# Patient Record
Sex: FEMALE | Race: BLACK OR AFRICAN AMERICAN | Employment: FULL TIME | URBAN - METROPOLITAN AREA
[De-identification: names, ages, dates, MRNs, and addresses within clinical notes are randomized per-mention and may not be internally consistent; named-entity substitution may affect disease eponyms.]

---

## 2017-01-30 ENCOUNTER — OFFICE VISIT (OUTPATIENT)
Dept: INTERNAL MEDICINE CLINIC | Facility: CLINIC | Age: 57
End: 2017-01-30

## 2017-01-30 VITALS
WEIGHT: 183 LBS | HEIGHT: 64 IN | HEART RATE: 88 BPM | DIASTOLIC BLOOD PRESSURE: 70 MMHG | BODY MASS INDEX: 31.24 KG/M2 | TEMPERATURE: 97 F | SYSTOLIC BLOOD PRESSURE: 92 MMHG

## 2017-01-30 DIAGNOSIS — E78.00 PURE HYPERCHOLESTEROLEMIA: ICD-10-CM

## 2017-01-30 DIAGNOSIS — Z12.11 COLON CANCER SCREENING: ICD-10-CM

## 2017-01-30 DIAGNOSIS — Z12.39 BREAST CANCER SCREENING: ICD-10-CM

## 2017-01-30 DIAGNOSIS — I10 ESSENTIAL HYPERTENSION, BENIGN: Primary | ICD-10-CM

## 2017-01-30 DIAGNOSIS — E11.9 DIABETES MELLITUS WITHOUT COMPLICATION (HCC): ICD-10-CM

## 2017-01-30 PROCEDURE — 99214 OFFICE O/P EST MOD 30 MIN: CPT | Performed by: INTERNAL MEDICINE

## 2017-01-30 PROCEDURE — 99212 OFFICE O/P EST SF 10 MIN: CPT | Performed by: INTERNAL MEDICINE

## 2017-01-30 RX ORDER — ERGOCALCIFEROL 1.25 MG/1
50000 CAPSULE ORAL WEEKLY
Qty: 12 CAPSULE | Refills: 3 | Status: SHIPPED | OUTPATIENT
Start: 2017-01-30 | End: 2018-01-31

## 2017-01-30 RX ORDER — LISINOPRIL 40 MG/1
40 TABLET ORAL
Qty: 90 TABLET | Refills: 3 | Status: SHIPPED | OUTPATIENT
Start: 2017-01-30 | End: 2018-01-31

## 2017-01-30 RX ORDER — HYDROCODONE BITARTRATE AND ACETAMINOPHEN 10; 325 MG/1; MG/1
1 TABLET ORAL EVERY 6 HOURS PRN
Qty: 120 TABLET | Refills: 0 | Status: SHIPPED | OUTPATIENT
Start: 2017-01-30 | End: 2017-02-27

## 2017-01-30 RX ORDER — FOLIC ACID 1 MG/1
1 TABLET ORAL DAILY
Qty: 90 TABLET | Refills: 3 | Status: SHIPPED | OUTPATIENT
Start: 2017-01-30 | End: 2018-01-31

## 2017-01-30 RX ORDER — HYDROCHLOROTHIAZIDE 12.5 MG/1
12.5 CAPSULE, GELATIN COATED ORAL
Qty: 90 CAPSULE | Refills: 3 | Status: SHIPPED | OUTPATIENT
Start: 2017-01-30 | End: 2018-01-31

## 2017-01-30 RX ORDER — AMLODIPINE BESYLATE 10 MG/1
10 TABLET ORAL
Qty: 90 TABLET | Refills: 3 | Status: SHIPPED | OUTPATIENT
Start: 2017-01-30 | End: 2018-01-31

## 2017-01-30 RX ORDER — ATORVASTATIN CALCIUM 20 MG/1
20 TABLET, FILM COATED ORAL
Qty: 90 TABLET | Refills: 3 | Status: SHIPPED | OUTPATIENT
Start: 2017-01-30 | End: 2018-01-31

## 2017-01-30 RX ORDER — HYDROXYCHLOROQUINE SULFATE 200 MG/1
200 TABLET, FILM COATED ORAL 2 TIMES DAILY
Qty: 180 TABLET | Refills: 3 | Status: SHIPPED | OUTPATIENT
Start: 2017-01-30 | End: 2018-01-31

## 2017-01-30 NOTE — PROGRESS NOTES
HPI:    Patient ID: Mani Morris is a 55year old female. Hypertension  This is a chronic problem. The current episode started more than 1 year ago. The problem is unchanged. The problem is controlled.  Pertinent negatives include no anxiety, blurred vi dizziness, syncope, facial asymmetry, weakness, light-headedness, numbness and headaches. Current Outpatient Prescriptions:  folic acid 1 MG Oral Tab Take 1 tablet (1 mg total) by mouth daily.  Disp: 90 tablet Rfl: 3   hydrochlorothiazide 12.5 MG (ALPHAGAN) 0.2 % Ophthalmic Solution Apply  to eye. instill 1 drop by ophthalmic route  every 8 hours into affected eye(s) Disp:  Rfl:    omalizumab in Sterile Water for Injection Inject 300 mg into the skin every 28 days.  Disp:  Rfl:    EPIPEN 2-ROSANNE 0.3 M equal, round, and reactive to light. Neck: Normal range of motion. No thyromegaly present. Cardiovascular: Normal rate, regular rhythm, normal heart sounds and intact distal pulses. Exam reveals no gallop. No murmur heard.   Pulmonary/Chest: Effort tablet 3      Sig: Take 1 tablet (10 mg total) by mouth once daily. Hydroxychloroquine Sulfate 200 MG Oral Tab 180 tablet 3      Sig: Take 1 tablet (200 mg total) by mouth 2 (two) times daily.       ergocalciferol 43152 units Oral Cap 12 capsule 3

## 2017-02-24 RX ORDER — HYDROCODONE BITARTRATE AND ACETAMINOPHEN 10; 325 MG/1; MG/1
1 TABLET ORAL EVERY 6 HOURS PRN
Qty: 120 TABLET | Refills: 0 | Status: CANCELLED | OUTPATIENT
Start: 2017-02-24

## 2017-02-27 RX ORDER — HYDROCODONE BITARTRATE AND ACETAMINOPHEN 10; 325 MG/1; MG/1
1 TABLET ORAL EVERY 6 HOURS PRN
Qty: 120 TABLET | Refills: 0 | Status: SHIPPED | OUTPATIENT
Start: 2017-02-27 | End: 2017-03-27

## 2017-03-02 ENCOUNTER — TELEPHONE (OUTPATIENT)
Dept: INTERNAL MEDICINE CLINIC | Facility: CLINIC | Age: 57
End: 2017-03-02

## 2017-03-03 ENCOUNTER — OFFICE VISIT (OUTPATIENT)
Dept: INTERNAL MEDICINE CLINIC | Facility: CLINIC | Age: 57
End: 2017-03-03

## 2017-03-03 VITALS
DIASTOLIC BLOOD PRESSURE: 70 MMHG | HEART RATE: 84 BPM | SYSTOLIC BLOOD PRESSURE: 110 MMHG | HEIGHT: 64 IN | WEIGHT: 182 LBS | BODY MASS INDEX: 31.07 KG/M2 | TEMPERATURE: 99 F

## 2017-03-03 DIAGNOSIS — S46.911A MUSCLE STRAIN OF SCAPULAR REGION, RIGHT, INITIAL ENCOUNTER: ICD-10-CM

## 2017-03-03 DIAGNOSIS — S39.012A LOW BACK STRAIN, INITIAL ENCOUNTER: Primary | ICD-10-CM

## 2017-03-03 PROBLEM — S46.919A MUSCLE STRAIN OF SCAPULAR REGION: Status: ACTIVE | Noted: 2017-03-03

## 2017-03-03 PROCEDURE — 99214 OFFICE O/P EST MOD 30 MIN: CPT | Performed by: INTERNAL MEDICINE

## 2017-03-03 PROCEDURE — 99212 OFFICE O/P EST SF 10 MIN: CPT | Performed by: INTERNAL MEDICINE

## 2017-03-03 RX ORDER — CARISOPRODOL 350 MG/1
350 TABLET ORAL 2 TIMES DAILY PRN
Qty: 60 TABLET | Refills: 1 | Status: SHIPPED
Start: 2017-03-03 | End: 2017-12-11 | Stop reason: ALTCHOICE

## 2017-03-03 RX ORDER — PREDNISONE 1 MG/1
5 TABLET ORAL 3 TIMES DAILY
COMMUNITY
End: 2018-09-10

## 2017-03-03 NOTE — ASSESSMENT & PLAN NOTE
Work restrictions , see forms  [facility]  Physical therapy  Continue prednisone and hydrocodone  Return on March 17, 2017.

## 2017-03-03 NOTE — PROGRESS NOTES
HPI:    Patient ID: Jeff Dent is a 55year old female. Back Pain  This is a new problem. The current episode started 1 to 4 weeks ago. The problem occurs constantly. The problem is unchanged. The pain is present in the lumbar spine.  The quality of t Respiratory: Negative for apnea, cough, chest tightness, shortness of breath and wheezing. Cardiovascular: Negative for chest pain, palpitations and leg swelling.    Gastrointestinal: Negative for nausea, abdominal pain, diarrhea, constipation, blood in Clobetasol Propionate (TEMOVATE) 0.05 % External Cream Apply 1 Units topically 2 (two) times daily.  Disp: 60 g Rfl: 3   APEXICON E 0.05 % Apply Externally Cream  Disp:  Rfl: 0   Doxepin HCl (SINEQUAN) 10 MG Oral Cap  Disp:  Rfl: 1   nystatin-triamcinolone Fish                        Comment:Other reaction(s):  FISH CONTAINING PRODUCTS  Lansoprazole                Comment:Other reaction(s): LANSOPRAZOLE             Other reaction(s): LANSOPRAZOLE  Latex                       Comment:Other reaction(s): LATEX No orders of the defined types were placed in this encounter.        Meds This Visit:  No prescriptions requested or ordered in this encounter       Imaging & Referrals:  None       #[phone]

## 2017-03-17 ENCOUNTER — HOSPITAL ENCOUNTER (EMERGENCY)
Facility: HOSPITAL | Age: 57
Discharge: HOME | End: 2017-03-17
Attending: EMERGENCY MEDICINE
Payer: COMMERCIAL

## 2017-03-17 ENCOUNTER — OFFICE VISIT (OUTPATIENT)
Dept: INTERNAL MEDICINE CLINIC | Facility: CLINIC | Age: 57
End: 2017-03-17

## 2017-03-17 ENCOUNTER — APPOINTMENT (OUTPATIENT)
Dept: GENERAL RADIOLOGY | Facility: HOSPITAL | Age: 57
End: 2017-03-17
Attending: EMERGENCY MEDICINE
Payer: COMMERCIAL

## 2017-03-17 VITALS
WEIGHT: 191 LBS | HEART RATE: 109 BPM | TEMPERATURE: 99 F | SYSTOLIC BLOOD PRESSURE: 104 MMHG | BODY MASS INDEX: 32.61 KG/M2 | HEIGHT: 64 IN | DIASTOLIC BLOOD PRESSURE: 64 MMHG

## 2017-03-17 VITALS
TEMPERATURE: 98 F | DIASTOLIC BLOOD PRESSURE: 79 MMHG | WEIGHT: 194 LBS | SYSTOLIC BLOOD PRESSURE: 120 MMHG | HEART RATE: 75 BPM | BODY MASS INDEX: 33.12 KG/M2 | OXYGEN SATURATION: 99 % | HEIGHT: 64 IN | RESPIRATION RATE: 18 BRPM

## 2017-03-17 DIAGNOSIS — R07.89 ATYPICAL CHEST PAIN: Primary | ICD-10-CM

## 2017-03-17 LAB
ANION GAP SERPL CALC-SCNC: 11 MMOL/L (ref 0–18)
BASOPHILS # BLD: 0 K/UL (ref 0–0.2)
BASOPHILS NFR BLD: 0 %
BUN SERPL-MCNC: 17 MG/DL (ref 8–20)
BUN/CREAT SERPL: 21.8 (ref 10–20)
CALCIUM SERPL-MCNC: 9.6 MG/DL (ref 8.5–10.5)
CHLORIDE SERPL-SCNC: 103 MMOL/L (ref 95–110)
CO2 SERPL-SCNC: 25 MMOL/L (ref 22–32)
CREAT SERPL-MCNC: 0.78 MG/DL (ref 0.5–1.5)
D DIMER PPP FEU-MCNC: 0.27 MCG/ML (ref ?–0.55)
EOSINOPHIL # BLD: 0 K/UL (ref 0–0.7)
EOSINOPHIL NFR BLD: 0 %
ERYTHROCYTE [DISTWIDTH] IN BLOOD BY AUTOMATED COUNT: 13.8 % (ref 11–15)
GLUCOSE SERPL-MCNC: 118 MG/DL (ref 70–99)
HCT VFR BLD AUTO: 38.1 % (ref 35–48)
HGB BLD-MCNC: 12.6 G/DL (ref 12–16)
LYMPHOCYTES # BLD: 3.1 K/UL (ref 1–4)
LYMPHOCYTES NFR BLD: 33 %
MCH RBC QN AUTO: 30.5 PG (ref 27–32)
MCHC RBC AUTO-ENTMCNC: 33.2 G/DL (ref 32–37)
MCV RBC AUTO: 91.9 FL (ref 80–100)
MONOCYTES # BLD: 0.7 K/UL (ref 0–1)
MONOCYTES NFR BLD: 7 %
NEUTROPHILS # BLD AUTO: 5.5 K/UL (ref 1.8–7.7)
NEUTROPHILS NFR BLD: 59 %
OSMOLALITY UR CALC.SUM OF ELEC: 291 MOSM/KG (ref 275–295)
PLATELET # BLD AUTO: 249 K/UL (ref 140–400)
PMV BLD AUTO: 8.1 FL (ref 7.4–10.3)
POTASSIUM SERPL-SCNC: 3.2 MMOL/L (ref 3.3–5.1)
RBC # BLD AUTO: 4.15 M/UL (ref 3.7–5.4)
SODIUM SERPL-SCNC: 139 MMOL/L (ref 136–144)
TROPONIN I SERPL-MCNC: 0.01 NG/ML (ref ?–0.03)
WBC # BLD AUTO: 9.4 K/UL (ref 4–11)

## 2017-03-17 PROCEDURE — 85025 COMPLETE CBC W/AUTO DIFF WBC: CPT | Performed by: EMERGENCY MEDICINE

## 2017-03-17 PROCEDURE — 93010 ELECTROCARDIOGRAM REPORT: CPT | Performed by: EMERGENCY MEDICINE

## 2017-03-17 PROCEDURE — 99284 EMERGENCY DEPT VISIT MOD MDM: CPT

## 2017-03-17 PROCEDURE — 99212 OFFICE O/P EST SF 10 MIN: CPT | Performed by: INTERNAL MEDICINE

## 2017-03-17 PROCEDURE — 96374 THER/PROPH/DIAG INJ IV PUSH: CPT

## 2017-03-17 PROCEDURE — 85379 FIBRIN DEGRADATION QUANT: CPT | Performed by: EMERGENCY MEDICINE

## 2017-03-17 PROCEDURE — 93005 ELECTROCARDIOGRAM TRACING: CPT

## 2017-03-17 PROCEDURE — 96375 TX/PRO/DX INJ NEW DRUG ADDON: CPT

## 2017-03-17 PROCEDURE — 80048 BASIC METABOLIC PNL TOTAL CA: CPT | Performed by: EMERGENCY MEDICINE

## 2017-03-17 PROCEDURE — 84484 ASSAY OF TROPONIN QUANT: CPT | Performed by: EMERGENCY MEDICINE

## 2017-03-17 PROCEDURE — 71010 XR CHEST AP PORTABLE  (CPT=71010): CPT

## 2017-03-17 PROCEDURE — S0028 INJECTION, FAMOTIDINE, 20 MG: HCPCS | Performed by: EMERGENCY MEDICINE

## 2017-03-17 RX ORDER — DIPHENHYDRAMINE HYDROCHLORIDE 50 MG/ML
INJECTION INTRAMUSCULAR; INTRAVENOUS
Status: COMPLETED
Start: 2017-03-17 | End: 2017-03-17

## 2017-03-17 RX ORDER — MAGNESIUM HYDROXIDE/ALUMINUM HYDROXICE/SIMETHICONE 120; 1200; 1200 MG/30ML; MG/30ML; MG/30ML
30 SUSPENSION ORAL ONCE
Status: COMPLETED | OUTPATIENT
Start: 2017-03-17 | End: 2017-03-17

## 2017-03-17 RX ORDER — FAMOTIDINE 10 MG/ML
20 INJECTION, SOLUTION INTRAVENOUS ONCE
Status: COMPLETED | OUTPATIENT
Start: 2017-03-17 | End: 2017-03-17

## 2017-03-17 RX ORDER — DIPHENHYDRAMINE HYDROCHLORIDE 50 MG/ML
25 INJECTION INTRAMUSCULAR; INTRAVENOUS ONCE
Status: COMPLETED | OUTPATIENT
Start: 2017-03-17 | End: 2017-03-17

## 2017-03-17 NOTE — ED NOTES
Pt reports severe itching. NO redness noted. Pt speaking in full sentences. Pt medicated with benadryl IV.  Will continue to monitor

## 2017-03-17 NOTE — ED PROVIDER NOTES
Patient Seen in: [facility] AND [facility] Emergency Department    History   Patient presents with:  Chest Pain Angina (cardiovascular)    Stated Complaint: chest pain     HPI    Patient presents with chest pain for the past 4 days.   It is a sharp pain across th MetFORMIN HCl 500 MG Oral Tab,  Take 1 tablet (500 mg total) by mouth 2 (two) times daily with meals. lisinopril 40 MG Oral Tab,  Take 1 tablet (40 mg total) by mouth once daily.    Atorvastatin Calcium 20 MG Oral Tab,  Take 1 tablet (20 mg total) by mout vomiting  Genitourinary: no dysuria      Positive for stated complaint: chest pain   Other systems are as noted in HPI. Constitutional and vital signs reviewed. All other systems reviewed and negative except as noted above.     [facility] elements reviewed Benadryl which helped her. She is reluctant to take medication she says she is sensitive to them.   Workup here was negative I discussed possible musculoskeletal possible reflux I discussed follow-up with her doctor possible follow-up testing she may need

## 2017-03-17 NOTE — PROGRESS NOTES
HPI:    Patient ID: July Lamas is a 55year old female. Chest Pain   This is a new problem. The current episode started in the past 7 days. The onset quality is sudden. The problem occurs intermittently. The pain is at a severity of 9/10.  The pain is 40 MG Oral Tab Take 1 tablet (40 mg total) by mouth once daily. Disp: 90 tablet Rfl: 3   Atorvastatin Calcium 20 MG Oral Tab Take 1 tablet (20 mg total) by mouth once daily.  Disp: 90 tablet Rfl: 3   AmLODIPine Besylate 10 MG Oral Tab Take 1 tablet (10 mg t reaction(s): BANDAGES, LIGHT-WEIGHT  Aspirin                     Comment:Other reaction(s): ASPIRIN             Other reaction(s): ASPIRIN  Caffeine                    Comment:Other reaction(s): CAFFEINE  Clindamycin                 Comment:Other reaction(

## 2017-03-17 NOTE — ASSESSMENT & PLAN NOTE
Atypical chest pain, sharp, intermittent for 4 days, has not sought medical attention, patient refused to allow us to call 911, she insisted on driving herself to the ER and left. Pain was 9/10 , she has an extremely low threshold for pain .    HR on puls

## 2017-03-21 ENCOUNTER — OFFICE VISIT (OUTPATIENT)
Dept: INTERNAL MEDICINE CLINIC | Facility: CLINIC | Age: 57
End: 2017-03-21

## 2017-03-21 VITALS
HEART RATE: 84 BPM | DIASTOLIC BLOOD PRESSURE: 70 MMHG | HEIGHT: 64 IN | TEMPERATURE: 98 F | WEIGHT: 186 LBS | BODY MASS INDEX: 31.76 KG/M2 | SYSTOLIC BLOOD PRESSURE: 110 MMHG

## 2017-03-21 DIAGNOSIS — S39.012A LOW BACK STRAIN, INITIAL ENCOUNTER: Primary | ICD-10-CM

## 2017-03-21 DIAGNOSIS — S46.911A MUSCLE STRAIN OF SCAPULAR REGION, RIGHT, INITIAL ENCOUNTER: ICD-10-CM

## 2017-03-21 PROCEDURE — 99212 OFFICE O/P EST SF 10 MIN: CPT | Performed by: INTERNAL MEDICINE

## 2017-03-21 PROCEDURE — 99214 OFFICE O/P EST MOD 30 MIN: CPT | Performed by: INTERNAL MEDICINE

## 2017-03-21 NOTE — PROGRESS NOTES
HPI:    Patient ID: Kanika Adam is a 55year old female. Back Pain  This is a new problem. The current episode started 1 to 4 weeks ago. The problem has been resolved since onset. The pain is present in the lumbar spine.  The pain is at a severity of 0  MG Oral Tab Take 1 tablet by mouth every 6 (six) hours as needed for Pain. Disp: 120 tablet Rfl: 0   folic acid 1 MG Oral Tab Take 1 tablet (1 mg total) by mouth daily.  Disp: 90 tablet Rfl: 3   hydrochlorothiazide 12.5 MG Oral Cap Take 1 capsule (12 for Injection Inject 300 mg into the skin every 28 days.  Disp:  Rfl:    EPIPEN 2-ROSANNE 0.3 MG/0.3ML Injection Solution Auto-injector  Disp:  Rfl: 2   Glucose Blood (RA TRUETEST TEST) In Vitro Strip take by Intradermal route check  glucose 3 times a day Disp: intact distal pulses. Exam reveals no gallop. No murmur heard. Pulmonary/Chest: Effort normal and breath sounds normal. No respiratory distress. She has no wheezes. She has no rales. She exhibits no tenderness.    Abdominal: She exhibits no distension

## 2017-03-27 ENCOUNTER — TELEPHONE (OUTPATIENT)
Dept: INTERNAL MEDICINE CLINIC | Facility: CLINIC | Age: 57
End: 2017-03-27

## 2017-03-27 RX ORDER — HYDROCODONE BITARTRATE AND ACETAMINOPHEN 10; 325 MG/1; MG/1
1 TABLET ORAL EVERY 6 HOURS PRN
Qty: 120 TABLET | Refills: 0 | Status: SHIPPED | OUTPATIENT
Start: 2017-03-31 | End: 2017-04-17

## 2017-04-17 RX ORDER — HYDROCODONE BITARTRATE AND ACETAMINOPHEN 10; 325 MG/1; MG/1
1 TABLET ORAL EVERY 6 HOURS PRN
Qty: 120 TABLET | Refills: 0 | Status: SHIPPED | OUTPATIENT
Start: 2017-05-01 | End: 2017-05-30

## 2017-05-30 RX ORDER — HYDROCODONE BITARTRATE AND ACETAMINOPHEN 10; 325 MG/1; MG/1
1 TABLET ORAL EVERY 6 HOURS PRN
Qty: 120 TABLET | Refills: 0 | Status: SHIPPED | OUTPATIENT
Start: 2017-05-30 | End: 2017-06-19

## 2017-06-19 ENCOUNTER — OFFICE VISIT (OUTPATIENT)
Dept: INTERNAL MEDICINE CLINIC | Facility: CLINIC | Age: 57
End: 2017-06-19

## 2017-06-19 VITALS
HEART RATE: 108 BPM | BODY MASS INDEX: 31.92 KG/M2 | SYSTOLIC BLOOD PRESSURE: 92 MMHG | HEIGHT: 64 IN | WEIGHT: 187 LBS | TEMPERATURE: 98 F | DIASTOLIC BLOOD PRESSURE: 66 MMHG

## 2017-06-19 DIAGNOSIS — E11.9 DIABETES MELLITUS WITHOUT COMPLICATION (HCC): ICD-10-CM

## 2017-06-19 DIAGNOSIS — I10 ESSENTIAL HYPERTENSION, BENIGN: Primary | ICD-10-CM

## 2017-06-19 DIAGNOSIS — E78.00 PURE HYPERCHOLESTEROLEMIA: ICD-10-CM

## 2017-06-19 PROCEDURE — 36415 COLL VENOUS BLD VENIPUNCTURE: CPT | Performed by: INTERNAL MEDICINE

## 2017-06-19 PROCEDURE — 99214 OFFICE O/P EST MOD 30 MIN: CPT | Performed by: INTERNAL MEDICINE

## 2017-06-19 RX ORDER — HYDROCODONE BITARTRATE AND ACETAMINOPHEN 10; 325 MG/1; MG/1
1 TABLET ORAL EVERY 6 HOURS PRN
Qty: 120 TABLET | Refills: 0 | Status: SHIPPED | OUTPATIENT
Start: 2017-06-30 | End: 2017-07-24

## 2017-06-19 NOTE — PROGRESS NOTES
HPI:    Patient ID: Sarbjit Reyes is a 55year old female. Hypertension  This is a chronic problem. The current episode started more than 1 year ago. The problem is unchanged. The problem is controlled.  Pertinent negatives include no anxiety, blurred vi Neurological: Negative for dizziness, syncope, facial asymmetry, weakness, light-headedness, numbness and headaches.             Current Outpatient Prescriptions:  [START ON 6/30/2017] HYDROcodone-acetaminophen  MG Oral Tab Take 1 tablet by mouth ev nystatin-triamcinolone (MYCOLOG) 410589-0.1 UNIT/GM-% Apply Externally Ointment  Disp:  Rfl: 0   HydrOXYzine HCl (ATARAX) 10 MG Oral Tab TAKE 1 TABLET BY MOUTH THREE TIMES DAILY AS NEEDED FOR ITCHING Disp: 30 tablet Rfl: 11   Hydrocortisone Valerate (MARY KAY Comment:Other reaction(s): PENICILLINS             Other reaction(s): PENICILLINS  Tramadol Hcl                Comment:Other reaction(s): TRAMADOL HCL   PHYSICAL EXAM:   Physical Exam   Constitutional: She is oriented to person, place, and cherie

## 2017-07-25 RX ORDER — HYDROCODONE BITARTRATE AND ACETAMINOPHEN 10; 325 MG/1; MG/1
1 TABLET ORAL EVERY 6 HOURS PRN
Qty: 120 TABLET | Refills: 0 | Status: SHIPPED | OUTPATIENT
Start: 2017-07-25 | End: 2017-08-29

## 2017-08-29 RX ORDER — HYDROCODONE BITARTRATE AND ACETAMINOPHEN 10; 325 MG/1; MG/1
1 TABLET ORAL EVERY 6 HOURS PRN
Qty: 120 TABLET | Refills: 0 | Status: SHIPPED | OUTPATIENT
Start: 2017-08-29 | End: 2017-09-25

## 2017-08-29 NOTE — TELEPHONE ENCOUNTER
Last refilled 120 tablets 7/25/17. Last office visit 6/19/17. Patient would like to  prescription today, 8/29/17. Please advise.

## 2017-09-25 ENCOUNTER — OFFICE VISIT (OUTPATIENT)
Dept: INTERNAL MEDICINE CLINIC | Facility: CLINIC | Age: 57
End: 2017-09-25

## 2017-09-25 VITALS
SYSTOLIC BLOOD PRESSURE: 110 MMHG | HEART RATE: 88 BPM | DIASTOLIC BLOOD PRESSURE: 74 MMHG | TEMPERATURE: 98 F | WEIGHT: 190 LBS | HEIGHT: 64 IN | BODY MASS INDEX: 32.44 KG/M2

## 2017-09-25 DIAGNOSIS — M06.9 RHEUMATOID ARTHRITIS(714.0): ICD-10-CM

## 2017-09-25 DIAGNOSIS — E78.00 PURE HYPERCHOLESTEROLEMIA: ICD-10-CM

## 2017-09-25 DIAGNOSIS — E11.9 DIABETES MELLITUS WITHOUT COMPLICATION (HCC): ICD-10-CM

## 2017-09-25 DIAGNOSIS — R07.89 ATYPICAL CHEST PAIN: ICD-10-CM

## 2017-09-25 DIAGNOSIS — I10 ESSENTIAL HYPERTENSION, BENIGN: Primary | ICD-10-CM

## 2017-09-25 PROCEDURE — 99214 OFFICE O/P EST MOD 30 MIN: CPT | Performed by: INTERNAL MEDICINE

## 2017-09-25 PROCEDURE — 99212 OFFICE O/P EST SF 10 MIN: CPT | Performed by: INTERNAL MEDICINE

## 2017-09-25 RX ORDER — INFLUENZA A VIRUS A/CHRISTCHURCH/16/2010 NIB-74 (H1N1) HEMAGGLUTININ ANTIGEN (PROPIOLACTONE INACTIVATED), INFLUENZA A VIRUS A/SWITZERLAND/9715293/2013, NIB-88 (H3N2) HEMAGGLUTININ ANTIGEN (PROPIOLACTONE INACTIVATED), INFLUENZA B VIRUS B/PHUKET/3073/2013 - WILD TYPE HEMAGGLUTININ ANTIGEN (PROPIOLACTONE INACTIVATED) 15; 15; 15 UG/.5ML; UG/.5ML; UG/.5ML
INJECTION, SUSPENSION INTRAMUSCULAR
Refills: 0 | COMMUNITY
Start: 2017-08-24 | End: 2017-09-25 | Stop reason: ALTCHOICE

## 2017-09-25 RX ORDER — INFLIXIMAB 100 MG/10ML
INJECTION, POWDER, LYOPHILIZED, FOR SOLUTION INTRAVENOUS
COMMUNITY
End: 2019-03-11

## 2017-09-25 RX ORDER — RANITIDINE 300 MG/1
300 TABLET ORAL NIGHTLY
Qty: 30 TABLET | Refills: 3 | Status: SHIPPED | OUTPATIENT
Start: 2017-09-25 | End: 2017-11-20

## 2017-09-25 RX ORDER — HYDROCODONE BITARTRATE AND ACETAMINOPHEN 10; 325 MG/1; MG/1
1 TABLET ORAL EVERY 6 HOURS PRN
Qty: 120 TABLET | Refills: 0 | Status: SHIPPED | OUTPATIENT
Start: 2017-09-28 | End: 2017-10-26

## 2017-09-25 NOTE — PROGRESS NOTES
HPI:    Patient ID: Alejandro Harrington is a 65year old female. Hypertension   This is a chronic problem. The current episode started more than 1 year ago. The problem is unchanged. Associated symptoms include chest pain.  Pertinent negatives include no anxie syncope, facial asymmetry, weakness, light-headedness, numbness and headaches. Current Outpatient Prescriptions:  inFLIXimab (REMICADE) 100 MG Intravenous Recon Soln Inject into the vein every 8 weeks.  Disp:  Rfl:    RaNITidine HCl (ZANTAC) 300 EPIPEN 2-ROSANNE 0.3 MG/0.3ML Injection Solution Auto-injector  Disp:  Rfl: 2   nystatin-triamcinolone (MYCOLOG) 882004-0.1 UNIT/GM-% Apply Externally Ointment  Disp:  Rfl: 0   DiphenhydrAMINE HCl (BENADRYL ALLERGY OR) Take  by mouth.  Disp:  Rfl:    HydrOXYz Comment:Other reaction(s): PENICILLINS             Other reaction(s): PENICILLINS  Tramadol Hcl                Comment:Other reaction(s): TRAMADOL HCL   PHYSICAL EXAM:   Physical Exam   Constitutional: She is oriented to person, place, and time.  She appear Imaging & Referrals:  CARD NUC STRESS TEST Bharathi Najera       RF#[id number]

## 2017-10-13 ENCOUNTER — HOSPITAL ENCOUNTER (OUTPATIENT)
Dept: NUCLEAR MEDICINE | Facility: HOSPITAL | Age: 57
Discharge: HOME | End: 2017-10-13
Attending: INTERNAL MEDICINE
Payer: COMMERCIAL

## 2017-10-13 DIAGNOSIS — E11.9 DIABETES (HCC): ICD-10-CM

## 2017-10-13 DIAGNOSIS — R07.89 ATYPICAL CHEST PAIN: ICD-10-CM

## 2017-10-13 DIAGNOSIS — I10 HTN (HYPERTENSION): ICD-10-CM

## 2017-10-13 DIAGNOSIS — R07.9 CHEST PAIN: ICD-10-CM

## 2017-10-13 PROCEDURE — 78451 HT MUSCLE IMAGE SPECT SING: CPT | Performed by: INTERNAL MEDICINE

## 2017-10-26 ENCOUNTER — TELEPHONE (OUTPATIENT)
Dept: INTERNAL MEDICINE CLINIC | Facility: CLINIC | Age: 57
End: 2017-10-26

## 2017-10-26 RX ORDER — HYDROCODONE BITARTRATE AND ACETAMINOPHEN 10; 325 MG/1; MG/1
1 TABLET ORAL EVERY 6 HOURS PRN
Qty: 120 TABLET | Refills: 0 | Status: SHIPPED | OUTPATIENT
Start: 2017-10-26 | End: 2017-11-27

## 2017-10-26 NOTE — TELEPHONE ENCOUNTER
Patient was unable to complete Anayeli Edward since she is allergic to the glue on the leads. She just wanted to inform you.

## 2017-11-21 RX ORDER — RANITIDINE 300 MG/1
300 TABLET ORAL NIGHTLY
Qty: 90 TABLET | Refills: 1 | Status: SHIPPED | OUTPATIENT
Start: 2017-11-21 | End: 2020-03-17

## 2017-11-27 RX ORDER — HYDROCODONE BITARTRATE AND ACETAMINOPHEN 10; 325 MG/1; MG/1
1 TABLET ORAL EVERY 6 HOURS PRN
Qty: 120 TABLET | Refills: 0 | Status: SHIPPED | OUTPATIENT
Start: 2017-11-27 | End: 2017-12-11

## 2017-12-11 ENCOUNTER — OFFICE VISIT (OUTPATIENT)
Dept: INTERNAL MEDICINE CLINIC | Facility: CLINIC | Age: 57
End: 2017-12-11

## 2017-12-11 VITALS
SYSTOLIC BLOOD PRESSURE: 92 MMHG | HEART RATE: 88 BPM | TEMPERATURE: 97 F | DIASTOLIC BLOOD PRESSURE: 70 MMHG | WEIGHT: 184 LBS | HEIGHT: 64 IN | BODY MASS INDEX: 31.41 KG/M2

## 2017-12-11 DIAGNOSIS — I10 ESSENTIAL HYPERTENSION, BENIGN: Primary | ICD-10-CM

## 2017-12-11 DIAGNOSIS — Z12.39 BREAST CANCER SCREENING: ICD-10-CM

## 2017-12-11 DIAGNOSIS — E78.00 PURE HYPERCHOLESTEROLEMIA: ICD-10-CM

## 2017-12-11 DIAGNOSIS — E11.9 DIABETES MELLITUS WITHOUT COMPLICATION (HCC): ICD-10-CM

## 2017-12-11 PROCEDURE — 99212 OFFICE O/P EST SF 10 MIN: CPT | Performed by: INTERNAL MEDICINE

## 2017-12-11 PROCEDURE — 99214 OFFICE O/P EST MOD 30 MIN: CPT | Performed by: INTERNAL MEDICINE

## 2017-12-11 RX ORDER — HYDROCODONE BITARTRATE AND ACETAMINOPHEN 10; 325 MG/1; MG/1
1 TABLET ORAL EVERY 6 HOURS PRN
Qty: 120 TABLET | Refills: 0 | Status: SHIPPED | OUTPATIENT
Start: 2017-12-27 | End: 2017-12-18

## 2017-12-11 RX ORDER — TRAZODONE HYDROCHLORIDE 150 MG/1
150 TABLET ORAL NIGHTLY
Refills: 0 | COMMUNITY
Start: 2017-11-09 | End: 2018-09-10

## 2017-12-11 RX ORDER — CYCLOBENZAPRINE HCL 5 MG
TABLET ORAL
Refills: 0 | COMMUNITY
Start: 2017-11-09 | End: 2018-09-10

## 2017-12-11 NOTE — PROGRESS NOTES
HPI:    Patient ID: July Lamas is a 65year old female. Hypertension   This is a chronic problem. The current episode started more than 1 year ago. The problem is unchanged.  Pertinent negatives include no anxiety, blurred vision, chest pain, headache Prescriptions:  omalizumab (XOLAIR) 150 MG Subcutaneous Recon Soln RECONSTITUTE EACH OF 2 VIALS WITH 1.4ML STERILE WATER.  INJECT 1.2ML SEPARATELY SUBCUTANEOUSLY FROM EACH VIAL ONCE EVERY FOUR WEEKS (TOTAL=30 Disp:  Rfl:    Cyclobenzaprine HCl 5 MG Oral Tab daily. Disp: 60 g Rfl: 3   APEXICON E 0.05 % Apply Externally Cream  Disp:  Rfl: 0   Doxepin HCl (SINEQUAN) 10 MG Oral Cap  Disp:  Rfl: 1   EPIPEN 2-ROSANNE 0.3 MG/0.3ML Injection Solution Auto-injector  Disp:  Rfl: 2   nystatin-triamcinolone (MYCOLOG) 794407- Other reaction(s): LEVOFLOXACIN  Morphine                    Comment:Other reaction(s): MORPHINE  Penicillins                 Comment:Other reaction(s): PENICILLINS             Other reaction(s): PENICILLINS  Tramadol Hcl                Comment:Other reac

## 2017-12-18 ENCOUNTER — TELEPHONE (OUTPATIENT)
Dept: INTERNAL MEDICINE CLINIC | Facility: CLINIC | Age: 57
End: 2017-12-18

## 2017-12-18 RX ORDER — HYDROCODONE BITARTRATE AND ACETAMINOPHEN 10; 325 MG/1; MG/1
1 TABLET ORAL EVERY 6 HOURS PRN
Qty: 100 TABLET | Refills: 0 | Status: SHIPPED | OUTPATIENT
Start: 2017-12-27 | End: 2018-01-25

## 2018-01-25 RX ORDER — HYDROCODONE BITARTRATE AND ACETAMINOPHEN 10; 325 MG/1; MG/1
1 TABLET ORAL EVERY 6 HOURS PRN
Qty: 100 TABLET | Refills: 0 | Status: SHIPPED | OUTPATIENT
Start: 2018-01-25 | End: 2018-02-15

## 2018-02-05 RX ORDER — ATORVASTATIN CALCIUM 20 MG/1
TABLET, FILM COATED ORAL
Qty: 90 TABLET | Refills: 0 | Status: SHIPPED | OUTPATIENT
Start: 2018-02-05 | End: 2018-03-26

## 2018-02-05 RX ORDER — LISINOPRIL 40 MG/1
TABLET ORAL
Qty: 90 TABLET | Refills: 3 | Status: SHIPPED | OUTPATIENT
Start: 2018-02-05 | End: 2018-03-26

## 2018-02-05 RX ORDER — ERGOCALCIFEROL 1.25 MG/1
CAPSULE ORAL
Qty: 12 CAPSULE | Refills: 3 | Status: SHIPPED | OUTPATIENT
Start: 2018-02-05 | End: 2018-03-26

## 2018-02-05 RX ORDER — HYDROCHLOROTHIAZIDE 12.5 MG/1
CAPSULE, GELATIN COATED ORAL
Qty: 90 CAPSULE | Refills: 3 | Status: SHIPPED | OUTPATIENT
Start: 2018-02-05 | End: 2018-03-26

## 2018-02-05 RX ORDER — HYDROXYCHLOROQUINE SULFATE 200 MG/1
TABLET, FILM COATED ORAL
Qty: 180 TABLET | Refills: 0 | Status: SHIPPED | OUTPATIENT
Start: 2018-02-05 | End: 2018-03-26

## 2018-02-05 RX ORDER — FOLIC ACID 1 MG/1
TABLET ORAL
Qty: 90 TABLET | Refills: 0 | Status: SHIPPED | OUTPATIENT
Start: 2018-02-05 | End: 2018-03-26

## 2018-02-05 RX ORDER — AMLODIPINE BESYLATE 10 MG/1
TABLET ORAL
Qty: 90 TABLET | Refills: 3 | Status: SHIPPED | OUTPATIENT
Start: 2018-02-05 | End: 2018-03-26

## 2018-02-15 ENCOUNTER — TELEPHONE (OUTPATIENT)
Dept: FAMILY MEDICINE CLINIC | Facility: CLINIC | Age: 58
End: 2018-02-15

## 2018-02-15 RX ORDER — HYDROCODONE BITARTRATE AND ACETAMINOPHEN 10; 325 MG/1; MG/1
1 TABLET ORAL EVERY 4 HOURS PRN
Qty: 120 TABLET | Refills: 0 | Status: SHIPPED | OUTPATIENT
Start: 2018-02-19 | End: 2018-03-19

## 2018-02-15 NOTE — TELEPHONE ENCOUNTER
Would like to talk to you about her Norco, I asked her details, she just wanted to talk to you.       Cell---[phone]

## 2018-03-08 ENCOUNTER — TELEPHONE (OUTPATIENT)
Dept: FAMILY MEDICINE CLINIC | Facility: CLINIC | Age: 58
End: 2018-03-08

## 2018-03-08 NOTE — TELEPHONE ENCOUNTER
Attempted to call patient. [facility] answer[id number] Per  mammogram results benign. Please look in media.

## 2018-03-19 RX ORDER — HYDROCODONE BITARTRATE AND ACETAMINOPHEN 10; 325 MG/1; MG/1
1 TABLET ORAL EVERY 4 HOURS PRN
Qty: 120 TABLET | Refills: 0 | Status: SHIPPED | OUTPATIENT
Start: 2018-03-19 | End: 2018-04-12

## 2018-03-20 ENCOUNTER — TELEPHONE (OUTPATIENT)
Dept: INTERNAL MEDICINE CLINIC | Facility: CLINIC | Age: 58
End: 2018-03-20

## 2018-03-26 RX ORDER — LISINOPRIL 40 MG/1
40 TABLET ORAL
Qty: 90 TABLET | Refills: 3 | Status: SHIPPED | OUTPATIENT
Start: 2018-03-26 | End: 2019-03-25

## 2018-03-26 RX ORDER — ERGOCALCIFEROL 1.25 MG/1
CAPSULE ORAL
Qty: 12 CAPSULE | Refills: 3 | Status: SHIPPED | OUTPATIENT
Start: 2018-03-26 | End: 2019-03-25

## 2018-03-26 RX ORDER — HYDROXYCHLOROQUINE SULFATE 200 MG/1
200 TABLET, FILM COATED ORAL 2 TIMES DAILY
Qty: 180 TABLET | Refills: 3 | Status: SHIPPED | OUTPATIENT
Start: 2018-03-26 | End: 2019-03-28

## 2018-03-26 RX ORDER — AMLODIPINE BESYLATE 10 MG/1
10 TABLET ORAL
Qty: 90 TABLET | Refills: 3 | Status: SHIPPED | OUTPATIENT
Start: 2018-03-26 | End: 2018-09-10

## 2018-03-26 RX ORDER — FOLIC ACID 1 MG/1
1 TABLET ORAL
Qty: 90 TABLET | Refills: 3 | Status: SHIPPED | OUTPATIENT
Start: 2018-03-26 | End: 2019-03-25

## 2018-03-26 RX ORDER — ATORVASTATIN CALCIUM 20 MG/1
20 TABLET, FILM COATED ORAL
Qty: 90 TABLET | Refills: 3 | Status: SHIPPED | OUTPATIENT
Start: 2018-03-26 | End: 2019-03-25

## 2018-03-26 RX ORDER — HYDROCHLOROTHIAZIDE 12.5 MG/1
12.5 CAPSULE, GELATIN COATED ORAL
Qty: 90 CAPSULE | Refills: 3 | Status: SHIPPED | OUTPATIENT
Start: 2018-03-26 | End: 2019-03-25

## 2018-03-26 RX ORDER — HYDROXYZINE HYDROCHLORIDE 10 MG/1
TABLET, FILM COATED ORAL
Qty: 90 TABLET | Refills: 3 | Status: SHIPPED | OUTPATIENT
Start: 2018-03-26 | End: 2018-09-10

## 2018-04-02 ENCOUNTER — TELEPHONE (OUTPATIENT)
Dept: FAMILY MEDICINE CLINIC | Facility: CLINIC | Age: 58
End: 2018-04-02

## 2018-04-02 NOTE — TELEPHONE ENCOUNTER
She said her METHOTREXATE was not sent to [facility].

## 2018-04-12 RX ORDER — HYDROCODONE BITARTRATE AND ACETAMINOPHEN 10; 325 MG/1; MG/1
1 TABLET ORAL EVERY 4 HOURS PRN
Qty: 120 TABLET | Refills: 0 | Status: SHIPPED | OUTPATIENT
Start: 2018-04-18 | End: 2018-05-03

## 2018-05-03 ENCOUNTER — TELEPHONE (OUTPATIENT)
Dept: FAMILY MEDICINE CLINIC | Facility: CLINIC | Age: 58
End: 2018-05-03

## 2018-05-03 RX ORDER — HYDROCODONE BITARTRATE AND ACETAMINOPHEN 10; 325 MG/1; MG/1
1 TABLET ORAL
Qty: 150 TABLET | Refills: 0 | Status: SHIPPED | OUTPATIENT
Start: 2018-05-07 | End: 2018-06-04

## 2018-05-03 NOTE — TELEPHONE ENCOUNTER
Patient came in for [facility], Dr. Page Wong did fill it with a change in directions and quantity.

## 2018-05-03 NOTE — TELEPHONE ENCOUNTER
The last refill was dated for 4/18/18. That would make it too soon. Please find out why she needs it dated for 5/7/18.   Thanks

## 2018-06-04 RX ORDER — HYDROCODONE BITARTRATE AND ACETAMINOPHEN 10; 325 MG/1; MG/1
1 TABLET ORAL
Qty: 150 TABLET | Refills: 0 | Status: SHIPPED | OUTPATIENT
Start: 2018-06-05 | End: 2018-06-11

## 2018-06-11 ENCOUNTER — OFFICE VISIT (OUTPATIENT)
Dept: INTERNAL MEDICINE CLINIC | Facility: CLINIC | Age: 58
End: 2018-06-11

## 2018-06-11 VITALS
TEMPERATURE: 97 F | SYSTOLIC BLOOD PRESSURE: 110 MMHG | HEIGHT: 64 IN | BODY MASS INDEX: 30.02 KG/M2 | HEART RATE: 84 BPM | WEIGHT: 175.81 LBS | DIASTOLIC BLOOD PRESSURE: 66 MMHG

## 2018-06-11 DIAGNOSIS — E11.9 DIABETES MELLITUS WITHOUT COMPLICATION (HCC): ICD-10-CM

## 2018-06-11 DIAGNOSIS — E78.00 PURE HYPERCHOLESTEROLEMIA: ICD-10-CM

## 2018-06-11 DIAGNOSIS — M05.771 RHEUMATOID ARTHRITIS INVOLVING BOTH FEET WITH POSITIVE RHEUMATOID FACTOR (HCC): ICD-10-CM

## 2018-06-11 DIAGNOSIS — I10 ESSENTIAL HYPERTENSION, BENIGN: Primary | ICD-10-CM

## 2018-06-11 DIAGNOSIS — M05.772 RHEUMATOID ARTHRITIS INVOLVING BOTH FEET WITH POSITIVE RHEUMATOID FACTOR (HCC): ICD-10-CM

## 2018-06-11 PROCEDURE — 99214 OFFICE O/P EST MOD 30 MIN: CPT | Performed by: INTERNAL MEDICINE

## 2018-06-11 PROCEDURE — 99212 OFFICE O/P EST SF 10 MIN: CPT | Performed by: INTERNAL MEDICINE

## 2018-06-11 RX ORDER — HYDROCODONE BITARTRATE AND ACETAMINOPHEN 10; 325 MG/1; MG/1
1 TABLET ORAL
Qty: 150 TABLET | Refills: 0 | Status: SHIPPED | OUTPATIENT
Start: 2018-07-03 | End: 2018-08-02

## 2018-06-11 RX ORDER — ESZOPICLONE 2 MG/1
2 TABLET, FILM COATED ORAL
Refills: 0 | COMMUNITY
Start: 2018-04-27 | End: 2018-09-10

## 2018-06-11 NOTE — ASSESSMENT & PLAN NOTE
See Dr Bebeto Tolentino, patient complains of pain , and worse with current job where she is on her feet 7 hours a day . Wants more pain meds. Told patient she will need to see pain management, or discuss with her RA doctor. Also see podiatry .

## 2018-06-11 NOTE — PROGRESS NOTES
HPI:    Patient ID: Randeen Moritz is a 65year old female. Hypertension   This is a chronic problem. The current episode started more than 1 year ago. The problem is unchanged. The problem is controlled.  Pertinent negatives include no anxiety, blurred v tablet (20 mg total) by mouth once daily. Disp: 90 tablet Rfl: 3   ergocalciferol 42889 units Oral Cap TAKE 1 CAPSULE BY MOUTH EVERY WEEK Disp: 12 capsule Rfl: 3   folic acid 1 MG Oral Tab Take 1 tablet (1 mg total) by mouth once daily.  Disp: 90 tablet Rfl Externally Cream  Disp:  Rfl: 0   PATANOL 0.1 % Ophthalmic Solution  Disp:  Rfl: 2   brimonidine Tartrate (ALPHAGAN) 0.2 % Ophthalmic Solution Apply  to eye. instill 1 drop by ophthalmic route  every 8 hours into affected eye(s) Disp:  Rfl:    Glucose Bloo No thyromegaly present. Cardiovascular: Normal rate, regular rhythm, normal heart sounds and intact distal pulses. Exam reveals no gallop. No murmur heard. Pulmonary/Chest: Effort normal and breath sounds normal. No respiratory distress.  She has no

## 2018-08-02 ENCOUNTER — TELEPHONE (OUTPATIENT)
Dept: FAMILY MEDICINE CLINIC | Facility: CLINIC | Age: 58
End: 2018-08-02

## 2018-08-02 RX ORDER — HYDROCODONE BITARTRATE AND ACETAMINOPHEN 10; 325 MG/1; MG/1
1 TABLET ORAL
Qty: 150 TABLET | Refills: 0 | Status: SHIPPED | OUTPATIENT
Start: 2018-08-02 | End: 2018-08-31

## 2018-08-31 ENCOUNTER — TELEPHONE (OUTPATIENT)
Dept: FAMILY MEDICINE CLINIC | Facility: CLINIC | Age: 58
End: 2018-08-31

## 2018-08-31 RX ORDER — HYDROCODONE BITARTRATE AND ACETAMINOPHEN 10; 325 MG/1; MG/1
1 TABLET ORAL
Qty: 150 TABLET | Refills: 0 | Status: SHIPPED | OUTPATIENT
Start: 2018-09-01 | End: 2018-09-25

## 2018-09-10 ENCOUNTER — OFFICE VISIT (OUTPATIENT)
Dept: INTERNAL MEDICINE CLINIC | Facility: CLINIC | Age: 58
End: 2018-09-10
Payer: COMMERCIAL

## 2018-09-10 VITALS
DIASTOLIC BLOOD PRESSURE: 64 MMHG | TEMPERATURE: 98 F | BODY MASS INDEX: 27.66 KG/M2 | SYSTOLIC BLOOD PRESSURE: 110 MMHG | WEIGHT: 162 LBS | HEIGHT: 64 IN | HEART RATE: 96 BPM

## 2018-09-10 DIAGNOSIS — B35.1 ONYCHOMYCOSIS: ICD-10-CM

## 2018-09-10 DIAGNOSIS — M05.772 RHEUMATOID ARTHRITIS INVOLVING BOTH FEET WITH POSITIVE RHEUMATOID FACTOR (HCC): ICD-10-CM

## 2018-09-10 DIAGNOSIS — M05.771 RHEUMATOID ARTHRITIS INVOLVING BOTH FEET WITH POSITIVE RHEUMATOID FACTOR (HCC): ICD-10-CM

## 2018-09-10 DIAGNOSIS — I10 ESSENTIAL HYPERTENSION, BENIGN: Primary | ICD-10-CM

## 2018-09-10 PROCEDURE — 99212 OFFICE O/P EST SF 10 MIN: CPT | Performed by: INTERNAL MEDICINE

## 2018-09-10 PROCEDURE — 99214 OFFICE O/P EST MOD 30 MIN: CPT | Performed by: INTERNAL MEDICINE

## 2018-09-10 RX ORDER — TERBINAFINE HYDROCHLORIDE 250 MG/1
250 TABLET ORAL DAILY
Qty: 30 TABLET | Refills: 2 | Status: SHIPPED | OUTPATIENT
Start: 2018-09-10 | End: 2019-01-27

## 2018-09-10 RX ORDER — DIPHENHYDRAMINE HCL 25 MG
25 CAPSULE ORAL
COMMUNITY

## 2018-09-10 RX ORDER — AMLODIPINE BESYLATE 5 MG/1
5 TABLET ORAL DAILY
Qty: 90 TABLET | Refills: 3 | Status: SHIPPED | OUTPATIENT
Start: 2018-09-10 | End: 2019-03-25

## 2018-09-10 NOTE — PROGRESS NOTES
HPI:    Patient ID: Zora Newman is a 63year old female. HPIpatient is having left toe nail issues. It got yellow and thick and then fell off, no pain . It isn't growing back either.     She has left plantar foot pain , had an US at [facility], negative DAILY WITH MEALS Disp: 180 tablet Rfl: 2   methotrexate 2.5 MG Oral Tab TAKE 6 TABLETS BY MOUTH EVERY WEEK Disp: 72 tablet Rfl: 2   atorvastatin 20 MG Oral Tab Take 1 tablet (20 mg total) by mouth once daily.  Disp: 90 tablet Rfl: 3   ergocalciferol 74074 u times a day Disp:  Rfl:      Allergies:  Acetaminophen               Comment:Other reaction(s): ACETAMINOPHEN  Adhesive Tape (Leonela*        Comment:Other reaction(s): TAPE, OCCLUSIVE ADHESIVE             Other reaction(s): BANDAGES, LIGHT-WEIGHT  Aspirin no distension and no mass. There is no tenderness. There is no rebound and no guarding. Musculoskeletal: She exhibits no edema or tenderness. Neurological: She is alert and oriented to person, place, and time. She exhibits normal muscle tone.  Coordinat

## 2018-09-25 RX ORDER — HYDROCODONE BITARTRATE AND ACETAMINOPHEN 10; 325 MG/1; MG/1
1 TABLET ORAL
Qty: 150 TABLET | Refills: 0 | Status: SHIPPED | OUTPATIENT
Start: 2018-09-30 | End: 2018-10-29

## 2018-10-29 RX ORDER — HYDROCODONE BITARTRATE AND ACETAMINOPHEN 10; 325 MG/1; MG/1
1 TABLET ORAL
Qty: 150 TABLET | Refills: 0 | Status: SHIPPED | OUTPATIENT
Start: 2018-10-29 | End: 2018-11-29

## 2018-11-29 ENCOUNTER — TELEPHONE (OUTPATIENT)
Dept: FAMILY MEDICINE CLINIC | Facility: CLINIC | Age: 58
End: 2018-11-29

## 2018-11-29 RX ORDER — HYDROCODONE BITARTRATE AND ACETAMINOPHEN 10; 325 MG/1; MG/1
1 TABLET ORAL
Qty: 150 TABLET | Refills: 0 | Status: SHIPPED | OUTPATIENT
Start: 2018-11-29 | End: 2018-12-10

## 2018-12-10 ENCOUNTER — TELEPHONE (OUTPATIENT)
Dept: FAMILY MEDICINE CLINIC | Facility: CLINIC | Age: 58
End: 2018-12-10

## 2018-12-10 ENCOUNTER — OFFICE VISIT (OUTPATIENT)
Dept: INTERNAL MEDICINE CLINIC | Facility: CLINIC | Age: 58
End: 2018-12-10
Payer: COMMERCIAL

## 2018-12-10 VITALS
BODY MASS INDEX: 27.49 KG/M2 | SYSTOLIC BLOOD PRESSURE: 112 MMHG | HEART RATE: 84 BPM | TEMPERATURE: 98 F | WEIGHT: 161 LBS | HEIGHT: 64 IN | DIASTOLIC BLOOD PRESSURE: 74 MMHG

## 2018-12-10 DIAGNOSIS — I10 ESSENTIAL HYPERTENSION, BENIGN: Primary | ICD-10-CM

## 2018-12-10 DIAGNOSIS — Z12.39 BREAST SCREENING: Primary | ICD-10-CM

## 2018-12-10 DIAGNOSIS — L23.9 ALLERGIC DERMATITIS: ICD-10-CM

## 2018-12-10 PROCEDURE — 99212 OFFICE O/P EST SF 10 MIN: CPT | Performed by: INTERNAL MEDICINE

## 2018-12-10 PROCEDURE — 99214 OFFICE O/P EST MOD 30 MIN: CPT | Performed by: INTERNAL MEDICINE

## 2018-12-10 RX ORDER — TERBINAFINE HYDROCHLORIDE 250 MG/1
250 TABLET ORAL DAILY
Qty: 30 TABLET | Refills: 0 | Status: SHIPPED | OUTPATIENT
Start: 2018-12-10 | End: 2019-03-04

## 2018-12-10 RX ORDER — BETAMETHASONE DIPROPIONATE 0.5 MG/G
OINTMENT TOPICAL
Qty: 15 G | Refills: 1 | Status: SHIPPED | OUTPATIENT
Start: 2018-12-10

## 2018-12-10 RX ORDER — HYDROCODONE BITARTRATE AND ACETAMINOPHEN 10; 325 MG/1; MG/1
1 TABLET ORAL
Qty: 150 TABLET | Refills: 0 | Status: SHIPPED | OUTPATIENT
Start: 2018-12-29 | End: 2019-01-28

## 2018-12-10 NOTE — PROGRESS NOTES
HPI:    Patient ID: Jeff Dent is a 63year old female. Hypertension   This is a chronic problem. The current episode started more than 1 year ago. The problem is unchanged. The problem is controlled.  Pertinent negatives include no anxiety, blurred v Current Outpatient Medications:  Betamethasone Dipropionate Aug (DIPROLENE) 0.05 % External Ointment Apply bid Disp: 15 g Rfl: 1   [START ON 12/29/2018] HYDROcodone-acetaminophen  MG Oral Tab Take 1 tablet by mouth every 4 to 6 hours as needed for Pa EPIPEN 2-ROSANNE 0.3 MG/0.3ML Injection Solution Auto-injector  Disp:  Rfl: 2   nystatin-triamcinolone (MYCOLOG) 988952-6.1 UNIT/GM-% Apply Externally Ointment  Disp:  Rfl: 0   DiphenhydrAMINE HCl (BENADRYL ALLERGY OR) Take  by mouth.  Disp:  Rfl:    Hydrocorti Comment:Other reaction(s): TRAMADOL HCL   PHYSICAL EXAM:   Physical Exam   Constitutional: She is oriented to person, place, and time. She appears well-developed.    HENT:   Mouth/Throat: Oropharynx is clear and moist.   Eyes: EOM are normal. Pupils are e

## 2018-12-11 NOTE — TELEPHONE ENCOUNTER
Needs a order for a Mammogram--will mail to her. Also wants to know if the prescription for her toe fungus could be sent to her pharmacy. ..       Alfonso Kate

## 2018-12-29 ENCOUNTER — TELEPHONE (OUTPATIENT)
Dept: INTERNAL MEDICINE CLINIC | Facility: CLINIC | Age: 58
End: 2018-12-29

## 2018-12-29 NOTE — TELEPHONE ENCOUNTER
Per pharmacy pt insurance does not cover Terbinafine 250mg. Please call pt plan at [phone] to initiate PA.  Patient ID# [id number]

## 2019-01-27 NOTE — TELEPHONE ENCOUNTER
Med not part of protocol    · See LFT below.    Recent Outpatient Visits            1 month ago Essential hypertension, benign    [facility], [address], MD Claudia    Office Visit    4 months ago Essential hypertension, benign    [facility]

## 2019-01-28 ENCOUNTER — TELEPHONE (OUTPATIENT)
Dept: FAMILY MEDICINE CLINIC | Facility: CLINIC | Age: 59
End: 2019-01-28

## 2019-01-28 RX ORDER — HYDROCODONE BITARTRATE AND ACETAMINOPHEN 10; 325 MG/1; MG/1
1 TABLET ORAL
Qty: 150 TABLET | Refills: 0 | Status: SHIPPED | OUTPATIENT
Start: 2019-01-28 | End: 2019-02-26

## 2019-01-28 RX ORDER — TERBINAFINE HYDROCHLORIDE 250 MG/1
TABLET ORAL
Qty: 30 TABLET | Refills: 3 | Status: SHIPPED | OUTPATIENT
Start: 2019-01-28 | End: 2019-09-17 | Stop reason: ALTCHOICE

## 2019-02-26 ENCOUNTER — TELEPHONE (OUTPATIENT)
Dept: FAMILY MEDICINE CLINIC | Facility: CLINIC | Age: 59
End: 2019-02-26

## 2019-02-26 RX ORDER — HYDROCODONE BITARTRATE AND ACETAMINOPHEN 10; 325 MG/1; MG/1
1 TABLET ORAL
Qty: 150 TABLET | Refills: 0 | Status: SHIPPED | OUTPATIENT
Start: 2019-02-27 | End: 2019-03-11

## 2019-03-11 ENCOUNTER — OFFICE VISIT (OUTPATIENT)
Dept: INTERNAL MEDICINE CLINIC | Facility: CLINIC | Age: 59
End: 2019-03-11
Payer: COMMERCIAL

## 2019-03-11 VITALS
TEMPERATURE: 98 F | BODY MASS INDEX: 26.98 KG/M2 | DIASTOLIC BLOOD PRESSURE: 70 MMHG | HEART RATE: 80 BPM | HEIGHT: 64 IN | SYSTOLIC BLOOD PRESSURE: 110 MMHG | WEIGHT: 158 LBS

## 2019-03-11 DIAGNOSIS — E78.00 PURE HYPERCHOLESTEROLEMIA: ICD-10-CM

## 2019-03-11 DIAGNOSIS — E11.9 DIABETES MELLITUS WITHOUT COMPLICATION (HCC): ICD-10-CM

## 2019-03-11 DIAGNOSIS — I10 ESSENTIAL HYPERTENSION, BENIGN: Primary | ICD-10-CM

## 2019-03-11 DIAGNOSIS — M05.771 RHEUMATOID ARTHRITIS INVOLVING BOTH FEET WITH POSITIVE RHEUMATOID FACTOR (HCC): ICD-10-CM

## 2019-03-11 DIAGNOSIS — L60.8 ACQUIRED DYSMORPHIC TOENAIL: ICD-10-CM

## 2019-03-11 DIAGNOSIS — M05.772 RHEUMATOID ARTHRITIS INVOLVING BOTH FEET WITH POSITIVE RHEUMATOID FACTOR (HCC): ICD-10-CM

## 2019-03-11 PROCEDURE — 99214 OFFICE O/P EST MOD 30 MIN: CPT | Performed by: INTERNAL MEDICINE

## 2019-03-11 PROCEDURE — 99212 OFFICE O/P EST SF 10 MIN: CPT | Performed by: INTERNAL MEDICINE

## 2019-03-11 RX ORDER — SULFAMETHOXAZOLE AND TRIMETHOPRIM 800; 160 MG/1; MG/1
TABLET ORAL
Refills: 1 | COMMUNITY
Start: 2019-03-05 | End: 2019-06-11 | Stop reason: ALTCHOICE

## 2019-03-11 RX ORDER — FEXOFENADINE HCL 180 MG/1
180 TABLET ORAL DAILY PRN
COMMUNITY
End: 2021-09-28

## 2019-03-11 RX ORDER — HYDROCODONE BITARTRATE AND ACETAMINOPHEN 10; 325 MG/1; MG/1
1 TABLET ORAL
Qty: 150 TABLET | Refills: 0 | Status: SHIPPED | OUTPATIENT
Start: 2019-03-27 | End: 2019-04-25

## 2019-03-11 RX ORDER — SULFAMETHOXAZOLE AND TRIMETHOPRIM 800; 160 MG/1; MG/1
1 TABLET ORAL 2 TIMES DAILY
Qty: 14 TABLET | Refills: 0 | Status: SHIPPED | OUTPATIENT
Start: 2019-03-11 | End: 2019-06-11 | Stop reason: ALTCHOICE

## 2019-03-11 RX ORDER — CETIRIZINE HYDROCHLORIDE 10 MG/1
10 CAPSULE, LIQUID FILLED ORAL DAILY PRN
COMMUNITY

## 2019-03-11 NOTE — PROGRESS NOTES
HPI:    Patient ID: Kanika Adam is a 63year old female.     HPI    Review of Systems         Current Outpatient Medications:  Sulfamethoxazole-TMP -160 MG Oral Tab per tablet TK 1 T PO Q 12 H  FOR 7 DAYS Disp:  Rfl: 1   Cetirizine HCl (ZYRTEC ALLER lisinopril 40 MG Oral Tab Take 1 tablet (40 mg total) by mouth once daily.  (Patient taking differently: Take 20 mg by mouth once daily.  ) Disp: 90 tablet Rfl: 3   omalizumab (XOLAIR) 150 MG Subcutaneous Recon Soln RECONSTITUTE EACH OF 2 VIALS WITH 1.4ML Comment:Other reaction(s): LEVOFLOXACIN             Other reaction(s): LEVOFLOXACIN  Morphine                    Comment:Other reaction(s): MORPHINE  Penicillins                 Comment:Other reaction(s): PENICILLINS             Other reaction(s): PENI

## 2019-03-11 NOTE — PROGRESS NOTES
HPI:    Patient ID: Sarbjit Reyes is a 63year old female. HPIHypertension   This is a chronic problem. The current episode started more than 1 year ago. The problem is unchanged. The problem is controlled.  Pertinent negatives include no anxiety, blurre Neurological: Negative for dizziness, syncope, facial asymmetry, weakness, light-headedness, numbness and headaches.             Current Outpatient Medications:  Sulfamethoxazole-TMP -160 MG Oral Tab per tablet TK 1 T PO Q 12 H  FOR 7 DAYS Disp:  Rf times daily. Disp: 180 tablet Rfl: 3   lisinopril 40 MG Oral Tab Take 1 tablet (40 mg total) by mouth once daily.  (Patient taking differently: Take 20 mg by mouth once daily.  ) Disp: 90 tablet Rfl: 3   omalizumab (XOLAIR) 150 MG Subcutaneous Recon Soln RE reaction(s): LATEX  Levofloxacin                Comment:Other reaction(s): LEVOFLOXACIN             Other reaction(s): LEVOFLOXACIN  Morphine                    Comment:Other reaction(s): MORPHINE  Penicillins                 Comment:Other reaction(s): PEN Visit:  Requested Prescriptions     Signed Prescriptions Disp Refills   • Sulfamethoxazole-TMP DS (BACTRIM DS) 800-160 MG Oral Tab per tablet 14 tablet 0     Sig: Take 1 tablet by mouth 2 (two) times daily.    • HYDROcodone-acetaminophen  MG Oral Tab

## 2019-03-12 ENCOUNTER — TELEPHONE (OUTPATIENT)
Dept: FAMILY MEDICINE CLINIC | Facility: CLINIC | Age: 59
End: 2019-03-12

## 2019-03-12 LAB — HEMOGLOBIN A1C: 5.3 % OF TOTAL HGB

## 2019-03-12 NOTE — TELEPHONE ENCOUNTER
Patient stated that she spoke with you yesterday about her medication refills, I asked her details, and she said you already knew about it. Maria Antonia Michaels

## 2019-03-25 ENCOUNTER — TELEPHONE (OUTPATIENT)
Dept: FAMILY MEDICINE CLINIC | Facility: CLINIC | Age: 59
End: 2019-03-25

## 2019-03-25 RX ORDER — ATORVASTATIN CALCIUM 20 MG/1
20 TABLET, FILM COATED ORAL
Qty: 90 TABLET | Refills: 3 | Status: SHIPPED | OUTPATIENT
Start: 2019-03-25 | End: 2019-10-28

## 2019-03-25 RX ORDER — HYDROCHLOROTHIAZIDE 12.5 MG/1
12.5 CAPSULE, GELATIN COATED ORAL
Qty: 90 CAPSULE | Refills: 3 | Status: SHIPPED | OUTPATIENT
Start: 2019-03-25 | End: 2020-03-17

## 2019-03-25 RX ORDER — LISINOPRIL 20 MG/1
20 TABLET ORAL
Qty: 90 TABLET | Refills: 3 | Status: SHIPPED | OUTPATIENT
Start: 2019-03-25 | End: 2020-03-17

## 2019-03-25 RX ORDER — FOLIC ACID 1 MG/1
1 TABLET ORAL
Qty: 90 TABLET | Refills: 3 | Status: SHIPPED | OUTPATIENT
Start: 2019-03-25 | End: 2019-10-28

## 2019-03-25 RX ORDER — AMLODIPINE BESYLATE 5 MG/1
5 TABLET ORAL DAILY
Qty: 90 TABLET | Refills: 3 | Status: SHIPPED | OUTPATIENT
Start: 2019-03-25 | End: 2020-03-17

## 2019-03-25 RX ORDER — ERGOCALCIFEROL 1.25 MG/1
CAPSULE ORAL
Qty: 12 CAPSULE | Refills: 3 | Status: SHIPPED | OUTPATIENT
Start: 2019-03-25 | End: 2020-03-17

## 2019-03-25 NOTE — TELEPHONE ENCOUNTER
NEEDS REFILLS FOR:    ATORVASTATIN 80PY  FOLIC ACID 1MG  HYDROXYCHLOROQUINE 200MG  METFORMIN 500 MG  VITAMIN D2  LISINOPRIL 40MG  METHOTREXATE 2.5 MG  AMLODIPINE 5 MG    SHE WANTS TO  SCRIPTS ON Friday.

## 2019-03-28 RX ORDER — HYDROXYCHLOROQUINE SULFATE 200 MG/1
TABLET, FILM COATED ORAL
Qty: 180 TABLET | Refills: 3 | Status: SHIPPED | OUTPATIENT
Start: 2019-03-28 | End: 2020-03-17

## 2019-03-29 ENCOUNTER — TELEPHONE (OUTPATIENT)
Dept: INTERNAL MEDICINE CLINIC | Facility: CLINIC | Age: 59
End: 2019-03-29

## 2019-03-29 RX ORDER — METFORMIN HYDROCHLORIDE 500 MG/1
500 TABLET, EXTENDED RELEASE ORAL 2 TIMES DAILY WITH MEALS
Qty: 180 TABLET | Refills: 3 | Status: SHIPPED | OUTPATIENT
Start: 2019-03-29 | End: 2020-03-17

## 2019-03-29 NOTE — TELEPHONE ENCOUNTER
Per Rajeev, Metformin 500mg tabs is not covered under patient's insurance, the preferred alternative is Metformin ER tablet. Please fax back to Rajeev the medication change along with strength, directions, quantity and refills.

## 2019-04-25 ENCOUNTER — TELEPHONE (OUTPATIENT)
Dept: FAMILY MEDICINE CLINIC | Facility: CLINIC | Age: 59
End: 2019-04-25

## 2019-04-25 RX ORDER — HYDROCODONE BITARTRATE AND ACETAMINOPHEN 10; 325 MG/1; MG/1
1 TABLET ORAL
Qty: 150 TABLET | Refills: 0 | Status: SHIPPED | OUTPATIENT
Start: 2019-04-27 | End: 2019-05-24

## 2019-05-09 ENCOUNTER — PATIENT OUTREACH (OUTPATIENT)
Dept: CASE MANAGEMENT | Age: 59
End: 2019-05-09

## 2019-05-09 NOTE — PROGRESS NOTES
S/w patient. She declined to answer any questions regarding her hospitalization except that she was discharged on 5/2/2019. She states that she will discuss why she was in there with Dr. Janna Hutson at her appt in June.  [facility] unable to obtain any further inform

## 2019-05-24 ENCOUNTER — TELEPHONE (OUTPATIENT)
Dept: FAMILY MEDICINE CLINIC | Facility: CLINIC | Age: 59
End: 2019-05-24

## 2019-05-24 RX ORDER — HYDROCODONE BITARTRATE AND ACETAMINOPHEN 10; 325 MG/1; MG/1
1 TABLET ORAL
Qty: 150 TABLET | Refills: 0 | Status: SHIPPED | OUTPATIENT
Start: 2019-05-24 | End: 2019-06-11

## 2019-05-24 NOTE — TELEPHONE ENCOUNTER
REQUESTING NORCO FOR TODAY, I TOLD HER SHE NEEDS TO CALL A DAY BEFORE, CANT BE THE LAST MINUTE. Bradley Wyatt

## 2019-06-11 ENCOUNTER — OFFICE VISIT (OUTPATIENT)
Dept: INTERNAL MEDICINE CLINIC | Facility: CLINIC | Age: 59
End: 2019-06-11
Payer: COMMERCIAL

## 2019-06-11 VITALS
HEIGHT: 64 IN | DIASTOLIC BLOOD PRESSURE: 70 MMHG | BODY MASS INDEX: 27.66 KG/M2 | WEIGHT: 162 LBS | TEMPERATURE: 98 F | HEART RATE: 84 BPM | SYSTOLIC BLOOD PRESSURE: 122 MMHG

## 2019-06-11 DIAGNOSIS — E11.9 DIABETES MELLITUS WITHOUT COMPLICATION (HCC): ICD-10-CM

## 2019-06-11 DIAGNOSIS — M05.771 RHEUMATOID ARTHRITIS INVOLVING BOTH FEET WITH POSITIVE RHEUMATOID FACTOR (HCC): ICD-10-CM

## 2019-06-11 DIAGNOSIS — M05.772 RHEUMATOID ARTHRITIS INVOLVING BOTH FEET WITH POSITIVE RHEUMATOID FACTOR (HCC): ICD-10-CM

## 2019-06-11 DIAGNOSIS — I10 ESSENTIAL HYPERTENSION, BENIGN: Primary | ICD-10-CM

## 2019-06-11 DIAGNOSIS — E78.00 PURE HYPERCHOLESTEROLEMIA: ICD-10-CM

## 2019-06-11 PROCEDURE — 99212 OFFICE O/P EST SF 10 MIN: CPT | Performed by: INTERNAL MEDICINE

## 2019-06-11 PROCEDURE — 99214 OFFICE O/P EST MOD 30 MIN: CPT | Performed by: INTERNAL MEDICINE

## 2019-08-07 NOTE — ASSESSMENT & PLAN NOTE
Chol is controlled.
Stable , sees rheumatology.
Well controlled on meds.
bp well controlled on meds.
lexiscan   Zantac 300 daily . Allergic to prevacid.
No

## 2019-09-17 ENCOUNTER — OFFICE VISIT (OUTPATIENT)
Dept: INTERNAL MEDICINE CLINIC | Facility: CLINIC | Age: 59
End: 2019-09-17
Payer: COMMERCIAL

## 2019-09-17 ENCOUNTER — APPOINTMENT (OUTPATIENT)
Dept: LAB | Facility: HOSPITAL | Age: 59
End: 2019-09-17
Attending: INTERNAL MEDICINE
Payer: COMMERCIAL

## 2019-09-17 VITALS
SYSTOLIC BLOOD PRESSURE: 94 MMHG | BODY MASS INDEX: 26.12 KG/M2 | WEIGHT: 153 LBS | TEMPERATURE: 97 F | DIASTOLIC BLOOD PRESSURE: 70 MMHG | HEART RATE: 88 BPM | HEIGHT: 64 IN

## 2019-09-17 DIAGNOSIS — E78.00 PURE HYPERCHOLESTEROLEMIA: ICD-10-CM

## 2019-09-17 DIAGNOSIS — I10 ESSENTIAL HYPERTENSION, BENIGN: Primary | ICD-10-CM

## 2019-09-17 DIAGNOSIS — M05.772 RHEUMATOID ARTHRITIS INVOLVING BOTH FEET WITH POSITIVE RHEUMATOID FACTOR (HCC): ICD-10-CM

## 2019-09-17 DIAGNOSIS — M05.771 RHEUMATOID ARTHRITIS INVOLVING BOTH FEET WITH POSITIVE RHEUMATOID FACTOR (HCC): ICD-10-CM

## 2019-09-17 DIAGNOSIS — E11.9 DIABETES MELLITUS WITHOUT COMPLICATION (HCC): ICD-10-CM

## 2019-09-17 PROCEDURE — 99214 OFFICE O/P EST MOD 30 MIN: CPT | Performed by: INTERNAL MEDICINE

## 2019-09-17 PROCEDURE — 36415 COLL VENOUS BLD VENIPUNCTURE: CPT

## 2019-09-17 NOTE — PROGRESS NOTES
HPIHypertension   This is a chronic problem. The current episode started more than 1 year ago. The problem is unchanged. The problem is controlled.  Pertinent negatives include no anxiety, blurred vision, chest pain, headaches, malaise/fatigue, neck pain, immunocompromised state. Neurological: Negative for dizziness, syncope, facial asymmetry, weakness, light-headedness, numbness and headaches.             Current Outpatient Medications:  metFORMIN HCl  MG Oral Tablet 24 Hr Take 1 tablet (500 mg tota Rfl: 0   EPIPEN 2-ROSANNE 0.3 MG/0.3ML Injection Solution Auto-injector  Disp:  Rfl: 2   Hydrocortisone Valerate (WESTCORT) 0.2 % Apply Externally Cream  Disp:  Rfl: 0   brimonidine Tartrate (ALPHAGAN) 0.2 % Ophthalmic Solution Apply  to eye. instill 1 drop by time. She appears well-developed. HENT:   Mouth/Throat: Oropharynx is clear and moist.   Eyes: Pupils are equal, round, and reactive to light. EOM are normal.   Neck: Normal range of motion. No thyromegaly present.    Cardiovascular: Normal rate, regular

## 2019-09-18 LAB — HEMOGLOBIN A1C: 5.6 % OF TOTAL HGB

## 2019-10-29 RX ORDER — ATORVASTATIN CALCIUM 20 MG/1
TABLET, FILM COATED ORAL
Qty: 90 TABLET | Refills: 0 | Status: SHIPPED | OUTPATIENT
Start: 2019-10-29 | End: 2020-03-17

## 2019-10-29 RX ORDER — FOLIC ACID 1 MG/1
TABLET ORAL
Qty: 90 TABLET | Refills: 0 | Status: SHIPPED | OUTPATIENT
Start: 2019-10-29 | End: 2020-03-17

## 2019-11-19 ENCOUNTER — TELEPHONE (OUTPATIENT)
Dept: FAMILY MEDICINE CLINIC | Facility: CLINIC | Age: 59
End: 2019-11-19

## 2019-11-19 NOTE — TELEPHONE ENCOUNTER
Patient has to take a CPR class at work and would like a note to be excused from when she has to do the floor exercises, because of her leg that was broken.     Will  on Thursday

## 2019-12-10 ENCOUNTER — OFFICE VISIT (OUTPATIENT)
Dept: INTERNAL MEDICINE CLINIC | Facility: CLINIC | Age: 59
End: 2019-12-10
Payer: COMMERCIAL

## 2019-12-10 VITALS
WEIGHT: 171 LBS | HEIGHT: 64 IN | SYSTOLIC BLOOD PRESSURE: 120 MMHG | DIASTOLIC BLOOD PRESSURE: 76 MMHG | BODY MASS INDEX: 29.19 KG/M2 | HEART RATE: 88 BPM | TEMPERATURE: 98 F

## 2019-12-10 DIAGNOSIS — M05.772 RHEUMATOID ARTHRITIS INVOLVING BOTH FEET WITH POSITIVE RHEUMATOID FACTOR (HCC): ICD-10-CM

## 2019-12-10 DIAGNOSIS — I10 ESSENTIAL HYPERTENSION, BENIGN: Primary | ICD-10-CM

## 2019-12-10 DIAGNOSIS — E11.9 DIABETES MELLITUS WITHOUT COMPLICATION (HCC): ICD-10-CM

## 2019-12-10 DIAGNOSIS — E78.00 PURE HYPERCHOLESTEROLEMIA: ICD-10-CM

## 2019-12-10 DIAGNOSIS — J06.9 URI, ACUTE: ICD-10-CM

## 2019-12-10 DIAGNOSIS — M05.771 RHEUMATOID ARTHRITIS INVOLVING BOTH FEET WITH POSITIVE RHEUMATOID FACTOR (HCC): ICD-10-CM

## 2019-12-10 PROCEDURE — 99214 OFFICE O/P EST MOD 30 MIN: CPT | Performed by: INTERNAL MEDICINE

## 2019-12-10 RX ORDER — SULFAMETHOXAZOLE AND TRIMETHOPRIM 800; 160 MG/1; MG/1
TABLET ORAL
Qty: 20 TABLET | Refills: 1 | Status: SHIPPED | OUTPATIENT
Start: 2019-12-10 | End: 2020-03-17 | Stop reason: ALTCHOICE

## 2019-12-10 NOTE — PROGRESS NOTES
HPI:    Patient ID: Liza Singleton is a 63year old female. Cough   This is a new problem. The current episode started in the past 7 days. The problem has been unchanged. The problem occurs constantly. The cough is productive of purulent sputum.  Associat Respiratory: Positive for cough. Negative for apnea, hemoptysis, chest tightness, shortness of breath and wheezing. Cardiovascular: Negative for chest pain, palpitations and leg swelling.    Gastrointestinal: Negative for abdominal distention, blood in MG Oral Tab Take 180 mg by mouth. • sodium chloride 0.9% SOLN with tocilizumab 200 MG/10ML SOLN 4 mg/kg Inject 8 mg/kg into the vein once.      • Betamethasone Dipropionate Aug (DIPROLENE) 0.05 % External Ointment Apply bid 15 g 1   • DiphenhydrAMINE HC Comment:Other reaction(s): LATEX  Levofloxacin                Comment:Other reaction(s): LEVOFLOXACIN             Other reaction(s): LEVOFLOXACIN  Morphine                    Comment:Other reaction(s): MORPHINE  Penicillins                 Comment were placed in this encounter.       Meds This Visit:  Requested Prescriptions     Signed Prescriptions Disp Refills   • Sulfamethoxazole-TMP -160 MG Oral Tab per tablet 20 tablet 1     Sig: TK 1 T PO Q 12 H  FOR 7 DAYS   • hydrocortisone 2.5 % Extern

## 2020-02-27 ENCOUNTER — TELEPHONE (OUTPATIENT)
Dept: FAMILY MEDICINE CLINIC | Facility: CLINIC | Age: 60
End: 2020-02-27

## 2020-02-27 DIAGNOSIS — Z12.39 BREAST SCREENING: Primary | ICD-10-CM

## 2020-03-17 ENCOUNTER — OFFICE VISIT (OUTPATIENT)
Dept: INTERNAL MEDICINE CLINIC | Facility: CLINIC | Age: 60
End: 2020-03-17
Payer: COMMERCIAL

## 2020-03-17 VITALS
TEMPERATURE: 98 F | DIASTOLIC BLOOD PRESSURE: 80 MMHG | BODY MASS INDEX: 29.88 KG/M2 | HEART RATE: 88 BPM | SYSTOLIC BLOOD PRESSURE: 120 MMHG | HEIGHT: 64 IN | WEIGHT: 175 LBS

## 2020-03-17 DIAGNOSIS — I10 ESSENTIAL HYPERTENSION, BENIGN: Primary | ICD-10-CM

## 2020-03-17 DIAGNOSIS — E78.00 PURE HYPERCHOLESTEROLEMIA: ICD-10-CM

## 2020-03-17 DIAGNOSIS — M05.771 RHEUMATOID ARTHRITIS INVOLVING BOTH FEET WITH POSITIVE RHEUMATOID FACTOR (HCC): ICD-10-CM

## 2020-03-17 DIAGNOSIS — Z78.0 MENOPAUSE: ICD-10-CM

## 2020-03-17 DIAGNOSIS — M05.772 RHEUMATOID ARTHRITIS INVOLVING BOTH FEET WITH POSITIVE RHEUMATOID FACTOR (HCC): ICD-10-CM

## 2020-03-17 DIAGNOSIS — E11.9 DIABETES MELLITUS WITHOUT COMPLICATION (HCC): ICD-10-CM

## 2020-03-17 PROCEDURE — 99214 OFFICE O/P EST MOD 30 MIN: CPT | Performed by: INTERNAL MEDICINE

## 2020-03-17 RX ORDER — CYCLOBENZAPRINE HCL 10 MG
TABLET ORAL
COMMUNITY
Start: 2020-01-21 | End: 2020-03-17

## 2020-03-17 RX ORDER — CYCLOBENZAPRINE HCL 10 MG
TABLET ORAL
Qty: 60 TABLET | Refills: 3 | Status: SHIPPED | OUTPATIENT
Start: 2020-03-17 | End: 2020-09-30

## 2020-03-17 RX ORDER — AMLODIPINE BESYLATE 5 MG/1
5 TABLET ORAL DAILY
Qty: 90 TABLET | Refills: 3 | Status: SHIPPED | OUTPATIENT
Start: 2020-03-17 | End: 2020-10-15

## 2020-03-17 RX ORDER — METFORMIN HYDROCHLORIDE 500 MG/1
500 TABLET, EXTENDED RELEASE ORAL 2 TIMES DAILY WITH MEALS
Qty: 180 TABLET | Refills: 3 | Status: SHIPPED | OUTPATIENT
Start: 2020-03-17 | End: 2021-03-29

## 2020-03-17 RX ORDER — ERGOCALCIFEROL 1.25 MG/1
CAPSULE ORAL
Qty: 12 CAPSULE | Refills: 3 | Status: SHIPPED | OUTPATIENT
Start: 2020-03-17 | End: 2021-03-23

## 2020-03-17 RX ORDER — ATORVASTATIN CALCIUM 20 MG/1
20 TABLET, FILM COATED ORAL
Qty: 90 TABLET | Refills: 3 | Status: SHIPPED | OUTPATIENT
Start: 2020-03-17 | End: 2021-03-29

## 2020-03-17 RX ORDER — FOLIC ACID 1 MG/1
1 TABLET ORAL
Qty: 90 TABLET | Refills: 3 | Status: SHIPPED | OUTPATIENT
Start: 2020-03-17 | End: 2021-03-29

## 2020-03-17 RX ORDER — LISINOPRIL 20 MG/1
20 TABLET ORAL
Qty: 90 TABLET | Refills: 3 | Status: SHIPPED | OUTPATIENT
Start: 2020-03-17 | End: 2021-03-29

## 2020-03-17 RX ORDER — HYDROCHLOROTHIAZIDE 12.5 MG/1
12.5 CAPSULE, GELATIN COATED ORAL
Qty: 90 CAPSULE | Refills: 3 | Status: ON HOLD | OUTPATIENT
Start: 2020-03-17 | End: 2020-10-09

## 2020-03-17 RX ORDER — AMLODIPINE BESYLATE 5 MG/1
5 TABLET ORAL DAILY
Qty: 90 TABLET | Refills: 3 | Status: SHIPPED | OUTPATIENT
Start: 2020-03-17 | End: 2020-03-17

## 2020-03-17 RX ORDER — HYDROXYCHLOROQUINE SULFATE 200 MG/1
TABLET, FILM COATED ORAL
Qty: 180 TABLET | Refills: 3 | Status: SHIPPED | OUTPATIENT
Start: 2020-03-17 | End: 2021-07-01

## 2020-03-17 NOTE — PROGRESS NOTES
HPIHypertension   This is a chronic problem. The current episode started more than 1 year ago. The problem is unchanged. The problem is controlled.  Pertinent negatives include no anxiety, blurred vision, chest pain, headaches, malaise/fatigue, neck pain, Negative for dizziness, syncope, facial asymmetry, weakness, light-headedness, numbness and headaches.             Current Outpatient Medications   Medication Sig Dispense Refill   • atorvastatin 20 MG Oral Tab Take 1 tablet (20 mg total) by mouth once lianet Cream   0   • Glucose Blood (RA TRUETEST TEST) In Vitro Strip take by Intradermal route check  glucose 3 times a day     • brimonidine Tartrate (ALPHAGAN) 0.2 % Ophthalmic Solution Apply  to eye. instill 1 drop by ophthalmic route  every 8 hours into affec Normal range of motion. No thyromegaly present. Cardiovascular: Normal rate, regular rhythm, normal heart sounds and intact distal pulses. Exam reveals no gallop. No murmur heard.   Pulmonary/Chest: Effort normal and breath sounds normal. No respiratory MG Oral Tab 180 tablet 3     Sig: TAKE 1 TABLET(200 MG) BY MOUTH TWICE DAILY   • lisinopril 20 MG Oral Tab 90 tablet 3     Sig: Take 1 tablet (20 mg total) by mouth once daily.    • metFORMIN HCl  MG Oral Tablet 24 Hr 180 tablet 3     Sig: Take 1 tabl

## 2020-03-17 NOTE — ASSESSMENT & PLAN NOTE
Saw Dr Levine Areas last Fri, stable. Has right wrist swelling and tenderness , no pain with exertion .

## 2020-03-18 LAB — HEMOGLOBIN A1C: 5.5 % OF TOTAL HGB

## 2020-03-26 ENCOUNTER — TELEPHONE (OUTPATIENT)
Dept: INTERNAL MEDICINE CLINIC | Facility: CLINIC | Age: 60
End: 2020-03-26

## 2020-03-26 DIAGNOSIS — M25.531 RIGHT WRIST PAIN: Primary | ICD-10-CM

## 2020-04-02 ENCOUNTER — TELEPHONE (OUTPATIENT)
Dept: FAMILY MEDICINE CLINIC | Facility: CLINIC | Age: 60
End: 2020-04-02

## 2020-04-02 NOTE — TELEPHONE ENCOUNTER
Requesting  new prescriptions for 90 days for    Atorvastatin 20 mg   Cyclobenzaprine 10mg  Hydrochlorothiazide 12.5 mg  Hydroxychloroquine Sulfate 200 mg  Lisinopril 20 mg  Metformin 500 mg  Amlopidine 5 mg  Vitamin D  Methotrexate 2.5 mg  Folic Acid 1 mg    WANTS TO --IS FINDING A [facility] PHARM[facility]Y.     AVOID THIS MESSAGE,CHERI GAVE HER ALL HER ORIGINAL PRESCRIPTIONS BACK---

## 2020-04-02 NOTE — TELEPHONE ENCOUNTER
Patient states that the wrong Metformin was sent to the pharmacy---it was the extended release ? ? Do not see on the med.  List.      Catrina Shafer ILL

## 2020-04-06 ENCOUNTER — HOSPITAL ENCOUNTER (OUTPATIENT)
Dept: GENERAL RADIOLOGY | Facility: HOSPITAL | Age: 60
Discharge: HOME | End: 2020-04-06
Attending: INTERNAL MEDICINE
Payer: COMMERCIAL

## 2020-04-06 DIAGNOSIS — M25.531 RIGHT WRIST PAIN: ICD-10-CM

## 2020-04-06 PROCEDURE — 73110 X-RAY EXAM OF WRIST: CPT | Performed by: INTERNAL MEDICINE

## 2020-04-10 ENCOUNTER — TELEPHONE (OUTPATIENT)
Dept: FAMILY MEDICINE CLINIC | Facility: CLINIC | Age: 60
End: 2020-04-10

## 2020-06-23 ENCOUNTER — OFFICE VISIT (OUTPATIENT)
Dept: INTERNAL MEDICINE CLINIC | Facility: CLINIC | Age: 60
End: 2020-06-23
Payer: COMMERCIAL

## 2020-06-23 VITALS
DIASTOLIC BLOOD PRESSURE: 76 MMHG | HEIGHT: 64 IN | WEIGHT: 175 LBS | SYSTOLIC BLOOD PRESSURE: 120 MMHG | BODY MASS INDEX: 29.88 KG/M2 | TEMPERATURE: 98 F | HEART RATE: 92 BPM

## 2020-06-23 DIAGNOSIS — I10 ESSENTIAL HYPERTENSION, BENIGN: Primary | ICD-10-CM

## 2020-06-23 DIAGNOSIS — E11.9 DIABETES MELLITUS WITHOUT COMPLICATION (HCC): ICD-10-CM

## 2020-06-23 DIAGNOSIS — E78.00 PURE HYPERCHOLESTEROLEMIA: ICD-10-CM

## 2020-06-23 PROCEDURE — 99214 OFFICE O/P EST MOD 30 MIN: CPT | Performed by: INTERNAL MEDICINE

## 2020-06-23 RX ORDER — EPINEPHRINE 0.3 MG/.3ML
0.3 INJECTION SUBCUTANEOUS DAILY PRN
Qty: 2 EACH | Refills: 2 | Status: SHIPPED | OUTPATIENT
Start: 2020-06-23

## 2020-06-23 NOTE — ASSESSMENT & PLAN NOTE
bp is controlled on meds. Patient had a steroid shot in the right wrist for RA and synovitis, will have another in 2 weeks.

## 2020-06-23 NOTE — PROGRESS NOTES
HPIHypertension   This is a chronic problem. The current episode started more than 1 year ago. The problem is unchanged. The problem is controlled.  Pertinent negatives include no anxiety, blurred vision, chest pain, headaches, malaise/fatigue, neck pain, light-headedness, numbness and headaches. Current Outpatient Medications   Medication Sig Dispense Refill   • EPINEPHrine (EPIPEN 2-ROSANNE) 0.3 MG/0.3ML Injection Solution Auto-injector Inject 0.3 mL (1 each total) into the skin daily as needed.  2 % Apply Externally Cream   0   • brimonidine Tartrate (ALPHAGAN) 0.2 % Ophthalmic Solution Apply  to eye. instill 1 drop by ophthalmic route  every 8 hours into affected eye(s)     • metFORMIN HCl 500 MG Oral Tab TAKE 1 TABLET BY MOUTH TWICE DAILY WITH DESMOND appears well-developed. HENT:   Mouth/Throat: Oropharynx is clear and moist.   Eyes: Pupils are equal, round, and reactive to light. EOM are normal.   Neck: Normal range of motion. No thyromegaly present.    Cardiovascular: Normal rate, regular rhythm, no

## 2020-07-31 ENCOUNTER — TELEPHONE (OUTPATIENT)
Dept: FAMILY MEDICINE CLINIC | Facility: CLINIC | Age: 60
End: 2020-07-31

## 2020-08-04 ENCOUNTER — TELEPHONE (OUTPATIENT)
Dept: FAMILY MEDICINE CLINIC | Facility: CLINIC | Age: 60
End: 2020-08-04

## 2020-08-04 RX ORDER — CARISOPRODOL 350 MG/1
350 TABLET ORAL 2 TIMES DAILY PRN
Qty: 60 TABLET | Refills: 1 | Status: ON HOLD | OUTPATIENT
Start: 2020-08-04 | End: 2020-10-09

## 2020-08-04 NOTE — TELEPHONE ENCOUNTER
Patient called last week regarding a refill for this medication. I could not find Soma in her medication list at all. She has a history of cyclobenzaprine. She would like the [facility] sent to her pharmacy. Please advise.

## 2020-08-04 NOTE — TELEPHONE ENCOUNTER
Concerned that her Luisa Jf was not filled at [facility] as of yet, can you call her to let her know.

## 2020-09-29 ENCOUNTER — OFFICE VISIT (OUTPATIENT)
Dept: INTERNAL MEDICINE CLINIC | Facility: CLINIC | Age: 60
End: 2020-09-29
Payer: COMMERCIAL

## 2020-09-29 VITALS
WEIGHT: 179 LBS | BODY MASS INDEX: 30.56 KG/M2 | DIASTOLIC BLOOD PRESSURE: 84 MMHG | HEART RATE: 84 BPM | SYSTOLIC BLOOD PRESSURE: 136 MMHG | TEMPERATURE: 98 F | HEIGHT: 64 IN

## 2020-09-29 DIAGNOSIS — E78.00 PURE HYPERCHOLESTEROLEMIA: ICD-10-CM

## 2020-09-29 DIAGNOSIS — K62.5 RECTAL BLEEDING: ICD-10-CM

## 2020-09-29 DIAGNOSIS — I10 ESSENTIAL HYPERTENSION, BENIGN: Primary | ICD-10-CM

## 2020-09-29 DIAGNOSIS — E11.9 DIABETES MELLITUS WITHOUT COMPLICATION (HCC): ICD-10-CM

## 2020-09-29 PROCEDURE — 3075F SYST BP GE 130 - 139MM HG: CPT | Performed by: INTERNAL MEDICINE

## 2020-09-29 PROCEDURE — 3079F DIAST BP 80-89 MM HG: CPT | Performed by: INTERNAL MEDICINE

## 2020-09-29 PROCEDURE — 3008F BODY MASS INDEX DOCD: CPT | Performed by: INTERNAL MEDICINE

## 2020-09-29 PROCEDURE — 99214 OFFICE O/P EST MOD 30 MIN: CPT | Performed by: INTERNAL MEDICINE

## 2020-09-29 PROCEDURE — 36415 COLL VENOUS BLD VENIPUNCTURE: CPT | Performed by: INTERNAL MEDICINE

## 2020-09-29 RX ORDER — BETAMETHASONE DIPROPIONATE 0.5 MG/G
1 OINTMENT TOPICAL 2 TIMES DAILY
COMMUNITY
Start: 2020-09-23 | End: 2020-09-29

## 2020-09-29 RX ORDER — HYDROXYZINE HCL 10 MG/5 ML
SOLUTION, ORAL ORAL
COMMUNITY
Start: 2020-09-23

## 2020-09-29 NOTE — ASSESSMENT & PLAN NOTE
See GI , consider cologard since patient has had trouble clearing out for colonoscopies in the past, and was told she has too much scar tissue. Last seen by Dr Richard Henson who is retired. CBC and iron today .    Was told by rheumatology that she was anemi

## 2020-09-29 NOTE — PROGRESS NOTES
HPIHypertension   This is a chronic problem. The current episode started more than 1 year ago. The problem is unchanged. The problem is controlled.  Pertinent negatives include no anxiety, blurred vision, chest pain, headaches, malaise/fatigue, neck pain, facial asymmetry, weakness, light-headedness, numbness and headaches. Current Outpatient Medications   Medication Sig Dispense Refill   • hydrOXYzine HCl 10 MG/5ML Oral Syrup TAKE 5 ML BY MOUTH THREE TIMES DAILY AS NEEDED FOR ITCHING.  INDICATION Aug (DIPROLENE) 0.05 % External Ointment Apply bid 15 g 1   • DiphenhydrAMINE HCl 25 MG Oral Cap Take 25 mg by mouth. • omalizumab (XOLAIR) 150 MG Subcutaneous Recon Soln RECONSTITUTE EACH OF 2 VIALS WITH 1.4ML STERILE WATER.  INJECT 1.2ML SEPARATELY LOPEZ Comment:Other reaction(s): TRAMADOL HCL   PHYSICAL EXAM:   /84   Pulse 84   Temp 97.6 °F (36.4 °C)   Ht 5' 4\" (1.626 m)   Wt 179 lb (81.2 kg)   BMI 30.73 kg/m²      Physical Exam   Constitutional: She is oriented to person, place, and Imaging & Referrals:  [facility]STRO - INTERNAL       TN#[id number]

## 2020-09-30 LAB
% SATURATION: 37 % (CALC) (ref 16–45)
ABSOLUTE BASOPHILS: 31 CELLS/UL (ref 0–200)
ABSOLUTE EOSINOPHILS: 51 CELLS/UL (ref 15–500)
ABSOLUTE LYMPHOCYTES: 1303 CELLS/UL (ref 850–3900)
ABSOLUTE MONOCYTES: 491 CELLS/UL (ref 200–950)
ABSOLUTE NEUTROPHILS: 2024 CELLS/UL (ref 1500–7800)
BASOPHILS: 0.8 %
EOSINOPHILS: 1.3 %
HEMATOCRIT: 33.3 % (ref 35–45)
HEMOGLOBIN: 11.4 G/DL (ref 11.7–15.5)
IRON BINDING CAPACITY: 378 MCG/DL (CALC) (ref 250–450)
IRON, TOTAL: 141 MCG/DL (ref 45–160)
LYMPHOCYTES: 33.4 %
MCH: 31.5 PG (ref 27–33)
MCHC: 34.2 G/DL (ref 32–36)
MCV: 92 FL (ref 80–100)
MONOCYTES: 12.6 %
MPV: 10 FL (ref 7.5–12.5)
NEUTROPHILS: 51.9 %
PLATELET COUNT: 184 THOUSAND/UL (ref 140–400)
RDW: 13 % (ref 11–15)
RED BLOOD CELL COUNT: 3.62 MILLION/UL (ref 3.8–5.1)
WHITE BLOOD CELL COUNT: 3.9 THOUSAND/UL (ref 3.8–10.8)

## 2020-09-30 RX ORDER — CYCLOBENZAPRINE HCL 10 MG
TABLET ORAL
Qty: 60 TABLET | Refills: 3 | Status: ON HOLD | OUTPATIENT
Start: 2020-09-30 | End: 2020-10-09 | Stop reason: ALTCHOICE

## 2020-10-07 ENCOUNTER — NURSE TRIAGE (OUTPATIENT)
Dept: INTERNAL MEDICINE CLINIC | Facility: CLINIC | Age: 60
End: 2020-10-07

## 2020-10-07 ENCOUNTER — TELEPHONE (OUTPATIENT)
Dept: INTERNAL MEDICINE CLINIC | Facility: CLINIC | Age: 60
End: 2020-10-07

## 2020-10-07 NOTE — TELEPHONE ENCOUNTER
Patient was dizzy and lightheaded at [facility] at [facility]. They sent her down to the ER. They did an EKG which they stated was abnormal and they want to admit her there.  Patient does not want to be admitted at [facility] and would like to speak to D

## 2020-10-08 ENCOUNTER — APPOINTMENT (OUTPATIENT)
Dept: ULTRASOUND IMAGING | Facility: HOSPITAL | Age: 60
DRG: 948 | End: 2020-10-08
Attending: EMERGENCY MEDICINE
Payer: COMMERCIAL

## 2020-10-08 ENCOUNTER — OFFICE VISIT (OUTPATIENT)
Dept: INTERNAL MEDICINE CLINIC | Facility: CLINIC | Age: 60
End: 2020-10-08
Payer: COMMERCIAL

## 2020-10-08 ENCOUNTER — APPOINTMENT (OUTPATIENT)
Dept: GENERAL RADIOLOGY | Facility: HOSPITAL | Age: 60
DRG: 948 | End: 2020-10-08
Attending: EMERGENCY MEDICINE
Payer: COMMERCIAL

## 2020-10-08 ENCOUNTER — TELEPHONE (OUTPATIENT)
Dept: INTERNAL MEDICINE CLINIC | Facility: CLINIC | Age: 60
End: 2020-10-08

## 2020-10-08 ENCOUNTER — HOSPITAL ENCOUNTER (INPATIENT)
Facility: HOSPITAL | Age: 60
LOS: 1 days | Discharge: HOME | DRG: 948 | End: 2020-10-09
Attending: EMERGENCY MEDICINE | Admitting: HOSPITALIST
Payer: COMMERCIAL

## 2020-10-08 VITALS
HEIGHT: 64 IN | SYSTOLIC BLOOD PRESSURE: 110 MMHG | DIASTOLIC BLOOD PRESSURE: 80 MMHG | WEIGHT: 177 LBS | TEMPERATURE: 98 F | HEART RATE: 84 BPM | BODY MASS INDEX: 30.22 KG/M2

## 2020-10-08 DIAGNOSIS — R77.8 ELEVATED TROPONIN: Primary | ICD-10-CM

## 2020-10-08 DIAGNOSIS — R42 LIGHT HEADEDNESS: Primary | ICD-10-CM

## 2020-10-08 DIAGNOSIS — R42 LIGHTHEADEDNESS: ICD-10-CM

## 2020-10-08 DIAGNOSIS — E16.2 HYPOGLYCEMIA: ICD-10-CM

## 2020-10-08 DIAGNOSIS — I10 ESSENTIAL HYPERTENSION, BENIGN: ICD-10-CM

## 2020-10-08 DIAGNOSIS — R94.31 ABNORMAL EKG: ICD-10-CM

## 2020-10-08 PROBLEM — R07.9 CHEST PAIN: Status: ACTIVE | Noted: 2020-10-08

## 2020-10-08 LAB
ANION GAP SERPL CALC-SCNC: 3 MMOL/L
BUN SERPL-MCNC: 15 MG/DL
BUN/CREAT SERPL: 16
CALCIUM SERPL-MCNC: 9.2 MG/DL
CHLORIDE SERPL-SCNC: 104 MMOL/L
CO2 SERPL-SCNC: 34 MMOL/L
CREAT SERPL-MCNC: 0.94 MG/DL
ERYTHROCYTE [DISTWIDTH] IN BLOOD BY AUTOMATED COUNT: 41.4 %
ERYTHROCYTE [DISTWIDTH] IN BLOOD: 12.1 %
GLUCOSE SERPL-MCNC: 93 MG/DL
HCT VFR BLD CALC: 34 %
HGB BLD-MCNC: 11.6 G/DL
MCH RBC QN AUTO: 31.9 PG
MCHC RBC AUTO-ENTMCNC: 34.1 G/DL
MCV RBC AUTO: 93.4 FL
PLATELET # BLD: 199 K/MCL
POTASSIUM SERPL-SCNC: 3.5 MMOL/L
RBC # BLD: 3.64 10*6/UL
SODIUM SERPL-SCNC: 141 MMOL/L
WBC # BLD: 4.5 K/MCL

## 2020-10-08 PROCEDURE — 80048 BASIC METABOLIC PNL TOTAL CA: CPT | Performed by: EMERGENCY MEDICINE

## 2020-10-08 PROCEDURE — 3008F BODY MASS INDEX DOCD: CPT | Performed by: INTERNAL MEDICINE

## 2020-10-08 PROCEDURE — 99214 OFFICE O/P EST MOD 30 MIN: CPT | Performed by: INTERNAL MEDICINE

## 2020-10-08 PROCEDURE — 93000 ELECTROCARDIOGRAM COMPLETE: CPT | Performed by: INTERNAL MEDICINE

## 2020-10-08 PROCEDURE — 82962 GLUCOSE BLOOD TEST: CPT | Performed by: INTERNAL MEDICINE

## 2020-10-08 PROCEDURE — 80061 LIPID PANEL: CPT | Performed by: EMERGENCY MEDICINE

## 2020-10-08 PROCEDURE — 84484 ASSAY OF TROPONIN QUANT: CPT | Performed by: EMERGENCY MEDICINE

## 2020-10-08 PROCEDURE — 36416 COLLJ CAPILLARY BLOOD SPEC: CPT | Performed by: INTERNAL MEDICINE

## 2020-10-08 PROCEDURE — 99285 EMERGENCY DEPT VISIT HI MDM: CPT

## 2020-10-08 PROCEDURE — 93010 ELECTROCARDIOGRAM REPORT: CPT | Performed by: EMERGENCY MEDICINE

## 2020-10-08 PROCEDURE — 3074F SYST BP LT 130 MM HG: CPT | Performed by: INTERNAL MEDICINE

## 2020-10-08 PROCEDURE — 85379 FIBRIN DEGRADATION QUANT: CPT | Performed by: EMERGENCY MEDICINE

## 2020-10-08 PROCEDURE — 93971 EXTREMITY STUDY: CPT | Performed by: EMERGENCY MEDICINE

## 2020-10-08 PROCEDURE — 3079F DIAST BP 80-89 MM HG: CPT | Performed by: INTERNAL MEDICINE

## 2020-10-08 PROCEDURE — 36415 COLL VENOUS BLD VENIPUNCTURE: CPT

## 2020-10-08 PROCEDURE — 71045 X-RAY EXAM CHEST 1 VIEW: CPT | Performed by: EMERGENCY MEDICINE

## 2020-10-08 PROCEDURE — 93005 ELECTROCARDIOGRAM TRACING: CPT

## 2020-10-08 PROCEDURE — 85025 COMPLETE CBC W/AUTO DIFF WBC: CPT | Performed by: EMERGENCY MEDICINE

## 2020-10-08 NOTE — TELEPHONE ENCOUNTER
----- Message from Juliet Renee MD sent at 10/8/2020  8:42 AM CDT -----  Please call Raúl Jackman today , she had an abnormal EKG at [facility] yesterday and didn't want to be admitted there.

## 2020-10-08 NOTE — TELEPHONE ENCOUNTER
Patient called back and is at home today. Patient had felt dizzy and was taken to ER at [facility], abnormal EKG, refused admission. She states she had breakfast at 6:30am and blood sugar was 63 at 11:30am, is concerned.   She wanted to be seen today and rep

## 2020-10-08 NOTE — ASSESSMENT & PLAN NOTE
2 episodes yesterday , works at [employer] , [facility] called on her, she was taken to the ER , had an EKG which was abnormal , they wanted her  to be admitted there , she refused. EKG here shows non specific St-T wave changes.   V4 through V6, ekg pr

## 2020-10-08 NOTE — PROGRESS NOTES
HPI:    Patient ID: Sol Hitchcock is a 62year old female. 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 She was light headed 2 to 3 times, once with change in position .  bp was 127/73 , glucose was low and she had AFFECTED AREA     • atorvastatin 20 MG Oral Tab Take 1 tablet (20 mg total) by mouth once daily.  90 tablet 3   • ergocalciferol 1.25 MG (47246 UT) Oral Cap TAKE 1 CAPSULE BY MOUTH EVERY WEEK 12 capsule 3   • folic acid 1 MG Oral Tab Take 1 tablet (1 mg tot 0.3 mL (1 each total) into the skin daily as needed.  2 each 2   • hydrocortisone 2.5 % External Cream APPLY BID 28 g 2   • Hydrocortisone Valerate (WESTCORT) 0.2 % Apply Externally Cream   0   • Glucose Blood (RA TRUETEST TEST) In Vitro Strip take by Intra light. EOM are normal.   Neck: Normal range of motion. No thyromegaly present. Cardiovascular: Normal rate, regular rhythm, normal heart sounds and intact distal pulses. Exam reveals no gallop. No murmur heard.   Pulmonary/Chest: Effort normal and breat

## 2020-10-09 ENCOUNTER — APPOINTMENT (OUTPATIENT)
Dept: CT IMAGING | Facility: HOSPITAL | Age: 60
DRG: 948 | End: 2020-10-09
Attending: HOSPITALIST
Payer: COMMERCIAL

## 2020-10-09 ENCOUNTER — APPOINTMENT (OUTPATIENT)
Dept: CV DIAGNOSTICS | Facility: HOSPITAL | Age: 60
DRG: 948 | End: 2020-10-09
Attending: HOSPITALIST
Payer: COMMERCIAL

## 2020-10-09 VITALS
TEMPERATURE: 98 F | HEIGHT: 64 IN | WEIGHT: 176.19 LBS | RESPIRATION RATE: 16 BRPM | DIASTOLIC BLOOD PRESSURE: 75 MMHG | OXYGEN SATURATION: 99 % | HEART RATE: 70 BPM | SYSTOLIC BLOOD PRESSURE: 114 MMHG | BODY MASS INDEX: 30.08 KG/M2

## 2020-10-09 PROBLEM — R77.8 ELEVATED TROPONIN: Status: ACTIVE | Noted: 2020-10-09

## 2020-10-09 PROBLEM — R79.89 ELEVATED TROPONIN: Status: ACTIVE | Noted: 2020-10-09

## 2020-10-09 PROBLEM — R42 LIGHTHEADEDNESS: Status: ACTIVE | Noted: 2020-10-09

## 2020-10-09 LAB
ALBUMIN SERPL-MCNC: 3.7 G/DL
ALBUMIN/GLOB SERPL: 1.2 {RATIO}
ALP SERPL-CCNC: 60 U/L
ALT SERPL-CCNC: 34 UNITS/L
ANION GAP SERPL CALC-SCNC: 5 MMOL/L
AST SERPL-CCNC: 26 UNITS/L
BILIRUB SERPL-MCNC: 0.4 MG/DL
BUN SERPL-MCNC: 13 MG/DL
BUN/CREAT SERPL: 15.1
CALCIUM SERPL-MCNC: 9 MG/DL
CHLORIDE SERPL-SCNC: 104 MMOL/L
CO2 SERPL-SCNC: 32 MMOL/L
CREAT SERPL-MCNC: 0.86 MG/DL
ESTIMATED AVERAGE GLUCOSE: 103
GLOBULIN SER-MCNC: 3.1 G/DL
GLUCOSE SERPL-MCNC: 102 MG/DL
HBA1C MFR BLD: 5.2 %
POTASSIUM SERPL-SCNC: 3.1 MMOL/L
PROT SERPL-MCNC: 6.8 G/DL
SODIUM SERPL-SCNC: 141 MMOL/L
TSH SERPL-ACNC: 2.19 MCUNITS/ML

## 2020-10-09 PROCEDURE — 93005 ELECTROCARDIOGRAM TRACING: CPT

## 2020-10-09 PROCEDURE — 93306 TTE W/DOPPLER COMPLETE: CPT | Performed by: HOSPITALIST

## 2020-10-09 PROCEDURE — 82962 GLUCOSE BLOOD TEST: CPT

## 2020-10-09 PROCEDURE — 97116 GAIT TRAINING THERAPY: CPT

## 2020-10-09 PROCEDURE — 70450 CT HEAD/BRAIN W/O DYE: CPT | Performed by: HOSPITALIST

## 2020-10-09 PROCEDURE — 83036 HEMOGLOBIN GLYCOSYLATED A1C: CPT | Performed by: HOSPITALIST

## 2020-10-09 PROCEDURE — 84443 ASSAY THYROID STIM HORMONE: CPT | Performed by: HOSPITALIST

## 2020-10-09 PROCEDURE — 84484 ASSAY OF TROPONIN QUANT: CPT | Performed by: EMERGENCY MEDICINE

## 2020-10-09 PROCEDURE — 93010 ELECTROCARDIOGRAM REPORT: CPT | Performed by: EMERGENCY MEDICINE

## 2020-10-09 PROCEDURE — 93010 ELECTROCARDIOGRAM REPORT: CPT | Performed by: HOSPITALIST

## 2020-10-09 PROCEDURE — 84484 ASSAY OF TROPONIN QUANT: CPT | Performed by: INTERNAL MEDICINE

## 2020-10-09 PROCEDURE — 80053 COMPREHEN METABOLIC PANEL: CPT | Performed by: HOSPITALIST

## 2020-10-09 PROCEDURE — 83735 ASSAY OF MAGNESIUM: CPT | Performed by: HOSPITALIST

## 2020-10-09 PROCEDURE — 97530 THERAPEUTIC ACTIVITIES: CPT

## 2020-10-09 PROCEDURE — 97162 PT EVAL MOD COMPLEX 30 MIN: CPT

## 2020-10-09 RX ORDER — AMLODIPINE BESYLATE 10 MG/1
5 TABLET ORAL DAILY
COMMUNITY
End: 2020-12-07 | Stop reason: SDUPTHER

## 2020-10-09 RX ORDER — HYDROCHLOROTHIAZIDE 12.5 MG/1
12.5 CAPSULE, GELATIN COATED ORAL
Status: DISCONTINUED | OUTPATIENT
Start: 2020-10-09 | End: 2020-10-09

## 2020-10-09 RX ORDER — FEXOFENADINE HCL 180 MG/1
180 TABLET ORAL DAILY PRN
COMMUNITY

## 2020-10-09 RX ORDER — HYDROXYCHLOROQUINE SULFATE 200 MG/1
TABLET, FILM COATED ORAL 2 TIMES DAILY
COMMUNITY
Start: 2020-03-17

## 2020-10-09 RX ORDER — MECLIZINE HYDROCHLORIDE 25 MG/1
25 TABLET ORAL 3 TIMES DAILY PRN
Qty: 30 TABLET | Refills: 0 | Status: SHIPPED | OUTPATIENT
Start: 2020-10-09 | End: 2021-09-28

## 2020-10-09 RX ORDER — HYDROXYZINE HYDROCHLORIDE 10 MG/1
10 TABLET, FILM COATED ORAL EVERY 4 HOURS PRN
Status: DISCONTINUED | OUTPATIENT
Start: 2020-10-09 | End: 2020-10-09

## 2020-10-09 RX ORDER — LISINOPRIL 20 MG/1
20 TABLET ORAL DAILY
COMMUNITY
Start: 2020-03-17

## 2020-10-09 RX ORDER — POTASSIUM CHLORIDE 20 MEQ/1
40 TABLET, EXTENDED RELEASE ORAL EVERY 4 HOURS
Status: DISCONTINUED | OUTPATIENT
Start: 2020-10-09 | End: 2020-10-09

## 2020-10-09 RX ORDER — SODIUM CHLORIDE 0.9 % (FLUSH) 0.9 %
3 SYRINGE (ML) INJECTION AS NEEDED
Status: DISCONTINUED | OUTPATIENT
Start: 2020-10-09 | End: 2020-10-09

## 2020-10-09 RX ORDER — MECLIZINE HYDROCHLORIDE 25 MG/1
25 TABLET ORAL ONCE
Status: COMPLETED | OUTPATIENT
Start: 2020-10-09 | End: 2020-10-09

## 2020-10-09 RX ORDER — GLUCOSAMINE HCL/CHONDROITIN SU 500-400 MG
CAPSULE ORAL 3 TIMES DAILY
COMMUNITY
Start: 2012-08-28

## 2020-10-09 RX ORDER — METHOTREXATE 2.5 MG/1
TABLET ORAL
COMMUNITY
Start: 2020-03-17

## 2020-10-09 RX ORDER — NITROGLYCERIN 0.4 MG/1
0.4 TABLET SUBLINGUAL EVERY 5 MIN PRN
Status: DISCONTINUED | OUTPATIENT
Start: 2020-10-09 | End: 2020-10-09

## 2020-10-09 RX ORDER — CYCLOBENZAPRINE HCL 10 MG
10 TABLET ORAL 3 TIMES DAILY
Refills: 1 | Status: DISCONTINUED | OUTPATIENT
Start: 2020-10-09 | End: 2020-10-09

## 2020-10-09 RX ORDER — MAGNESIUM OXIDE 400 MG (241.3 MG MAGNESIUM) TABLET
400 TABLET ONCE
Status: COMPLETED | OUTPATIENT
Start: 2020-10-09 | End: 2020-10-09

## 2020-10-09 RX ORDER — OLOPATADINE HYDROCHLORIDE 1 MG/ML
SOLUTION/ DROPS OPHTHALMIC 2 TIMES DAILY PRN
COMMUNITY
Start: 2004-04-26

## 2020-10-09 RX ORDER — FAMOTIDINE 20 MG/1
20 TABLET ORAL 2 TIMES DAILY
Status: DISCONTINUED | OUTPATIENT
Start: 2020-10-09 | End: 2020-10-09

## 2020-10-09 RX ORDER — HYDROXYZINE HCL 10 MG/5 ML
SOLUTION, ORAL ORAL
COMMUNITY
Start: 2020-09-23

## 2020-10-09 RX ORDER — MECLIZINE HYDROCHLORIDE 25 MG/1
25 TABLET ORAL DAILY PRN
COMMUNITY
Start: 2020-10-09

## 2020-10-09 RX ORDER — BETAMETHASONE DIPROPIONATE 0.5 MG/G
OINTMENT TOPICAL 2 TIMES DAILY
COMMUNITY
Start: 2018-12-10

## 2020-10-09 RX ORDER — TRIAMCINOLONE ACETONIDE 5 MG/G
1 OINTMENT TOPICAL 2 TIMES DAILY PRN
Status: DISCONTINUED | OUTPATIENT
Start: 2020-10-09 | End: 2020-10-09

## 2020-10-09 RX ORDER — CARISOPRODOL 350 MG/1
350 TABLET ORAL 2 TIMES DAILY PRN
COMMUNITY
Start: 2020-10-09

## 2020-10-09 RX ORDER — CARISOPRODOL 350 MG/1
350 TABLET ORAL 2 TIMES DAILY PRN
Qty: 60 TABLET | Refills: 1 | Status: SHIPPED | OUTPATIENT
Start: 2020-10-09 | End: 2021-01-18

## 2020-10-09 RX ORDER — EPINEPHRINE 0.3 MG/.3ML
1 INJECTION SUBCUTANEOUS DAILY PRN
COMMUNITY
Start: 2020-06-23

## 2020-10-09 RX ORDER — ATORVASTATIN CALCIUM 20 MG/1
20 TABLET, FILM COATED ORAL DAILY
Status: DISCONTINUED | OUTPATIENT
Start: 2020-10-09 | End: 2020-10-09

## 2020-10-09 RX ORDER — ERGOCALCIFEROL 1.25 MG/1
50000 CAPSULE ORAL
Status: DISCONTINUED | OUTPATIENT
Start: 2020-10-12 | End: 2020-10-09

## 2020-10-09 RX ORDER — ERGOCALCIFEROL 1.25 MG/1
1 CAPSULE ORAL
COMMUNITY
Start: 2020-03-17

## 2020-10-09 RX ORDER — DIPHENHYDRAMINE HCL 25 MG
25 CAPSULE ORAL 3 TIMES DAILY PRN
COMMUNITY

## 2020-10-09 RX ORDER — METFORMIN HYDROCHLORIDE 500 MG/1
500 TABLET, EXTENDED RELEASE ORAL 2 TIMES DAILY
COMMUNITY
Start: 2020-03-17

## 2020-10-09 RX ORDER — DEXTROSE MONOHYDRATE 25 G/50ML
50 INJECTION, SOLUTION INTRAVENOUS
Status: DISCONTINUED | OUTPATIENT
Start: 2020-10-09 | End: 2020-10-09

## 2020-10-09 RX ORDER — ONDANSETRON 2 MG/ML
4 INJECTION INTRAMUSCULAR; INTRAVENOUS EVERY 6 HOURS PRN
Status: DISCONTINUED | OUTPATIENT
Start: 2020-10-09 | End: 2020-10-09

## 2020-10-09 RX ORDER — ASPIRIN 81 MG/1
81 TABLET, CHEWABLE ORAL DAILY
Status: DISCONTINUED | OUTPATIENT
Start: 2020-10-09 | End: 2020-10-09

## 2020-10-09 RX ORDER — ATORVASTATIN CALCIUM 20 MG/1
20 TABLET, FILM COATED ORAL DAILY
COMMUNITY
Start: 2020-03-17

## 2020-10-09 RX ORDER — MECLIZINE HYDROCHLORIDE 25 MG/1
25 TABLET ORAL 3 TIMES DAILY PRN
Status: DISCONTINUED | OUTPATIENT
Start: 2020-10-09 | End: 2020-10-09

## 2020-10-09 RX ORDER — FOLIC ACID 1 MG/1
1 TABLET ORAL
Status: DISCONTINUED | OUTPATIENT
Start: 2020-10-09 | End: 2020-10-09

## 2020-10-09 RX ORDER — CETIRIZINE HYDROCHLORIDE 10 MG/1
10 TABLET ORAL DAILY PRN
Status: DISCONTINUED | OUTPATIENT
Start: 2020-10-09 | End: 2020-10-09

## 2020-10-09 RX ORDER — FOLIC ACID 1 MG/1
1 TABLET ORAL DAILY
COMMUNITY
Start: 2020-03-17

## 2020-10-09 RX ORDER — HEPARIN SODIUM 5000 [USP'U]/ML
5000 INJECTION, SOLUTION INTRAVENOUS; SUBCUTANEOUS EVERY 12 HOURS SCHEDULED
Status: DISCONTINUED | OUTPATIENT
Start: 2020-10-09 | End: 2020-10-09

## 2020-10-09 RX ORDER — HYDROCHLOROTHIAZIDE 25 MG/1
12.5 TABLET ORAL DAILY
COMMUNITY

## 2020-10-09 NOTE — ED INITIAL ASSESSMENT (HPI)
Pt states she was hypotensive yesterday and was seen at another er. Pt states they found changes to her ekg. Refused admission, seen at pcp office who instructed her to go to er. Pt denies chest pain, denies SHAHID.

## 2020-10-09 NOTE — CM/SW NOTE
Received call from [address] [place] [address] ([phone]) w/ [facility] that they service pt for her intrafecal pump. Per Ulises Burrell, pt receives hydromorphone through her pump and they are trying to schedule a refill w/ pt.     RAMONA informed Ulises Burrell of an

## 2020-10-09 NOTE — PAYOR COMM NOTE
--------------  ADMISSION REVIEW     Payor: [employer] [facility]  Subscriber #:  [id number]  Authorization Number: [id number]    Admit date: 10/9/20  Admit time: [address] 4037       Admitting Physician: Scar Ingram MD  Attending Physician:  MD IAIN Worrell Past Surgical History:   Procedure Laterality Date   • APPENDECTOMY     • BOWEL RESECTION     • LYSIS OF ADHESIONS  1999   • OTHER SURGICAL HISTORY      Tibia fracture repair   • OTHER SURGICAL HISTORY      Excision lipoma on back   • OTHER SURGIC All other components within normal limits   TROPONIN I - Abnormal; Notable for the following components:    Troponin 0.176 (*)     All other components within normal limits   LIPID PANEL - Abnormal; Notable for the following components:    HDL Cholesterol Admission disposition: 10/9/2020 12:07 AM                   Disposition and Plan     Clinical Impression:  Elevated troponin  (primary encounter diagnosis)  Lightheadedness    Disposition:  Admit  10/9/2020 12:07 am    Follow-up:  No follow-up provider spe Date Action Dose Route User    10/9/2020 2534 Given 40 mEq Oral Nidia Chatman, RN        Lizy Mckinley MD   Physician   Specialty:  CARDIOLOGY   Consults   Signed   Date of Service:  10/8/2020 11:47 PM            Consult Orders   ED Consult to Cardiol • APPENDECTOMY       • BOWEL RESECTION       • LYSIS OF ADHESIONS   1999   • OTHER SURGICAL HISTORY         Tibia fracture repair   • OTHER SURGICAL HISTORY         Excision lipoma on back   • OTHER SURGICAL HISTORY         Ovarian cyst removed   • TOTAL A Physical Exam:  Blood pressure 130/77, pulse 79, temperature 98.4 °F (36.9 °C), temperature source Oral, resp. rate 18, weight 175 lb (79.4 kg), SpO2 97 %.   Temp (24hrs), Av.1 °F (36.7 °C), Min:97.8 °F (36.6 °C), Max:98.4 °F (36.9 °C)     Wt Readings f Abnormal EKG     Hypoglycemia     Chest pain        Intermittent lightheadedness yesterday - now resolved.  No clinical suggestion of sustained arrhythmia.     Isolated troponin elevation with no clinical suggestion of cardiac instability     Has been ad

## 2020-10-09 NOTE — PROGRESS NOTES
[facility] HOSP - [facility]    Cardiology Progress Note  [facility]    Randeen Moritz Patient Status:  Inpatient    1960 MRN [id number]   Location [facility] 3W/SW Attending Gris Schuster MD   Hosp Day # 0 PCP Richardson Rivera 12.5 mg Oral Daily   • famotidine  20 mg Oral BID   • Potassium Chloride ER  40 mEq Oral Q4H   • Insulin Aspart Pen  1-5 Units Subcutaneous TID CC       Continuous Infusions:     Results:     Lab Results   Component Value Date    WBC 4.5 10/08/2020    HGB Finalized by (CST):  Eduardo Singh MD on 10/09/2020 at 8:59 AM          Ekg 12-lead    Result Date: 10/9/2020  ECG Report  Interpretation  --------------------------     Ekg 12-lead    Result Date: 10/9/2020  ECG Report  Interpretation  ---------------

## 2020-10-09 NOTE — PAYOR COMM NOTE
--------------  CONTINUED STAY REVIEW    Payor: [facility]  Subscriber #:  [id number]  Authorization Number: [id number]    Admit date: 10/9/20  Admit time: [address] 4037    Admitting Physician: Gricel Chen MD  Attending Physician:  Leonarda Anaya MD Cardiology Progress Note  [facility]           July Lamas Patient Status:  Inpatient    1960 MRN [id number]   Location [facility] 3W/SW Attending Desi Padilla MD   Hosp Day # 0 PCP Noelle Montoya MD           • [START ON 10/12/2020] ergocalciferol  50,000 Units Oral Q7 Days   • folic acid  1 mg Oral Daily   • hydrochlorothiazide  12.5 mg Oral Daily   • famotidine  20 mg Oral BID   • Potassium Chloride ER  40 mEq Oral Q4H   • Insulin Aspart Pen  1-5 Units Subcut CONCLUSION:  1. Little change from March 17 , 2017. 2. Borderline cardiomegaly. 3. Tortuous aorta. 4. Demineralization. 5. Scoliosis. 6. Osteoarthritis. 7. A preliminary report was submitted and there is agreement without major discrepancies.     Dictated b PLEASE FAX DAYS CERTIFIED AND NEXT REVIEW DATE

## 2020-10-09 NOTE — CONSULTS
[facility] HOSP - [facility]  Report of Consultation    Albert Mota Patient Status:  Inpatient    1960 MRN [id number]   Location [facility] 3W/SW Attending Miranda Ji MD   Hosp Day # 0 PCP Mario Apodaca MD     Date of Admission:  10/ Comment:Other reaction(s): ASPIRIN             Other reaction(s): ASPIRIN  Caffeine                    Comment:Other reaction(s): CAFFEINE  Clindamycin                 Comment:Other reaction(s): CLINDAMYCIN  Codeine                     Comment:Other re Human (NOVOLIN R) 100 UNIT/ML injection 1-5 Units, 1-5 Units, Subcutaneous, Q6H VINCENZO  •  nitroGLYCERIN (NITRO-BID 2 % TD OINT) 2 % ointment 0.5 inch, 0.5 inch, Topical, Once  •  atorvastatin (LIPITOR) tab 20 mg, 20 mg, Oral, Daily  •  triamcinolone acetonid CREATSERUM 0.86 10/09/2020    BUN 13 10/09/2020     10/09/2020    K 3.1 10/09/2020     10/09/2020    CO2 32.0 10/09/2020     10/09/2020    CA 9.0 10/09/2020    ALB 3.7 10/09/2020    ALKPHO 60 10/09/2020    BILT 0.4 10/09/2020    TP 6. 381.6mcg/day   Patient has a bolus of 35mgc every 4 hours if needed at 4/day max   Discussed plan of care with bedside RN     Thank you for allowing me to participate in the care of your patient. Comprehensive analgesic plan was formulated.  Parish Crook

## 2020-10-09 NOTE — CONSULTS
[facility] HOSP - [facility]    Report of Consultation    [facility] Patient Status:  Emergency    1960 MRN [id number]   Location [address] Attending Minor Banegas MD   Hosp Day # 0 PCP Madi Arteaga MD     Date reports that she does not drink alcohol.     Allergies:    Acetaminophen               Comment:Other reaction(s): ACETAMINOPHEN  Adhesive Tape (Leonela*        Comment:Other reaction(s): TAPE, OCCLUSIVE ADHESIVE             Other reaction(s): BANDAGES, LIGHT-W completed    General: Alert and oriented in no apparent distress. HEENT: No focal deficits. Neck: No JVD  Cardiac: Regular rate and rhythm  Lungs: Clear without wheezes, rales, rhonchi or dullness. Normal excursions and effort.   Abdomen: Soft, non-tende outpatient evaluation with stress nuclear testing or CTA of the coronary circulation could be considered    Reviewed presentation and ER course with Dr. Martínez Manual  10/8/2020  11:48 PM

## 2020-10-09 NOTE — H&P
[facility]    PATIENT'S NAME: [email or web address]   ATTENDING PHYSICIAN: Pako Gutierrez MD   PATIENT ACCOUNT#:   [id number]    LOCATION:  [address] [place] [address] [address] #:   [id number]       YOB: 1960  ADMISSION DATE:       10/08/202 evaluated by Cardiology; the cardiologist recommended an echocardiogram.  If no regional wall motion abnormalities were found, ejection fraction was normal, etc., an outpatient stress nuclear testing or CTA of the coronaries could be considered.   The karla topically twice a day as needed, hydroxychloroquine 200 mg twice a day, hydroxyzine 10 mg 3 times a day, lisinopril 20 mg daily, metformin 500 mg twice a day, methotrexate 2.5 mg tablets 8 tablets every week, Xolair 150 mg subcutaneous every 4 weeks, _____ There was no guarding or rebound. EXTREMITIES:  No clubbing, cyanosis. There was left lower extremity edema. No calf tenderness. SKIN:  Warm and dry. Although the patient was itchy, no hives were present.   NEUROLOGIC:  She is awake, alert, oriented x present to [facility] for replenishment of her hydromorphone. 4.   Rheumatoid arthritis. Continue her usual immune medication. 5.   Allergies. The patient has had chronic idiopathic urticaria for years, she states at least 7 years.   Has followed up with an a

## 2020-10-09 NOTE — PROGRESS NOTES
Betamethasone Dipropionate Aug (DIPROLENE-AF) 0.05 % ointment is Non-Formulary Medication &  Auto-Substituted to Triamcinolone 0.5% ointment Per P&T PROTOCOL

## 2020-10-09 NOTE — PROGRESS NOTES
Remains dizzy this afternoon  Asked RN to check orthostatics and she became very dizzy when standing. Was unable to complete measurement and had patient sit back down safely. Admits to vertiginous symptoms.   I have asked PT to see for vestibular therapy

## 2020-10-09 NOTE — PROGRESS NOTES
ADMISSION NOTE    62year old female with h/o RA with intrathecal pump presents with diziness and elevated troponin. . Available medical records partially reviewed. Dictation to follow.     Carlos Watts M.D.  10/9/2020

## 2020-10-09 NOTE — ED NOTES
Orders for admission, patient is aware of plan and ready to go upstairs. Any questions, please call ED RN [facility]  at extension [phone].    Type of COVID test sent:Rapid  COVID Suspicion level: Low    Titratable drug(s) infusing:none  Rate:n/a    LOC at time of

## 2020-10-09 NOTE — ED PROVIDER NOTES
Patient Seen in: [facility] AND [facility] Emergency Department      History   Patient presents with:  Abnormal Result    Stated Complaint: abnormal results    HPI    23-year-old female with history of hypertension, diabetes, and rheumatoid arthritis presents wi HPI.  Constitutional and vital signs reviewed. All other systems reviewed and negative except as noted above.     Physical Exam     ED Triage Vitals [10/08/20 2112]   /86   Pulse 83   Resp 18   Temp 98.4 °F (36.9 °C)   Temp src Oral   SpO2 100 % PLATELET    Narrative: The following orders were created for panel order CBC WITH DIFFERENTIAL WITH PLATELET.   Procedure                               Abnormality         Status                     ---------                               -----------

## 2020-10-09 NOTE — PLAN OF CARE
Patient threatening to leave AMA related to Dr. Maximiliano Tate ordering benadryl every 6 hours PRN instead of 3 hours PRN (which is the patients stated frequency), and this writer explaining to the patient that the bottle of benadryl at the patient's bedside wo

## 2020-10-09 NOTE — PHYSICAL THERAPY NOTE
PHYSICAL THERAPY EVALUATION - INPATIENT     Room Number: 320/320-A  Evaluation Date: 10/9/2020  Type of Evaluation: Initial   Physician Order: PT Eval and Treat(for vestibular therapy   please see today for discharge)    Presenting Problem: dizziness  Ltanya Hopelizabeth peripheral vertigo. Pt educated on recommendation to f/u with ENT and OP Vestibular PT to further assess dizziness and treat as appropriate. Handout provided with contact information to [facility] clinic.  Pt instructed to use rolling walker at all t diabetes mellitus without mention of complication, not stated as uncontrolled    • Unspecified essential hypertension    • Vertigo      Past Surgical History  Past Surgical History:   Procedure Laterality Date   • APPENDECTOMY     • BOWEL RESECTION     • L Hallpike- negative bilaterally for nystagmus; pt with onset high dizziness and R ear pain with R Breeding-Hallpike    NEUROLOGICAL FINDINGS  Coordination- in tact BUE/BLE with functional mobility  Speech- in tact, appropriate responses to questions    ACTIVITY Provided:  Bed mobility  Body mechanics  Energy conservation  Functional activity tolerated  Gait training  Transfer training  stair training, home and activity modifications    Patient End of Session: Up in chair;Needs met;Call light within reach;RN aware

## 2020-10-09 NOTE — ED NOTES
Patient reporting \"room spinning dizziness\". MD notified. ERT and RN at bedside for repeat ekg and repeat troponin.  Patient denies chest pain or shortness of breath

## 2020-10-09 NOTE — PLAN OF CARE
Received patient awake and alert. Patient c/o itching throughout the night. Patient threatened to leave AMA, after speaking with MD patient agreed to stay. Medication administered for itching.      Problem: Patient Centered Care  Goal: Patient preferences a ordered by the doctor  - See additional Care Plan goals for specific interventions  10/9/2020 0626 by Claudia Cha RN  Outcome: Progressing  10/9/2020 0623 by Claudia Cha RN  Outcome: Progressing

## 2020-10-12 ENCOUNTER — PATIENT OUTREACH (OUTPATIENT)
Dept: CASE MANAGEMENT | Age: 60
End: 2020-10-12

## 2020-10-12 DIAGNOSIS — Z02.9 ENCOUNTERS FOR ADMINISTRATIVE PURPOSE: ICD-10-CM

## 2020-10-12 NOTE — PAYOR COMM NOTE
--------------  DISCHARGE REVIEW    Payor: Jeannette DILL #:  [id number]  Authorization Number: [id number]    Discharge Date: 10/9/2020  7:27 PM     Admitting Physician: Jen Fan MD  Attending Physician:  No att. providers found  [facility]

## 2020-10-12 NOTE — PROGRESS NOTES
Initial Post Discharge Follow Up   Discharge Date: 10/9/20  Contact Date: 10/12/2020    Consent Verification:  Assessment Completed With: Patient  HIPAA Verified?   Yes    Discharge Dx:  Elevated troponin      General:   • How have you been since your [facility] 1 CAPSULE BY MOUTH EVERY WEEK 12 capsule 3   • folic acid 1 MG Oral Tab Take 1 tablet (1 mg total) by mouth once daily.  90 tablet 3   • Hydroxychloroquine Sulfate 200 MG Oral Tab TAKE 1 TABLET(200 MG) BY MOUTH TWICE DAILY 180 tablet 3   • lisinopril 20 MG about your new medication?  No  • Did you  your discharge medications when you left the hospital? Yes  • May I go over your medications with you to make sure we are not missing anything?no, pt states that she is at work and does not have her list. or worsening symptoms. She states that she checks her blood sugar on Wed and Saturdays and therefor did not check it today. She declined med review stating that she is at work and does not have her list with her. She works as a nurse.  MARY scheduled HFU wit

## 2020-10-13 ENCOUNTER — OFFICE VISIT (OUTPATIENT)
Dept: CARDIOLOGY | Age: 60
End: 2020-10-13

## 2020-10-13 VITALS
WEIGHT: 178 LBS | OXYGEN SATURATION: 100 % | DIASTOLIC BLOOD PRESSURE: 80 MMHG | BODY MASS INDEX: 30.39 KG/M2 | HEART RATE: 81 BPM | HEIGHT: 64 IN | SYSTOLIC BLOOD PRESSURE: 124 MMHG

## 2020-10-13 DIAGNOSIS — R79.89 ELEVATED TROPONIN: ICD-10-CM

## 2020-10-13 DIAGNOSIS — R94.31 ABNORMAL ELECTROCARDIOGRAM (ECG) (EKG): Primary | ICD-10-CM

## 2020-10-13 PROCEDURE — 99214 OFFICE O/P EST MOD 30 MIN: CPT | Performed by: NURSE PRACTITIONER

## 2020-10-13 RX ORDER — REGADENOSON 0.08 MG/ML
0.4 INJECTION, SOLUTION INTRAVENOUS ONCE
Status: DISCONTINUED | OUTPATIENT
Start: 2020-10-13 | End: 2020-10-13

## 2020-10-13 SDOH — HEALTH STABILITY: MENTAL HEALTH: HOW OFTEN DO YOU HAVE A DRINK CONTAINING ALCOHOL?: NEVER

## 2020-10-13 ASSESSMENT — PATIENT HEALTH QUESTIONNAIRE - PHQ9
SUM OF ALL RESPONSES TO PHQ9 QUESTIONS 1 AND 2: 0
2. FEELING DOWN, DEPRESSED OR HOPELESS: NOT AT ALL
SUM OF ALL RESPONSES TO PHQ9 QUESTIONS 1 AND 2: 0
CLINICAL INTERPRETATION OF PHQ2 SCORE: NO FURTHER SCREENING NEEDED
1. LITTLE INTEREST OR PLEASURE IN DOING THINGS: NOT AT ALL
CLINICAL INTERPRETATION OF PHQ9 SCORE: NO FURTHER SCREENING NEEDED

## 2020-10-15 ENCOUNTER — OFFICE VISIT (OUTPATIENT)
Dept: INTERNAL MEDICINE CLINIC | Facility: CLINIC | Age: 60
End: 2020-10-15
Payer: COMMERCIAL

## 2020-10-15 VITALS
WEIGHT: 179 LBS | BODY MASS INDEX: 30.56 KG/M2 | DIASTOLIC BLOOD PRESSURE: 78 MMHG | HEIGHT: 64 IN | TEMPERATURE: 97 F | HEART RATE: 72 BPM | SYSTOLIC BLOOD PRESSURE: 122 MMHG

## 2020-10-15 DIAGNOSIS — E11.9 DIABETES MELLITUS WITHOUT COMPLICATION (HCC): ICD-10-CM

## 2020-10-15 DIAGNOSIS — I10 ESSENTIAL HYPERTENSION, BENIGN: ICD-10-CM

## 2020-10-15 DIAGNOSIS — R42 LIGHT HEADEDNESS: Primary | ICD-10-CM

## 2020-10-15 PROCEDURE — 3074F SYST BP LT 130 MM HG: CPT | Performed by: INTERNAL MEDICINE

## 2020-10-15 PROCEDURE — 99495 TRANSJ CARE MGMT MOD F2F 14D: CPT | Performed by: INTERNAL MEDICINE

## 2020-10-15 PROCEDURE — 3078F DIAST BP <80 MM HG: CPT | Performed by: INTERNAL MEDICINE

## 2020-10-15 PROCEDURE — 3008F BODY MASS INDEX DOCD: CPT | Performed by: INTERNAL MEDICINE

## 2020-10-15 RX ORDER — HYDROCHLOROTHIAZIDE 25 MG/1
12.5 TABLET ORAL DAILY
COMMUNITY
End: 2021-01-18 | Stop reason: DRUGHIGH

## 2020-10-15 NOTE — ASSESSMENT & PLAN NOTE
Tried stopping HCTZ but felt like she was retaining fluid. Will stop amlodipine, check bp daily , call if greater than 135/85.

## 2020-10-15 NOTE — PROGRESS NOTES
HPI:    Liza Singleton is a 62year old female here today for hospital follow up.    She was discharged from Inpatient hospital, [facility] AND [facility]  to Home   Admission Date: 10/8/20   Discharge Date: 10/9/20  Hospital Discharge Diagnoses (since 9/15/2020) period. She initially thought maybe it was because she got up too fast, but when it persisted her coworkers insisted she go to the emergency room for evaluation.   Apparently, her EKG in the emergency was abnormal, and the physicians there recommended admi tramadol hcl. Current Meds:    •  CARISOPRODOL 350 MG Oral Tab, TAKE 1 TABLET (350 MG TOTAL) BY MOUTH 2 (TWO) TIMES DAILY AS NEEDED FOR MUSCLE SPASMS.     •  hydrOXYzine HCl 10 MG/5ML Oral Syrup, TAKE 5 ML BY MOUTH THREE TIMES DAILY AS NEEDED FOR ITCHING (two) times daily as needed. Apply bid )    •  omalizumab (XOLAIR) 150 MG Subcutaneous Recon Soln, RECONSTITUTE EACH OF 2 VIALS WITH 1.4ML STERILE WATER.  INJECT 1.2ML SEPARATELY SUBCUTANEOUSLY FROM EACH VIAL ONCE EVERY FOUR WEEKS (TOTAL=30    •  Glucose Bl Detail Level: Detailed difficulty urinating. Musculoskeletal: Negative. Negative for myalgias, back pain, joint pain and gait problem. Skin: Negative. Negative for rash. Neurological: Positive for light-headedness.  Negative for dizziness, tremors, weakness, numbness and Add 99725 Cpt? (Important Note: In 2017 The Use Of 82377 Is Being Tracked By Cms To Determine Future Global Period Reimbursement For Global Periods): yes motor deficit. Coordination and gait normal.   Skin: Skin is warm and dry. Psychiatric: She has a normal mood and affect. Thought content normal.     ASSESSMENT/ PLAN:   Light headedness  Not orthostatic, not vertigo , will adjust bp meds.      Essential Body Location Override (Optional - Billing Will Still Be Based On Selected Body Map Location If Applicable): right medial 5th toe Body Location Override (Optional - Billing Will Still Be Based On Selected Body Map Location If Applicable): right medial 2nd toe

## 2020-10-18 NOTE — H&P
[facility], [facility] - Gastroenterology                                                                                                               Reason for consult: r kg)  10/15/20 : 179 lb (81.2 kg)  10/09/20 : 176 lb 3.2 oz (79.9 kg)  10/08/20 : 177 lb (80.3 kg)  09/29/20 : 179 lb (81.2 kg)  06/23/20 : 175 lb (79.4 kg)       History, Medications, Allergies, ROS:      Past Medical History:   Diagnosis Date   • Abdomina • ergocalciferol 1.25 MG (29141 UT) Oral Cap TAKE 1 CAPSULE BY MOUTH EVERY WEEK 12 capsule 3   • folic acid 1 MG Oral Tab Take 1 tablet (1 mg total) by mouth once daily.  90 tablet 3   • Hydroxychloroquine Sulfate 200 MG Oral Tab TAKE 1 TABLET(200 MG) BY ASPIRIN  Clindamycin             HIVES    Comment:Other reaction(s): CLINDAMYCIN  Codeine                 HIVES    Comment:Other reaction(s): CODEINE             Other reaction(s): CODEINE PHOSPHATE  Erythromycin            HIVES    Comment:Other reaction( non-tender, non-distended no rebound or guarding, no masses, no hepatomegaly  MSK: No redness, no warmth, no swelling of joints  SKIN: No jaundice, no erythema, no rashes  HEMATOLOGIC: No bleeding, no bruising  NEURO: Alert and interactive, normal gait Lymphocyte Absolute 2.51 1.00 - 4.00 x10(3) uL    Monocyte Absolute 0.44 0.10 - 1.00 x10(3) uL    Eosinophil Absolute 0.08 0.00 - 0.70 x10(3) uL    Basophil Absolute 0.03 0.00 - 0.20 x10(3) uL    Immature Granulocyte Absolute 0.01 0.00 - 1.00 x10(3) uL trilyte)    3. Hold metformin the day prior and day of colonoscopy  Hold lisinopril day of if w/ mac at [facility] or [facility]    4. Read all bowel prep instructions carefully    5.  AVOID seeds, nuts, popcorn, raw fruits and vegetables (cooked is okay) for 2-3 days b

## 2020-10-20 ENCOUNTER — TELEPHONE (OUTPATIENT)
Dept: GASTROENTEROLOGY | Facility: CLINIC | Age: 60
End: 2020-10-20

## 2020-10-20 ENCOUNTER — OFFICE VISIT (OUTPATIENT)
Dept: GASTROENTEROLOGY | Facility: CLINIC | Age: 60
End: 2020-10-20
Payer: COMMERCIAL

## 2020-10-20 VITALS
DIASTOLIC BLOOD PRESSURE: 86 MMHG | HEIGHT: 64 IN | TEMPERATURE: 97 F | HEART RATE: 90 BPM | WEIGHT: 184 LBS | BODY MASS INDEX: 31.41 KG/M2 | SYSTOLIC BLOOD PRESSURE: 123 MMHG

## 2020-10-20 DIAGNOSIS — K59.00 CONSTIPATION, UNSPECIFIED CONSTIPATION TYPE: ICD-10-CM

## 2020-10-20 DIAGNOSIS — K59.09 CHRONIC CONSTIPATION: ICD-10-CM

## 2020-10-20 DIAGNOSIS — K62.5 BRBPR (BRIGHT RED BLOOD PER RECTUM): Primary | ICD-10-CM

## 2020-10-20 DIAGNOSIS — K62.5 BRIGHT RED BLOOD PER RECTUM: ICD-10-CM

## 2020-10-20 DIAGNOSIS — Z12.11 COLON CANCER SCREENING: Primary | ICD-10-CM

## 2020-10-20 DIAGNOSIS — Z12.11 COLON CANCER SCREENING: ICD-10-CM

## 2020-10-20 PROCEDURE — 3074F SYST BP LT 130 MM HG: CPT | Performed by: NURSE PRACTITIONER

## 2020-10-20 PROCEDURE — 3079F DIAST BP 80-89 MM HG: CPT | Performed by: NURSE PRACTITIONER

## 2020-10-20 PROCEDURE — 3008F BODY MASS INDEX DOCD: CPT | Performed by: NURSE PRACTITIONER

## 2020-10-20 PROCEDURE — 1111F DSCHRG MED/CURRENT MED MERGE: CPT | Performed by: NURSE PRACTITIONER

## 2020-10-20 PROCEDURE — S0285 CNSLT BEFORE SCREEN COLONOSC: HCPCS | Performed by: NURSE PRACTITIONER

## 2020-10-20 NOTE — PATIENT INSTRUCTIONS
-miralax one capful twice daily  -labs      1. Schedule colonoscopy with mac w/ Dr. Forrest Holt [Diagnosis: crc screening, brbpr, constipation]    2.  bowel prep from pharmacy (split trilyte)    3.  Hold metformin the day prior and day of colonoscopy  Hold

## 2020-10-20 NOTE — TELEPHONE ENCOUNTER
Scheduled for:  Colonoscopy 20859  Provider Name:  Du Couch  Date:  12/18/2020  Location:  [facility]  Sedation:  Mac  Time: 0945 Am (pt is aware to arrive at 0845 Am)    Prep:  Trilyte Prep instructions were given to pt in the office, pt verbalized understanding

## 2020-11-06 ENCOUNTER — DOCUMENTATION (OUTPATIENT)
Dept: CARDIOLOGY | Age: 60
End: 2020-11-06

## 2020-11-09 NOTE — DISCHARGE SUMMARY
[facility] - [facility]    Discharge Summary    Raiza Olvera Patient Status:  Inpatient    1960 MRN [id number]   Location [facility] 3W/SW Attending No att. providers found   Hosp Day # 1 PCP Belén Sinclair MD     Date of Admission: 10/ no significant change from 03/17/2017. Lower extremity venous Doppler was negative for DVT.   The patient was evaluated by Cardiology; the cardiologist recommended an echocardiogram.  If no regional wall motion abnormalities were found, ejection fraction w GFRAA, GFRNAA, CA, ALB, NA, K, CL, CO2, ALKPHO, AST, ALT, BILT, TP in the last 168 hours.   No results found for: PT, INR    Discharge Medications:      Discharge Medications      START taking these medications      Instructions Prescription details   Mecli Take 1 tablet (1 mg total) by mouth once daily.    Quantity: 90 tablet  Refills: 3     Hydroxychloroquine Sulfate 200 MG Tabs  Commonly known as: PLAQUENIL      TAKE 1 TABLET(200 MG) BY MOUTH TWICE DAILY   Quantity: 180 tablet  Refills: 3     hydrOXYzine up Visits  Leonel Garrett, APRN  [address] [facility]  [address] [facility] [phone]    On 10/13/2020  @10:00    Ed Jarquin MD  [address] [facility] [phone]    Call in 3 days  with a status update      Consu

## 2020-11-10 ENCOUNTER — TELEPHONE (OUTPATIENT)
Dept: CARDIOLOGY | Age: 60
End: 2020-11-10

## 2020-11-10 ENCOUNTER — ANCILLARY PROCEDURE (OUTPATIENT)
Dept: CARDIOLOGY | Age: 60
End: 2020-11-10
Attending: NURSE PRACTITIONER

## 2020-11-10 ENCOUNTER — ORDER TRANSCRIPTION (OUTPATIENT)
Dept: ADMINISTRATIVE | Facility: HOSPITAL | Age: 60
End: 2020-11-10

## 2020-11-10 ENCOUNTER — APPOINTMENT (OUTPATIENT)
Dept: CARDIOLOGY | Age: 60
End: 2020-11-10
Attending: NURSE PRACTITIONER

## 2020-11-10 DIAGNOSIS — R94.31 ABNORMAL ELECTROCARDIOGRAM (ECG) (EKG): ICD-10-CM

## 2020-11-10 DIAGNOSIS — R77.8 ELEVATED TROPONIN: ICD-10-CM

## 2020-11-10 DIAGNOSIS — R79.89 ELEVATED TROPONIN: ICD-10-CM

## 2020-11-10 DIAGNOSIS — R94.31 ABNORMAL ELECTROCARDIOGRAM: Primary | ICD-10-CM

## 2020-11-10 DIAGNOSIS — Z01.818 PREOP EXAMINATION: ICD-10-CM

## 2020-11-10 DIAGNOSIS — Z11.59 SCREENING FOR VIRAL DISEASE: ICD-10-CM

## 2020-11-19 ENCOUNTER — TELEPHONE (OUTPATIENT)
Dept: GASTROENTEROLOGY | Facility: CLINIC | Age: 60
End: 2020-11-19

## 2020-11-24 ENCOUNTER — HOSPITAL ENCOUNTER (OUTPATIENT)
Dept: NUCLEAR MEDICINE | Facility: HOSPITAL | Age: 60
Discharge: HOME | End: 2020-11-24
Attending: NURSE PRACTITIONER
Payer: COMMERCIAL

## 2020-11-24 ENCOUNTER — APPOINTMENT (OUTPATIENT)
Dept: LAB | Facility: HOSPITAL | Age: 60
End: 2020-11-24
Attending: NURSE PRACTITIONER
Payer: COMMERCIAL

## 2020-11-24 ENCOUNTER — HOSPITAL ENCOUNTER (OUTPATIENT)
Dept: CV DIAGNOSTICS | Facility: HOSPITAL | Age: 60
Discharge: HOME | End: 2020-11-24
Attending: NURSE PRACTITIONER
Payer: COMMERCIAL

## 2020-11-24 DIAGNOSIS — R77.8 ELEVATED TROPONIN: ICD-10-CM

## 2020-11-24 DIAGNOSIS — R94.31 ABNORMAL ELECTROCARDIOGRAM: ICD-10-CM

## 2020-11-24 LAB
ERYTHROCYTE [DISTWIDTH] IN BLOOD BY AUTOMATED COUNT: 41 %
ERYTHROCYTE [DISTWIDTH] IN BLOOD: 11.9 %
HCT VFR BLD CALC: 36.3 %
HGB BLD-MCNC: 12 G/DL
MCH RBC QN AUTO: 31.2 PG
MCHC RBC AUTO-ENTMCNC: 33.1 G/DL
MCV RBC AUTO: 94.3 FL
PLATELET # BLD: 193 K/MCL
RBC # BLD: 3.85 10*6/UL
WBC # BLD: 4.8 K/MCL

## 2020-11-24 PROCEDURE — 85025 COMPLETE CBC W/AUTO DIFF WBC: CPT | Performed by: NURSE PRACTITIONER

## 2020-11-24 PROCEDURE — 93018 CV STRESS TEST I&R ONLY: CPT | Performed by: NURSE PRACTITIONER

## 2020-11-24 PROCEDURE — 93016 CV STRESS TEST SUPVJ ONLY: CPT | Performed by: NURSE PRACTITIONER

## 2020-11-24 PROCEDURE — 78452 HT MUSCLE IMAGE SPECT MULT: CPT | Performed by: NURSE PRACTITIONER

## 2020-11-24 PROCEDURE — 93017 CV STRESS TEST TRACING ONLY: CPT | Performed by: NURSE PRACTITIONER

## 2020-11-24 PROCEDURE — 36415 COLL VENOUS BLD VENIPUNCTURE: CPT | Performed by: NURSE PRACTITIONER

## 2020-11-25 ENCOUNTER — APPOINTMENT (OUTPATIENT)
Dept: GENERAL RADIOLOGY | Age: 60
End: 2020-11-25
Attending: EMERGENCY MEDICINE

## 2020-11-25 ENCOUNTER — APPOINTMENT (OUTPATIENT)
Dept: CT IMAGING | Age: 60
End: 2020-11-25
Attending: STUDENT IN AN ORGANIZED HEALTH CARE EDUCATION/TRAINING PROGRAM

## 2020-11-25 ENCOUNTER — HOSPITAL ENCOUNTER (EMERGENCY)
Age: 60
Discharge: HOME | End: 2020-11-25
Attending: STUDENT IN AN ORGANIZED HEALTH CARE EDUCATION/TRAINING PROGRAM

## 2020-11-25 VITALS
WEIGHT: 185 LBS | DIASTOLIC BLOOD PRESSURE: 90 MMHG | SYSTOLIC BLOOD PRESSURE: 160 MMHG | HEART RATE: 82 BPM | OXYGEN SATURATION: 100 % | BODY MASS INDEX: 31.58 KG/M2 | HEIGHT: 64 IN | TEMPERATURE: 98.6 F | RESPIRATION RATE: 16 BRPM
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
## 2020-11-25 DIAGNOSIS — S20.211A CONTUSION OF RIGHT CHEST WALL, INITIAL ENCOUNTER: ICD-10-CM

## 2020-11-25 PROCEDURE — 99285 EMERGENCY DEPT VISIT HI MDM: CPT

## 2020-11-25 PROCEDURE — 10002805 HB CONTRAST AGENT: Performed by: STUDENT IN AN ORGANIZED HEALTH CARE EDUCATION/TRAINING PROGRAM

## 2020-11-25 PROCEDURE — 71046 X-RAY EXAM CHEST 2 VIEWS: CPT

## 2020-11-25 PROCEDURE — 73610 X-RAY EXAM OF ANKLE: CPT

## 2020-11-25 PROCEDURE — 99284 EMERGENCY DEPT VISIT MOD MDM: CPT | Performed by: EMERGENCY MEDICINE

## 2020-11-25 PROCEDURE — 71260 CT THORAX DX C+: CPT

## 2020-11-25 PROCEDURE — 10002803 HB RX 637: Performed by: EMERGENCY MEDICINE

## 2020-11-25 RX ORDER — HYDROMORPHONE HYDROCHLORIDE 2 MG/1
2 TABLET ORAL EVERY 4 HOURS PRN
Status: DISCONTINUED | OUTPATIENT
Start: 2020-11-25 | End: 2020-11-25 | Stop reason: HOSPADM

## 2020-11-25 RX ADMIN — HYDROMORPHONE HYDROCHLORIDE 2 MG: 2 TABLET ORAL at 18:08

## 2020-11-25 RX ADMIN — IOHEXOL 80 ML: 300 INJECTION, SOLUTION INTRAVENOUS at 19:05

## 2020-11-25 ASSESSMENT — ENCOUNTER SYMPTOMS
CHILLS: 0
SHORTNESS OF BREATH: 0
FEVER: 0
SORE THROAT: 0
ABDOMINAL PAIN: 0
VOMITING: 0
NAUSEA: 0
DIARRHEA: 0
COUGH: 0
HEADACHES: 0

## 2020-11-25 ASSESSMENT — PAIN SCALES - GENERAL
PAINLEVEL_OUTOF10: 9
PAINLEVEL_OUTOF10: 9

## 2020-12-04 ENCOUNTER — OFFICE VISIT (OUTPATIENT)
Dept: INTERNAL MEDICINE CLINIC | Facility: CLINIC | Age: 60
End: 2020-12-04
Payer: COMMERCIAL

## 2020-12-04 VITALS
DIASTOLIC BLOOD PRESSURE: 90 MMHG | SYSTOLIC BLOOD PRESSURE: 146 MMHG | WEIGHT: 180 LBS | TEMPERATURE: 97 F | HEART RATE: 72 BPM | HEIGHT: 64 IN | BODY MASS INDEX: 30.73 KG/M2
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
## 2020-12-04 PROCEDURE — 3008F BODY MASS INDEX DOCD: CPT | Performed by: INTERNAL MEDICINE

## 2020-12-04 PROCEDURE — 3080F DIAST BP >= 90 MM HG: CPT | Performed by: INTERNAL MEDICINE

## 2020-12-04 PROCEDURE — 3077F SYST BP >= 140 MM HG: CPT | Performed by: INTERNAL MEDICINE

## 2020-12-04 PROCEDURE — 99214 OFFICE O/P EST MOD 30 MIN: CPT | Performed by: INTERNAL MEDICINE
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
## 2020-12-07 RX ORDER — AMLODIPINE BESYLATE 5 MG/1
5 TABLET ORAL DAILY
COMMUNITY
Start: 2020-09-30

## 2020-12-15 ENCOUNTER — OFFICE VISIT (OUTPATIENT)
Dept: INTERNAL MEDICINE CLINIC | Facility: CLINIC | Age: 60
End: 2020-12-15
Payer: COMMERCIAL

## 2020-12-15 ENCOUNTER — LAB ENCOUNTER (OUTPATIENT)
Dept: LAB | Age: 60
End: 2020-12-15
Attending: INTERNAL MEDICINE
Payer: COMMERCIAL

## 2020-12-15 VITALS
TEMPERATURE: 97 F | BODY MASS INDEX: 31.07 KG/M2 | HEIGHT: 64 IN | WEIGHT: 182 LBS | DIASTOLIC BLOOD PRESSURE: 90 MMHG | SYSTOLIC BLOOD PRESSURE: 120 MMHG | HEART RATE: 92 BPM

## 2020-12-15 DIAGNOSIS — Z01.818 PRE-OP TESTING: ICD-10-CM
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
## 2020-12-15 PROCEDURE — 3080F DIAST BP >= 90 MM HG: CPT | Performed by: INTERNAL MEDICINE

## 2020-12-15 PROCEDURE — 3008F BODY MASS INDEX DOCD: CPT | Performed by: INTERNAL MEDICINE

## 2020-12-15 PROCEDURE — 99214 OFFICE O/P EST MOD 30 MIN: CPT | Performed by: INTERNAL MEDICINE

## 2020-12-15 PROCEDURE — 3074F SYST BP LT 130 MM HG: CPT | Performed by: INTERNAL MEDICINE

## 2020-12-15 RX ORDER — HYDROCODONE BITARTRATE AND ACETAMINOPHEN 10; 325 MG/1; MG/1
1 TABLET ORAL EVERY 8 HOURS PRN
Qty: 90 TABLET | Refills: 0 | Status: SHIPPED | OUTPATIENT
Start: 2020-12-15 | End: 2021-09-28

## 2020-12-15 NOTE — ASSESSMENT & PLAN NOTE
Patient has bilateral breast pain , and total back pain and coccyx pain , right ankle headaches , neck pain are improving. In addition to pain pump will add oral Joeline Goad in 2 weeks, no PT yet due to intense pain , muscle relaxants didn't work.
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
## 2020-12-16 ENCOUNTER — APPOINTMENT (OUTPATIENT)
Dept: CARDIOLOGY | Age: 60
End: 2020-12-16

## 2020-12-18 ENCOUNTER — TELEPHONE (OUTPATIENT)
Dept: GASTROENTEROLOGY | Facility: CLINIC | Age: 60
End: 2020-12-18

## 2020-12-22 NOTE — TELEPHONE ENCOUNTER
Dr. David Blanton - please clarify prep instructions. Will the pt be using 2 bowel prep kits or just adding in 1 bottle of OTC Mag Citrate + 1 bowel prep? Please advise & also place a new order for bowel prep/s. Thank you!

## 2020-12-23 RX ORDER — POLYETHYLENE GLYCOL 3350, SODIUM CHLORIDE, SODIUM BICARBONATE, POTASSIUM CHLORIDE 420; 11.2; 5.72; 1.48 G/4L; G/4L; G/4L; G/4L
POWDER, FOR SOLUTION ORAL
Qty: 1 BOTTLE | Refills: 0 | Status: SHIPPED | OUTPATIENT
Start: 2020-12-23

## 2020-12-23 NOTE — TELEPHONE ENCOUNTER
3 days of CLD  Prior to this miralax two times a day for 1 week  Buy over the counter dulcolax laxative, and take daily for 3 days prior to drinking the bowel prep. Two day prior take mag citrate in the AM then start trilyte prep.  IF no BM then call back

## 2020-12-29 ENCOUNTER — OFFICE VISIT (OUTPATIENT)
Dept: INTERNAL MEDICINE CLINIC | Facility: CLINIC | Age: 60
End: 2020-12-29
Payer: COMMERCIAL

## 2020-12-29 VITALS
HEART RATE: 80 BPM | SYSTOLIC BLOOD PRESSURE: 146 MMHG | TEMPERATURE: 97 F | HEIGHT: 64 IN | BODY MASS INDEX: 30.73 KG/M2 | DIASTOLIC BLOOD PRESSURE: 80 MMHG | WEIGHT: 180 LBS
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
## 2020-12-29 PROCEDURE — 99214 OFFICE O/P EST MOD 30 MIN: CPT | Performed by: INTERNAL MEDICINE

## 2020-12-29 PROCEDURE — 3008F BODY MASS INDEX DOCD: CPT | Performed by: INTERNAL MEDICINE

## 2020-12-29 PROCEDURE — 3077F SYST BP >= 140 MM HG: CPT | Performed by: INTERNAL MEDICINE

## 2020-12-29 PROCEDURE — 3079F DIAST BP 80-89 MM HG: CPT | Performed by: INTERNAL MEDICINE

## 2020-12-29 NOTE — ASSESSMENT & PLAN NOTE
Coccyx pain is better,   Right breast improving, right abdominal pain , superficial and chest wall pain persist, mobility limited. She is improving , but slowly , besides pain pump she is taking Norco. Recheck in 3 weeks.
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
## 2021-01-18 ENCOUNTER — OFFICE VISIT (OUTPATIENT)
Dept: INTERNAL MEDICINE CLINIC | Facility: CLINIC | Age: 61
End: 2021-01-18
Payer: COMMERCIAL

## 2021-01-18 VITALS
WEIGHT: 176 LBS | TEMPERATURE: 98 F | HEIGHT: 64 IN | BODY MASS INDEX: 30.05 KG/M2 | SYSTOLIC BLOOD PRESSURE: 134 MMHG | HEART RATE: 72 BPM | DIASTOLIC BLOOD PRESSURE: 90 MMHG
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
## 2021-01-18 PROCEDURE — 3075F SYST BP GE 130 - 139MM HG: CPT | Performed by: INTERNAL MEDICINE

## 2021-01-18 PROCEDURE — 99214 OFFICE O/P EST MOD 30 MIN: CPT | Performed by: INTERNAL MEDICINE

## 2021-01-18 PROCEDURE — 3080F DIAST BP >= 90 MM HG: CPT | Performed by: INTERNAL MEDICINE

## 2021-01-18 PROCEDURE — 3008F BODY MASS INDEX DOCD: CPT | Performed by: INTERNAL MEDICINE

## 2021-01-18 RX ORDER — HYDROCHLOROTHIAZIDE 12.5 MG/1
12.5 CAPSULE, GELATIN COATED ORAL DAILY
COMMUNITY
Start: 2020-12-31 | End: 2021-03-29

## 2021-01-18 RX ORDER — CARISOPRODOL 350 MG/1
350 TABLET ORAL 2 TIMES DAILY PRN
Qty: 60 TABLET | Refills: 1 | Status: SHIPPED | OUTPATIENT
Start: 2021-01-18 | End: 2021-09-28

## 2021-01-18 RX ORDER — CYCLOBENZAPRINE HCL 5 MG
TABLET ORAL
COMMUNITY
Start: 2021-01-15 | End: 2021-09-28
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
## 2021-01-25 ENCOUNTER — TELEPHONE (OUTPATIENT)
Dept: INTERNAL MEDICINE CLINIC | Facility: CLINIC | Age: 61
End: 2021-01-25

## 2021-01-25 DIAGNOSIS — Z12.31 VISIT FOR SCREENING MAMMOGRAM: Primary | ICD-10-CM

## 2021-01-28 RX ORDER — CYCLOBENZAPRINE HCL 5 MG
TABLET ORAL
COMMUNITY
Start: 2021-01-15

## 2021-01-28 RX ORDER — POLYETHYLENE GLYCOL 3350, SODIUM CHLORIDE, SODIUM BICARBONATE, POTASSIUM CHLORIDE 420; 11.2; 5.72; 1.48 G/4L; G/4L; G/4L; G/4L
POWDER, FOR SOLUTION ORAL
COMMUNITY
Start: 2020-12-23

## 2021-01-28 RX ORDER — HYDROCODONE BITARTRATE AND ACETAMINOPHEN 10; 325 MG/1; MG/1
1 TABLET ORAL EVERY 8 HOURS PRN
COMMUNITY
Start: 2020-12-15

## 2021-01-29 ENCOUNTER — OFFICE VISIT (OUTPATIENT)
Dept: CARDIOLOGY | Age: 61
End: 2021-01-29

## 2021-01-29 VITALS
WEIGHT: 178 LBS | OXYGEN SATURATION: 99 % | DIASTOLIC BLOOD PRESSURE: 80 MMHG | HEART RATE: 82 BPM | HEIGHT: 64 IN | BODY MASS INDEX: 30.39 KG/M2 | SYSTOLIC BLOOD PRESSURE: 132 MMHG

## 2021-01-29 DIAGNOSIS — I10 ESSENTIAL HYPERTENSION: Primary | ICD-10-CM

## 2021-01-29 DIAGNOSIS — E78.00 PURE HYPERCHOLESTEROLEMIA: ICD-10-CM

## 2021-01-29 PROCEDURE — 99244 OFF/OP CNSLTJ NEW/EST MOD 40: CPT | Performed by: INTERNAL MEDICINE

## 2021-01-29 SDOH — HEALTH STABILITY: MENTAL HEALTH: HOW OFTEN DO YOU HAVE A DRINK CONTAINING ALCOHOL?: NEVER

## 2021-01-29 ASSESSMENT — PATIENT HEALTH QUESTIONNAIRE - PHQ9
CLINICAL INTERPRETATION OF PHQ2 SCORE: NO FURTHER SCREENING NEEDED
SUM OF ALL RESPONSES TO PHQ9 QUESTIONS 1 AND 2: 0
2. FEELING DOWN, DEPRESSED OR HOPELESS: NOT AT ALL
SUM OF ALL RESPONSES TO PHQ9 QUESTIONS 1 AND 2: 0
CLINICAL INTERPRETATION OF PHQ9 SCORE: NO FURTHER SCREENING NEEDED
1. LITTLE INTEREST OR PLEASURE IN DOING THINGS: NOT AT ALL

## 2021-02-23 ENCOUNTER — HOSPITAL ENCOUNTER (OUTPATIENT)
Dept: CT IMAGING | Facility: HOSPITAL | Age: 61
Discharge: HOME | End: 2021-02-23
Attending: INTERNAL MEDICINE

## 2021-02-23 DIAGNOSIS — Z13.9 ENCOUNTER FOR SCREENING: ICD-10-CM

## 2021-03-05 NOTE — TELEPHONE ENCOUNTER
Dr Jigna Al I spoke to patient in regards scheduling colonoscopy. Patient requested to schedule office visit with you to discuss certain concerns. Patient prefers to have cologuard test instead of colonoscopy.  Appointment scheduled for 5/10/21 as per patient

## 2021-03-16 ENCOUNTER — TELEPHONE (OUTPATIENT)
Dept: INTERNAL MEDICINE CLINIC | Facility: CLINIC | Age: 61
End: 2021-03-16

## 2021-03-23 RX ORDER — ERGOCALCIFEROL 1.25 MG/1
CAPSULE ORAL
Qty: 12 CAPSULE | Refills: 1 | Status: SHIPPED | OUTPATIENT
Start: 2021-03-23 | End: 2021-09-20

## 2021-03-23 RX ORDER — METHOTREXATE 2.5 MG/1
TABLET ORAL
Qty: 96 TABLET | Refills: 1 | Status: SHIPPED | OUTPATIENT
Start: 2021-03-23 | End: 2021-09-20

## 2021-03-29 RX ORDER — METFORMIN HYDROCHLORIDE 500 MG/1
500 TABLET, EXTENDED RELEASE ORAL 2 TIMES DAILY WITH MEALS
Qty: 180 TABLET | Refills: 3 | Status: SHIPPED | OUTPATIENT
Start: 2021-03-29 | End: 2021-09-28

## 2021-03-29 RX ORDER — HYDROCHLOROTHIAZIDE 12.5 MG/1
12.5 CAPSULE, GELATIN COATED ORAL DAILY
Qty: 90 CAPSULE | Refills: 3 | Status: SHIPPED | OUTPATIENT
Start: 2021-03-29 | End: 2021-09-28

## 2021-03-29 RX ORDER — FOLIC ACID 1 MG/1
1 TABLET ORAL DAILY
Qty: 90 TABLET | Refills: 3 | Status: SHIPPED | OUTPATIENT
Start: 2021-03-29 | End: 2021-09-28

## 2021-03-29 RX ORDER — LISINOPRIL 20 MG/1
20 TABLET ORAL DAILY
Qty: 90 TABLET | Refills: 3 | Status: SHIPPED | OUTPATIENT
Start: 2021-03-29 | End: 2021-09-28

## 2021-03-29 RX ORDER — ATORVASTATIN CALCIUM 20 MG/1
20 TABLET, FILM COATED ORAL DAILY
Qty: 90 TABLET | Refills: 3 | Status: SHIPPED | OUTPATIENT
Start: 2021-03-29 | End: 2021-09-28

## 2021-03-30 ENCOUNTER — OFFICE VISIT (OUTPATIENT)
Dept: INTERNAL MEDICINE CLINIC | Facility: CLINIC | Age: 61
End: 2021-03-30
Payer: COMMERCIAL

## 2021-03-30 ENCOUNTER — LAB ENCOUNTER (OUTPATIENT)
Dept: LAB | Age: 61
End: 2021-03-30
Attending: INTERNAL MEDICINE
Payer: COMMERCIAL

## 2021-03-30 VITALS
SYSTOLIC BLOOD PRESSURE: 144 MMHG | WEIGHT: 176 LBS | BODY MASS INDEX: 30.05 KG/M2 | HEIGHT: 64 IN | DIASTOLIC BLOOD PRESSURE: 84 MMHG | TEMPERATURE: 98 F | HEART RATE: 80 BPM

## 2021-03-30 DIAGNOSIS — E78.00 PURE HYPERCHOLESTEROLEMIA: ICD-10-CM

## 2021-03-30 DIAGNOSIS — I10 ESSENTIAL HYPERTENSION, BENIGN: Primary | ICD-10-CM

## 2021-03-30 DIAGNOSIS — E11.9 DIABETES MELLITUS WITHOUT COMPLICATION (HCC): ICD-10-CM

## 2021-03-30 LAB
EST. AVERAGE GLUCOSE BLD GHB EST-MCNC: 114 MG/DL (ref 68–126)
HBA1C MFR BLD HPLC: 5.6 % (ref ?–5.7)

## 2021-03-30 PROCEDURE — 3044F HG A1C LEVEL LT 7.0%: CPT | Performed by: INTERNAL MEDICINE

## 2021-03-30 PROCEDURE — 3008F BODY MASS INDEX DOCD: CPT | Performed by: INTERNAL MEDICINE

## 2021-03-30 PROCEDURE — 36415 COLL VENOUS BLD VENIPUNCTURE: CPT | Performed by: INTERNAL MEDICINE

## 2021-03-30 PROCEDURE — 3077F SYST BP >= 140 MM HG: CPT | Performed by: INTERNAL MEDICINE

## 2021-03-30 PROCEDURE — 99214 OFFICE O/P EST MOD 30 MIN: CPT | Performed by: INTERNAL MEDICINE

## 2021-03-30 PROCEDURE — 3079F DIAST BP 80-89 MM HG: CPT | Performed by: INTERNAL MEDICINE

## 2021-03-30 PROCEDURE — 83036 HEMOGLOBIN GLYCOSYLATED A1C: CPT | Performed by: INTERNAL MEDICINE

## 2021-03-30 NOTE — PROGRESS NOTES
HPI:    Patient ID: Kanika Adam is a 62year old female. HPIpatient is doing well, no chest pain , no shortness of breath , no swelling. She had one covid vaccine, took Benadryl as she got itching.  Her  is in the hospital now and therefore he CAPSULE BY MOUTH EVERY WEEK 12 capsule 1   • Methotrexate Sodium 2.5 MG Oral Tab TAKE 8 TABLETS BY MOUTH EVERY WEEK 96 tablet 1   • cyclobenzaprine 5 MG Oral Tab      • Carisoprodol 350 MG Oral Tab Take 1 tablet (350 mg total) by mouth 2 (two) times daily TRUETEST TEST) In Vitro Strip take by Intradermal route check  glucose 3 times a day       Allergies:  Acetaminophen           HIVES    Comment:Other reaction(s): ACETAMINOPHEN  Adhesive Tape (Leonela*    HIVES    Comment:Other reaction(s): TAPE, OCCLUSIVE AD gallop. Pulmonary:      Effort: Pulmonary effort is normal. No respiratory distress. Breath sounds: Normal breath sounds. No wheezing or rales. Chest:      Chest wall: No tenderness. Abdominal:      General: There is no distension.       Jarek Vazquez

## 2021-07-01 RX ORDER — HYDROXYCHLOROQUINE SULFATE 200 MG/1
TABLET, FILM COATED ORAL
Qty: 180 TABLET | Refills: 5 | Status: SHIPPED | OUTPATIENT
Start: 2021-07-01 | End: 2021-09-28

## 2021-07-12 ENCOUNTER — OFFICE VISIT (OUTPATIENT)
Dept: INTERNAL MEDICINE CLINIC | Facility: CLINIC | Age: 61
End: 2021-07-12
Payer: COMMERCIAL

## 2021-07-12 VITALS
TEMPERATURE: 98 F | DIASTOLIC BLOOD PRESSURE: 80 MMHG | BODY MASS INDEX: 27.83 KG/M2 | SYSTOLIC BLOOD PRESSURE: 136 MMHG | WEIGHT: 163 LBS | HEIGHT: 64 IN | HEART RATE: 68 BPM

## 2021-07-12 DIAGNOSIS — I10 ESSENTIAL HYPERTENSION, BENIGN: Primary | ICD-10-CM

## 2021-07-12 DIAGNOSIS — E11.9 DIABETES MELLITUS WITHOUT COMPLICATION (HCC): ICD-10-CM

## 2021-07-12 DIAGNOSIS — F51.01 PRIMARY INSOMNIA: ICD-10-CM

## 2021-07-12 DIAGNOSIS — E78.00 PURE HYPERCHOLESTEROLEMIA: ICD-10-CM

## 2021-07-12 LAB
ALBUMIN SERPL-MCNC: 4 G/DL (ref 3.4–5)
ALBUMIN/GLOB SERPL: 1.2 {RATIO} (ref 1–2)
ALP LIVER SERPL-CCNC: 64 U/L
ALT SERPL-CCNC: 28 U/L
ANION GAP SERPL CALC-SCNC: 4 MMOL/L (ref 0–18)
AST SERPL-CCNC: 22 U/L (ref 15–37)
BASOPHILS # BLD AUTO: 0.02 X10(3) UL (ref 0–0.2)
BASOPHILS NFR BLD AUTO: 0.4 %
BILIRUB SERPL-MCNC: 0.5 MG/DL (ref 0.1–2)
BUN BLD-MCNC: 17 MG/DL (ref 7–18)
BUN/CREAT SERPL: 19.1 (ref 10–20)
CALCIUM BLD-MCNC: 9.4 MG/DL (ref 8.5–10.1)
CHLORIDE SERPL-SCNC: 107 MMOL/L (ref 98–112)
CHOLEST SMN-MCNC: 153 MG/DL (ref ?–200)
CO2 SERPL-SCNC: 30 MMOL/L (ref 21–32)
CREAT BLD-MCNC: 0.89 MG/DL
DEPRECATED RDW RBC AUTO: 39.4 FL (ref 35.1–46.3)
EOSINOPHIL # BLD AUTO: 0.07 X10(3) UL (ref 0–0.7)
EOSINOPHIL NFR BLD AUTO: 1.4 %
ERYTHROCYTE [DISTWIDTH] IN BLOOD BY AUTOMATED COUNT: 11.7 % (ref 11–15)
EST. AVERAGE GLUCOSE BLD GHB EST-MCNC: 114 MG/DL (ref 68–126)
GLOBULIN PLAS-MCNC: 3.4 G/DL (ref 2.8–4.4)
GLUCOSE BLD-MCNC: 91 MG/DL (ref 70–99)
HBA1C MFR BLD HPLC: 5.6 % (ref ?–5.7)
HCT VFR BLD AUTO: 35 %
HDLC SERPL-MCNC: 64 MG/DL (ref 40–59)
HGB BLD-MCNC: 11.7 G/DL
IMM GRANULOCYTES # BLD AUTO: 0.01 X10(3) UL (ref 0–1)
IMM GRANULOCYTES NFR BLD: 0.2 %
LDLC SERPL CALC-MCNC: 75 MG/DL (ref ?–100)
LYMPHOCYTES # BLD AUTO: 2.18 X10(3) UL (ref 1–4)
LYMPHOCYTES NFR BLD AUTO: 45.1 %
M PROTEIN MFR SERPL ELPH: 7.4 G/DL (ref 6.4–8.2)
MCH RBC QN AUTO: 31.1 PG (ref 26–34)
MCHC RBC AUTO-ENTMCNC: 33.4 G/DL (ref 31–37)
MCV RBC AUTO: 93.1 FL
MONOCYTES # BLD AUTO: 0.5 X10(3) UL (ref 0.1–1)
MONOCYTES NFR BLD AUTO: 10.4 %
NEUTROPHILS # BLD AUTO: 2.05 X10 (3) UL (ref 1.5–7.7)
NEUTROPHILS # BLD AUTO: 2.05 X10(3) UL (ref 1.5–7.7)
NEUTROPHILS NFR BLD AUTO: 42.5 %
NONHDLC SERPL-MCNC: 89 MG/DL (ref ?–130)
OSMOLALITY SERPL CALC.SUM OF ELEC: 293 MOSM/KG (ref 275–295)
PATIENT FASTING Y/N/NP: YES
PATIENT FASTING Y/N/NP: YES
PLATELET # BLD AUTO: 193 10(3)UL (ref 150–450)
POTASSIUM SERPL-SCNC: 3.6 MMOL/L (ref 3.5–5.1)
RBC # BLD AUTO: 3.76 X10(6)UL
SODIUM SERPL-SCNC: 141 MMOL/L (ref 136–145)
TRIGL SERPL-MCNC: 75 MG/DL (ref 30–149)
VLDLC SERPL CALC-MCNC: 12 MG/DL (ref 0–30)
WBC # BLD AUTO: 4.8 X10(3) UL (ref 4–11)

## 2021-07-12 PROCEDURE — 3075F SYST BP GE 130 - 139MM HG: CPT | Performed by: INTERNAL MEDICINE

## 2021-07-12 PROCEDURE — 99214 OFFICE O/P EST MOD 30 MIN: CPT | Performed by: INTERNAL MEDICINE

## 2021-07-12 PROCEDURE — 3008F BODY MASS INDEX DOCD: CPT | Performed by: INTERNAL MEDICINE

## 2021-07-12 PROCEDURE — 3044F HG A1C LEVEL LT 7.0%: CPT | Performed by: INTERNAL MEDICINE

## 2021-07-12 PROCEDURE — 3079F DIAST BP 80-89 MM HG: CPT | Performed by: INTERNAL MEDICINE

## 2021-07-12 RX ORDER — MIRTAZAPINE 7.5 MG/1
7.5 TABLET, FILM COATED ORAL NIGHTLY
Qty: 30 TABLET | Refills: 5 | Status: SHIPPED | OUTPATIENT
Start: 2021-07-12 | End: 2021-09-28
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
## 2021-07-12 NOTE — ASSESSMENT & PLAN NOTE
Lipids are controlled. Check labs today . DISPLAY PLAN FREE TEXT DISPLAY PLAN FREE TEXT DISPLAY PLAN FREE TEXT DISPLAY PLAN FREE TEXT DISPLAY PLAN FREE TEXT DISPLAY PLAN FREE TEXT DISPLAY PLAN FREE TEXT DISPLAY PLAN FREE TEXT

## 2021-08-02 ENCOUNTER — OFFICE VISIT (OUTPATIENT)
Dept: GASTROENTEROLOGY | Facility: CLINIC | Age: 61
End: 2021-08-02
Payer: COMMERCIAL

## 2021-08-02 VITALS
SYSTOLIC BLOOD PRESSURE: 160 MMHG | WEIGHT: 167 LBS | HEIGHT: 64 IN | HEART RATE: 74 BPM | BODY MASS INDEX: 28.51 KG/M2 | DIASTOLIC BLOOD PRESSURE: 98 MMHG

## 2021-08-02 DIAGNOSIS — K62.5 RECTAL BLEEDING: Primary | ICD-10-CM

## 2021-08-02 PROCEDURE — 3008F BODY MASS INDEX DOCD: CPT | Performed by: INTERNAL MEDICINE

## 2021-08-02 PROCEDURE — 3080F DIAST BP >= 90 MM HG: CPT | Performed by: INTERNAL MEDICINE

## 2021-08-02 PROCEDURE — 99214 OFFICE O/P EST MOD 30 MIN: CPT | Performed by: INTERNAL MEDICINE

## 2021-08-02 PROCEDURE — 3077F SYST BP >= 140 MM HG: CPT | Performed by: INTERNAL MEDICINE

## 2021-08-02 RX ORDER — AMLODIPINE BESYLATE 5 MG/1
5 TABLET ORAL DAILY
COMMUNITY
End: 2021-09-28

## 2021-08-02 NOTE — PATIENT INSTRUCTIONS
1. BRBPR  2. Constipation  3.  Multiple abdominal surgery      Recommend:  1. Schedule colonoscopy with mac w/ Dr. Mirtha Bowen [Diagnosis: crc screening, brbpr, constipation]     2. Mag citrate 1 week prior to procedure, then miralax daily one capful in AM and PM

## 2021-08-02 NOTE — PROGRESS NOTES
[address] Gastroenterology  Clinic Follow-up Visit    Patient presents with:   Follow - Up: discuss possible cologuard test         Subjective/HPI:   Samara Melendez is a 62year old female, patient of Dr. Carolyn Sweet with history of DMII, HTN, who presents f history of chest pain or shortness of breath.     NSAIDS: no  Tobacco: no  Alcohol: no  Illicit drugs: no     No FH GI malignancy     No history of adverse reaction to sedation  No PATIENCE  No anticoagulants  No pacemaker/defibrillator  Intrathecal pain pump    (20 mg total) by mouth daily. 90 tablet 3   • folic acid 1 MG Oral Tab Take 1 tablet (1 mg total) by mouth daily.  90 tablet 3   • ERGOCALCIFEROL 1.25 MG (65512 UT) Oral Cap TAKE 1 CAPSULE BY MOUTH EVERY WEEK 12 capsule 1   • Methotrexate Sodium 2.5 MG Oral Bicarb-NaCl (TRILYTE) 420 g Oral Recon Soln As directed. (Patient not taking: Reported on 8/2/2021 ) 1 Bottle 0   • HYDROcodone-acetaminophen  MG Oral Tab Take 1 tablet by mouth every 8 (eight) hours as needed for Pain.  (Patient not taking: Reported breath  CARDIOVASCULAR:  negative for  chest pain  GASTROINTESTINAL:  see HPI  GENITOURINARY:  negative for dysuria and hematuria   SKIN:  negative for  rash  ALLERGIC/IMMUNOLOGIC:  negative for hay fever allergies  ENDOCRINE:  negative for cold intoleranc then take an enema.      3. Hold metformin the day prior and day of colonoscopy  Hold lisinopril day of if w/ mac at [facility] or [facility]     4. Read all bowel prep instructions carefully     5. AVOID seeds, nuts, popcorn, raw fruits and vegetables (cooked is okay)

## 2021-08-03 ENCOUNTER — TELEPHONE (OUTPATIENT)
Dept: GASTROENTEROLOGY | Facility: CLINIC | Age: 61
End: 2021-08-03

## 2021-08-03 DIAGNOSIS — Z12.11 SCREENING FOR COLORECTAL CANCER: Primary | ICD-10-CM

## 2021-08-03 DIAGNOSIS — K62.5 RECTAL BLEEDING: ICD-10-CM

## 2021-08-03 DIAGNOSIS — K59.00 CONSTIPATION, UNSPECIFIED CONSTIPATION TYPE: ICD-10-CM

## 2021-08-03 DIAGNOSIS — Z12.12 SCREENING FOR COLORECTAL CANCER: Primary | ICD-10-CM

## 2021-08-03 NOTE — TELEPHONE ENCOUNTER
Left messages for patient to call back in regards to changing her procedure dates for Colonoscopy to confirm this with patient .     Scheduled for:  Colonoscopy 20893   Provider Name:  Dr. Larisa Huber  Date:  12/3/21  Location:  [facility]   Sedation:  Mac   Time: 2186 Patient verbally understood & will await a phone call from PAT to schedule.       Further instructions given by staff:

## 2021-09-20 RX ORDER — ERGOCALCIFEROL 1.25 MG/1
CAPSULE ORAL
Qty: 12 CAPSULE | Refills: 1 | Status: SHIPPED | OUTPATIENT
Start: 2021-09-20 | End: 2021-09-28

## 2021-09-20 RX ORDER — METHOTREXATE 2.5 MG/1
TABLET ORAL
Qty: 96 TABLET | Refills: 1 | Status: SHIPPED | OUTPATIENT
Start: 2021-09-20 | End: 2021-09-28

## 2021-09-28 ENCOUNTER — OFFICE VISIT (OUTPATIENT)
Dept: INTERNAL MEDICINE CLINIC | Facility: CLINIC | Age: 61
End: 2021-09-28
Payer: COMMERCIAL

## 2021-09-28 VITALS
WEIGHT: 166 LBS | HEIGHT: 64 IN | DIASTOLIC BLOOD PRESSURE: 90 MMHG | BODY MASS INDEX: 28.34 KG/M2 | SYSTOLIC BLOOD PRESSURE: 160 MMHG | HEART RATE: 88 BPM | RESPIRATION RATE: 16 BRPM | TEMPERATURE: 98 F

## 2021-09-28 DIAGNOSIS — B07.0 PLANTAR WART: ICD-10-CM

## 2021-09-28 DIAGNOSIS — E11.9 DIABETES MELLITUS WITHOUT COMPLICATION (HCC): ICD-10-CM

## 2021-09-28 DIAGNOSIS — I10 ESSENTIAL HYPERTENSION, BENIGN: Primary | ICD-10-CM

## 2021-09-28 DIAGNOSIS — E78.00 PURE HYPERCHOLESTEROLEMIA: ICD-10-CM

## 2021-09-28 PROCEDURE — 99214 OFFICE O/P EST MOD 30 MIN: CPT | Performed by: INTERNAL MEDICINE

## 2021-09-28 PROCEDURE — 3077F SYST BP >= 140 MM HG: CPT | Performed by: INTERNAL MEDICINE

## 2021-09-28 PROCEDURE — 3008F BODY MASS INDEX DOCD: CPT | Performed by: INTERNAL MEDICINE

## 2021-09-28 PROCEDURE — 3080F DIAST BP >= 90 MM HG: CPT | Performed by: INTERNAL MEDICINE

## 2021-09-28 RX ORDER — FOLIC ACID 1 MG/1
1 TABLET ORAL DAILY
Qty: 90 TABLET | Refills: 3 | Status: SHIPPED | OUTPATIENT
Start: 2021-09-28

## 2021-09-28 RX ORDER — METHOTREXATE 2.5 MG/1
20 TABLET ORAL WEEKLY
Qty: 96 TABLET | Refills: 1 | Status: SHIPPED | OUTPATIENT
Start: 2021-09-28

## 2021-09-28 RX ORDER — HYDROCHLOROTHIAZIDE 12.5 MG/1
12.5 CAPSULE, GELATIN COATED ORAL DAILY
Qty: 90 CAPSULE | Refills: 3 | Status: SHIPPED | OUTPATIENT
Start: 2021-09-28

## 2021-09-28 RX ORDER — ATORVASTATIN CALCIUM 20 MG/1
20 TABLET, FILM COATED ORAL DAILY
Qty: 90 TABLET | Refills: 3 | Status: SHIPPED | OUTPATIENT
Start: 2021-09-28

## 2021-09-28 RX ORDER — CARISOPRODOL 350 MG/1
350 TABLET ORAL 2 TIMES DAILY PRN
Qty: 60 TABLET | Refills: 1 | Status: SHIPPED | OUTPATIENT
Start: 2021-09-28

## 2021-09-28 RX ORDER — LISINOPRIL 20 MG/1
20 TABLET ORAL DAILY
Qty: 90 TABLET | Refills: 3 | Status: SHIPPED | OUTPATIENT
Start: 2021-09-28

## 2021-09-28 RX ORDER — ERGOCALCIFEROL 1.25 MG/1
50000 CAPSULE ORAL WEEKLY
Qty: 12 CAPSULE | Refills: 1 | Status: SHIPPED | OUTPATIENT
Start: 2021-09-28

## 2021-09-28 RX ORDER — MECLIZINE HYDROCHLORIDE 25 MG/1
25 TABLET ORAL 3 TIMES DAILY PRN
Qty: 30 TABLET | Refills: 0 | Status: SHIPPED | OUTPATIENT
Start: 2021-09-28

## 2021-09-28 RX ORDER — HYDROXYCHLOROQUINE SULFATE 200 MG/1
TABLET, FILM COATED ORAL
Qty: 180 TABLET | Refills: 5 | Status: SHIPPED | OUTPATIENT
Start: 2021-09-28

## 2021-09-28 RX ORDER — FEXOFENADINE HCL 180 MG/1
180 TABLET ORAL DAILY PRN
Qty: 90 TABLET | Refills: 3 | Status: SHIPPED | OUTPATIENT
Start: 2021-09-28

## 2021-09-28 RX ORDER — AMLODIPINE BESYLATE 5 MG/1
5 TABLET ORAL DAILY
Qty: 90 TABLET | Refills: 3 | Status: SHIPPED | OUTPATIENT
Start: 2021-09-28

## 2021-09-28 RX ORDER — METOPROLOL SUCCINATE 25 MG/1
25 TABLET, EXTENDED RELEASE ORAL DAILY
Qty: 30 TABLET | Refills: 3 | Status: SHIPPED | OUTPATIENT
Start: 2021-09-28 | End: 2021-12-30

## 2021-09-28 RX ORDER — METFORMIN HYDROCHLORIDE 500 MG/1
500 TABLET, EXTENDED RELEASE ORAL 2 TIMES DAILY WITH MEALS
Qty: 180 TABLET | Refills: 3 | Status: SHIPPED | OUTPATIENT
Start: 2021-09-28

## 2021-09-28 RX ORDER — HYDROCODONE BITARTRATE AND ACETAMINOPHEN 10; 325 MG/1; MG/1
1 TABLET ORAL EVERY 8 HOURS PRN
Qty: 90 TABLET | Refills: 0 | Status: SHIPPED | OUTPATIENT
Start: 2021-09-28 | End: 2021-11-09 | Stop reason: ALTCHOICE

## 2021-09-28 NOTE — PROGRESS NOTES
HPI:    Patient ID: Galen Escudero is a 62year old female. Hypertension  This is a chronic problem. The current episode started more than 1 year ago. The problem has been gradually worsening since onset. The problem is uncontrolled.  Pertinent negatives a day , not sleeping well, increased stress. Heart rate up too. Current Outpatient Medications   Medication Sig Dispense Refill   • metoprolol succinate 25 MG Oral Tablet 24 Hr Take 1 tablet (25 mg total) by mouth daily.  30 tablet 3   • METHOTREXATE 1.4ML STERILE WATER. INJECT 1.2ML SEPARATELY SUBCUTANEOUSLY FROM EACH VIAL ONCE EVERY FOUR WEEKS (TOTAL=30     • PEG 3350-KCl-Na Bicarb-NaCl (TRILYTE) 420 g Oral Recon Soln As directed.  (Patient not taking: Reported on 8/2/2021 ) 1 Bottle 0   • Meclizine H MORPHINE  Penicillins             HIVES    Comment:Other reaction(s): PENICILLINS             Other reaction(s): PENICILLINS  Tramadol Hcl            HIVES    Comment:Other reaction(s): TRAMADOL HCL  Tuna Oil [Fish Oil]     ANAPHYLAXIS  Caffeine high, heart rate up, will start metoprolol 25 mg, recheck in 2 weeks. Plantar wart  Left foot, see podiatry . Pure hypercholesterolemia  Lipids controlled. No orders of the defined types were placed in this encounter.       Meds This Visit:  R

## 2021-10-12 ENCOUNTER — OFFICE VISIT (OUTPATIENT)
Dept: INTERNAL MEDICINE CLINIC | Facility: CLINIC | Age: 61
End: 2021-10-12
Payer: COMMERCIAL

## 2021-10-12 ENCOUNTER — HOSPITAL ENCOUNTER (OUTPATIENT)
Dept: GENERAL RADIOLOGY | Age: 61
Discharge: HOME | End: 2021-10-12
Attending: INTERNAL MEDICINE
Payer: COMMERCIAL

## 2021-10-12 VITALS
TEMPERATURE: 98 F | SYSTOLIC BLOOD PRESSURE: 118 MMHG | HEART RATE: 64 BPM | HEIGHT: 64 IN | DIASTOLIC BLOOD PRESSURE: 80 MMHG | WEIGHT: 167 LBS | BODY MASS INDEX: 28.51 KG/M2

## 2021-10-12 DIAGNOSIS — I10 ESSENTIAL HYPERTENSION, BENIGN: Primary | ICD-10-CM

## 2021-10-12 DIAGNOSIS — M79.672 LEFT FOOT PAIN: ICD-10-CM

## 2021-10-12 PROCEDURE — 3079F DIAST BP 80-89 MM HG: CPT | Performed by: INTERNAL MEDICINE

## 2021-10-12 PROCEDURE — 90732 PPSV23 VACC 2 YRS+ SUBQ/IM: CPT | Performed by: INTERNAL MEDICINE

## 2021-10-12 PROCEDURE — 90471 IMMUNIZATION ADMIN: CPT | Performed by: INTERNAL MEDICINE

## 2021-10-12 PROCEDURE — 99214 OFFICE O/P EST MOD 30 MIN: CPT | Performed by: INTERNAL MEDICINE

## 2021-10-12 PROCEDURE — 73630 X-RAY EXAM OF FOOT: CPT | Performed by: INTERNAL MEDICINE

## 2021-10-12 PROCEDURE — 3008F BODY MASS INDEX DOCD: CPT | Performed by: INTERNAL MEDICINE

## 2021-10-12 PROCEDURE — 3074F SYST BP LT 130 MM HG: CPT | Performed by: INTERNAL MEDICINE

## 2021-10-12 NOTE — PROGRESS NOTES
HPI:    Patient ID: Selena Ramirez is a 62year old female. HPIpatient is doing well, bp controlled at home, no side effects. Left  , lateral dorsal surface, she had twisted it . Has an appointment with podiatry .      Review of Systems   Con HYDROcodone-acetaminophen  MG Oral Tab Take 1 tablet by mouth every 8 (eight) hours as needed for Pain.  90 tablet 0   • Hydroxychloroquine Sulfate 200 MG Oral Tab TAKE 1 TABLET BY MOUTH TWICE A  tablet 5   • lisinopril 20 MG Oral Tab Take 1 ta Recon Soln RECONSTITUTE EACH OF 2 VIALS WITH 1.4ML STERILE WATER.  INJECT 1.2ML SEPARATELY SUBCUTANEOUSLY FROM EACH VIAL ONCE EVERY FOUR WEEKS (TOTAL=30     • Glucose Blood (RA TRUETEST TEST) In Vitro Strip take by Intradermal route check  glucose 3 times a Eyes:      Pupils: Pupils are equal, round, and reactive to light. Neck:      Thyroid: No thyromegaly. Cardiovascular:      Rate and Rhythm: Normal rate and regular rhythm. Heart sounds: Normal heart sounds. No murmur heard. No gallop.     Pulmo

## 2021-10-19 ENCOUNTER — TELEPHONE (OUTPATIENT)
Dept: INTERNAL MEDICINE CLINIC | Facility: CLINIC | Age: 61
End: 2021-10-19

## 2021-10-20 NOTE — TELEPHONE ENCOUNTER
Patient was notified yesterday to wear an orthopedic boot until her appointment with podiatry . She has the boot .

## 2021-10-26 ENCOUNTER — OFFICE VISIT (OUTPATIENT)
Dept: PODIATRY CLINIC | Facility: CLINIC | Age: 61
End: 2021-10-26
Payer: COMMERCIAL

## 2021-10-26 DIAGNOSIS — L84 FOOT CALLUS: ICD-10-CM

## 2021-10-26 DIAGNOSIS — M79.672 LEFT FOOT PAIN: ICD-10-CM

## 2021-10-26 DIAGNOSIS — S92.355A NONDISPLACED FRACTURE OF FIFTH METATARSAL BONE, LEFT FOOT, INITIAL ENCOUNTER FOR CLOSED FRACTURE: Primary | ICD-10-CM

## 2021-10-26 DIAGNOSIS — L98.9 BENIGN SKIN LESION: ICD-10-CM

## 2021-10-26 PROCEDURE — L4386 NON-PNEUM WALK BOOT PRE CST: HCPCS | Performed by: PODIATRIST

## 2021-10-26 PROCEDURE — 99204 OFFICE O/P NEW MOD 45 MIN: CPT | Performed by: PODIATRIST
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
## 2021-11-01 ENCOUNTER — TELEPHONE (OUTPATIENT)
Dept: INTERNAL MEDICINE CLINIC | Facility: CLINIC | Age: 61
End: 2021-11-01

## 2021-11-05 ENCOUNTER — HOSPITAL ENCOUNTER (OUTPATIENT)
Dept: GENERAL RADIOLOGY | Age: 61
Discharge: HOME | End: 2021-11-05
Attending: PODIATRIST
Payer: COMMERCIAL

## 2021-11-05 DIAGNOSIS — M79.672 LEFT FOOT PAIN: ICD-10-CM

## 2021-11-05 DIAGNOSIS — S92.355A NONDISPLACED FRACTURE OF FIFTH METATARSAL BONE, LEFT FOOT, INITIAL ENCOUNTER FOR CLOSED FRACTURE: ICD-10-CM

## 2021-11-05 PROCEDURE — 73630 X-RAY EXAM OF FOOT: CPT | Performed by: PODIATRIST

## 2021-11-09 ENCOUNTER — OFFICE VISIT (OUTPATIENT)
Dept: PODIATRY CLINIC | Facility: CLINIC | Age: 61
End: 2021-11-09
Payer: COMMERCIAL

## 2021-11-09 VITALS — WEIGHT: 167 LBS | BODY MASS INDEX: 29 KG/M2

## 2021-11-09 DIAGNOSIS — M79.672 LEFT FOOT PAIN: ICD-10-CM

## 2021-11-09 DIAGNOSIS — L84 FOOT CALLUS: ICD-10-CM

## 2021-11-09 DIAGNOSIS — L98.9 BENIGN SKIN LESION: ICD-10-CM

## 2021-11-09 DIAGNOSIS — S92.355D NONDISPLACED FRACTURE OF FIFTH METATARSAL BONE, LEFT FOOT, SUBSEQUENT ENCOUNTER FOR FRACTURE WITH ROUTINE HEALING: Primary | ICD-10-CM

## 2021-11-09 PROCEDURE — 99212 OFFICE O/P EST SF 10 MIN: CPT | Performed by: PODIATRIST

## 2021-11-09 RX ORDER — OMALIZUMAB 150 MG/ML
INJECTION, SOLUTION SUBCUTANEOUS
COMMUNITY
Start: 2021-10-28
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
## 2021-11-16 NOTE — TELEPHONE ENCOUNTER
Khadijah Rawls needs a refill of:    - One Touch Verio Test Strips    - One Tough 30J [facility]    **Mail order, 3 month supply**

## 2021-11-19 RX ORDER — BLOOD SUGAR DIAGNOSTIC
STRIP MISCELLANEOUS
Qty: 50 STRIP | Refills: 3 | Status: SHIPPED | OUTPATIENT
Start: 2021-11-19

## 2021-11-19 RX ORDER — LANCETS 30 GAUGE
EACH MISCELLANEOUS
Qty: 50 EACH | Refills: 3 | Status: SHIPPED | OUTPATIENT
Start: 2021-11-19

## 2021-11-19 NOTE — TELEPHONE ENCOUNTER
Refill passed per [facility] protocol.       Requested Prescriptions   Pending Prescriptions Disp Refills    Glucose Blood (ONETOUCH VERIO) In Vitro Strip 50 strip 3     Sig: Check blood sugar 3 times a week        Diabetic Supplies Protocol Passed - 1

## 2021-12-01 ENCOUNTER — TELEPHONE (OUTPATIENT)
Dept: GASTROENTEROLOGY | Facility: CLINIC | Age: 61
End: 2021-12-01

## 2021-12-01 NOTE — TELEPHONE ENCOUNTER
Cancelled for:  Colonoscopy 08326   Provider Name:  Dr. Joaquín Kitchen  Date:  12/3/21  Location:  [facility]   Sedation:  Mac   Time: 0945   Prep: [place],, Mag citrate 1 week prior to procedure, then miralax daily one capful in AM and PM.   Buy over the counter dulcolax

## 2021-12-06 ENCOUNTER — HOSPITAL ENCOUNTER (OUTPATIENT)
Dept: GENERAL RADIOLOGY | Age: 61
Discharge: HOME | End: 2021-12-06
Attending: PODIATRIST
Payer: COMMERCIAL

## 2021-12-06 DIAGNOSIS — M79.672 LEFT FOOT PAIN: ICD-10-CM

## 2021-12-06 DIAGNOSIS — S92.355D NONDISPLACED FRACTURE OF FIFTH METATARSAL BONE, LEFT FOOT, SUBSEQUENT ENCOUNTER FOR FRACTURE WITH ROUTINE HEALING: ICD-10-CM

## 2021-12-06 PROCEDURE — 73630 X-RAY EXAM OF FOOT: CPT | Performed by: PODIATRIST

## 2021-12-07 ENCOUNTER — OFFICE VISIT (OUTPATIENT)
Dept: PODIATRY CLINIC | Facility: CLINIC | Age: 61
End: 2021-12-07
Payer: COMMERCIAL

## 2021-12-07 DIAGNOSIS — S92.355G NONDISPLACED FRACTURE OF FIFTH METATARSAL BONE, LEFT FOOT, SUBSEQUENT ENCOUNTER FOR FRACTURE WITH DELAYED HEALING: Primary | ICD-10-CM

## 2021-12-07 DIAGNOSIS — M79.672 LEFT FOOT PAIN: ICD-10-CM

## 2021-12-07 DIAGNOSIS — L84 FOOT CALLUS: ICD-10-CM

## 2021-12-07 DIAGNOSIS — M77.42 METATARSALGIA, LEFT FOOT: ICD-10-CM

## 2021-12-07 DIAGNOSIS — L98.9 BENIGN SKIN LESION: ICD-10-CM

## 2021-12-07 PROCEDURE — 99213 OFFICE O/P EST LOW 20 MIN: CPT | Performed by: PODIATRIST

## 2021-12-07 NOTE — PROGRESS NOTES
[facility], [facility] Podiatry  Progress Note    Jaimee Holguin is a 62year old female. Patient presents with:   Follow - Up: left foot fracture, 8/10 pain scale        HPI:     This is a pleasant well controlled diabetic female with RA on methotrexate and does a (20 mg total) by mouth daily. 90 tablet 3   • metFORMIN HCl  MG Oral Tablet 24 Hr Take 1 tablet (500 mg total) by mouth 2 (two) times daily with meals.  180 tablet 3   • Methotrexate Sodium 2.5 MG Oral Tab Take 8 tablets (20 mg total) by mouth once a mention of complication, not stated as uncontrolled    • Unspecified essential hypertension    • Vertigo       Past Surgical History:   Procedure Laterality Date   • APPENDECTOMY     • BOWEL RESECTION     • LYSIS OF ADHESIONS  1999   • OTHER SURGICAL HISTO 07/12/2021    BUN 17 07/12/2021    CREATSERUM 0.89 07/12/2021    BUNCREA 19.1 07/12/2021    ANIONGAP 4 07/12/2021    GFRAA 81 07/12/2021    GFRNAA 71 07/12/2021    CA 9.4 07/12/2021     07/12/2021    K 3.6 07/12/2021     07/12/2021    CO2 30.0 excision. Pt is more interested in excision but wants to hold off at this time. Pt will check for orthotic coverage    Due to concern for nonunion of the Left 5th metatarsal fracture, ordered bone stimulator.   Will most likely be covered after 90 days

## 2021-12-10 ENCOUNTER — TELEPHONE (OUTPATIENT)
Dept: PODIATRY CLINIC | Facility: CLINIC | Age: 61
End: 2021-12-10

## 2021-12-10 NOTE — TELEPHONE ENCOUNTER
darion from [employer] called. Received fax. Missing information. Need pt's demographics and insurance.   Fax number [phone]

## 2021-12-14 NOTE — TELEPHONE ENCOUNTER
Patient demographics and insurance faxed to [phone] as requested. No further action required at this time.

## 2021-12-21 ENCOUNTER — OFFICE VISIT (OUTPATIENT)
Dept: INTERNAL MEDICINE CLINIC | Facility: CLINIC | Age: 61
End: 2021-12-21
Payer: COMMERCIAL

## 2021-12-21 VITALS
WEIGHT: 172 LBS | HEIGHT: 64 IN | HEART RATE: 60 BPM | DIASTOLIC BLOOD PRESSURE: 78 MMHG | BODY MASS INDEX: 29.37 KG/M2 | SYSTOLIC BLOOD PRESSURE: 120 MMHG | TEMPERATURE: 98 F

## 2021-12-21 DIAGNOSIS — Z12.31 VISIT FOR SCREENING MAMMOGRAM: ICD-10-CM

## 2021-12-21 DIAGNOSIS — I10 ESSENTIAL HYPERTENSION, BENIGN: Primary | ICD-10-CM

## 2021-12-21 DIAGNOSIS — M79.672 LEFT FOOT PAIN: ICD-10-CM

## 2021-12-21 DIAGNOSIS — E11.9 DIABETES MELLITUS WITHOUT COMPLICATION (HCC): ICD-10-CM

## 2021-12-21 PROCEDURE — 3008F BODY MASS INDEX DOCD: CPT | Performed by: INTERNAL MEDICINE

## 2021-12-21 PROCEDURE — 3074F SYST BP LT 130 MM HG: CPT | Performed by: INTERNAL MEDICINE

## 2021-12-21 PROCEDURE — 99214 OFFICE O/P EST MOD 30 MIN: CPT | Performed by: INTERNAL MEDICINE

## 2021-12-21 PROCEDURE — 3078F DIAST BP <80 MM HG: CPT | Performed by: INTERNAL MEDICINE

## 2021-12-21 RX ORDER — DOXYCYCLINE HYCLATE 100 MG/1
100 CAPSULE ORAL 2 TIMES DAILY
Qty: 14 CAPSULE | Refills: 0 | Status: SHIPPED | OUTPATIENT
Start: 2021-12-21

## 2021-12-21 NOTE — PROGRESS NOTES
Hypertension  This is a chronic problem. The current episode started more than 1 year ago. The problem has been gradually worsening since onset. The problem is uncontrolled.  Pertinent negatives include no anxiety, blurred vision, chest pain, headaches, m Psychiatric/Behavioral: Negative. Current Outpatient Medications   Medication Sig Dispense Refill   • doxycycline 100 MG Oral Cap Take 1 capsule (100 mg total) by mouth 2 (two) times daily.  14 capsule 0   • metoprolol succinate 25 MG Oral Tab (ONETOUCH VERIO) In Vitro Strip Check blood sugar 3 times a week 50 strip 3   • Lancets Does not apply Misc Check blood sugar 3 times a week 50 each 3   • XOLAIR 150 MG/ML Subcutaneous Solution Prefilled Syringe      • meclizine 25 MG Oral Tab Take 1 table Comment:Other reaction(s): MORPHINE  Penicillins             HIVES    Comment:Other reaction(s): PENICILLINS             Other reaction(s): PENICILLINS  Tramadol Hcl            HIVES    Comment:Other reaction(s): TRAMADOL HCL  Tuna Oil [Fish Oil]     ANAPH benign  bp is controlled. Left foot pain  Paronychia 4 th toe, doxycycline given . Visit for screening mammogram  mammo in March 2022, order given . No orders of the defined types were placed in this encounter.       Meds This Visit:  Requeste

## 2021-12-30 RX ORDER — METOPROLOL SUCCINATE 25 MG/1
TABLET, EXTENDED RELEASE ORAL
Qty: 90 TABLET | Refills: 1 | Status: SHIPPED | OUTPATIENT
Start: 2021-12-30

## 2022-01-18 ENCOUNTER — OFFICE VISIT (OUTPATIENT)
Dept: PODIATRY CLINIC | Facility: CLINIC | Age: 62
End: 2022-01-18
Payer: COMMERCIAL

## 2022-01-18 ENCOUNTER — HOSPITAL ENCOUNTER (OUTPATIENT)
Dept: GENERAL RADIOLOGY | Age: 62
Discharge: HOME | End: 2022-01-18
Attending: PODIATRIST
Payer: COMMERCIAL

## 2022-01-18 DIAGNOSIS — S92.355G NONDISPLACED FRACTURE OF FIFTH METATARSAL BONE, LEFT FOOT, SUBSEQUENT ENCOUNTER FOR FRACTURE WITH DELAYED HEALING: ICD-10-CM

## 2022-01-18 DIAGNOSIS — S92.355K: Primary | ICD-10-CM

## 2022-01-18 DIAGNOSIS — M79.672 LEFT FOOT PAIN: ICD-10-CM

## 2022-01-18 PROCEDURE — 73630 X-RAY EXAM OF FOOT: CPT | Performed by: PODIATRIST

## 2022-01-18 PROCEDURE — 99213 OFFICE O/P EST LOW 20 MIN: CPT | Performed by: PODIATRIST

## 2022-01-18 RX ORDER — HYDROCODONE BITARTRATE AND ACETAMINOPHEN 10; 325 MG/1; MG/1
TABLET ORAL
COMMUNITY
Start: 2022-01-14
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
## 2022-01-25 ENCOUNTER — TELEPHONE (OUTPATIENT)
Dept: INTERNAL MEDICINE CLINIC | Facility: CLINIC | Age: 62
End: 2022-01-25

## 2022-01-25 NOTE — TELEPHONE ENCOUNTER
Patient calling with complaint of left foot pain rated a 9.5/10.    Patient states she was previously receiving [place] from podiatrist.   Patient states [facility] not helping and states Dr. Arlene Vargas gave her another pain medication in November 2021 when she firs

## 2022-01-27 NOTE — TELEPHONE ENCOUNTER
I reviewed the chart, I had only referred her to podiatry in Sept, Oct. And Nov.   I did not give her pain meds. Does she want to confirm with her pharmacy?

## 2022-01-27 NOTE — TELEPHONE ENCOUNTER
Patient calling, identified name and , asking to speak with Lakhwinder Lujan at [facility]     Call transferred

## 2022-02-04 ENCOUNTER — TELEPHONE (OUTPATIENT)
Dept: INTERNAL MEDICINE CLINIC | Facility: CLINIC | Age: 62
End: 2022-02-04

## 2022-02-04 RX ORDER — MECLIZINE HCL 12.5 MG/1
12.5 TABLET ORAL 3 TIMES DAILY PRN
Qty: 30 TABLET | Refills: 1 | Status: SHIPPED | OUTPATIENT
Start: 2022-02-04

## 2022-02-04 NOTE — TELEPHONE ENCOUNTER
Spoke to [employer] D at [facility]. Asked cost of medication. She said it is $12.69. Updated patient and she can afford that and will pick it up. Dr. Kamran De La Cruz, please disregard last message.
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
## 2022-02-04 NOTE — TELEPHONE ENCOUNTER
Spoke to patient. She just got a text message from her pharmacy that Meclizine is not covered by her insurance. Dr. Sorto Son, any alternatives?
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
## 2022-02-15 ENCOUNTER — TELEPHONE (OUTPATIENT)
Dept: PODIATRY CLINIC | Facility: CLINIC | Age: 62
End: 2022-02-15

## 2022-03-15 ENCOUNTER — OFFICE VISIT (OUTPATIENT)
Dept: INTERNAL MEDICINE CLINIC | Facility: CLINIC | Age: 62
End: 2022-03-15
Payer: COMMERCIAL

## 2022-03-15 ENCOUNTER — HOSPITAL ENCOUNTER (OUTPATIENT)
Dept: GENERAL RADIOLOGY | Age: 62
Discharge: HOME | End: 2022-03-15
Attending: PODIATRIST
Payer: COMMERCIAL

## 2022-03-15 VITALS
WEIGHT: 174 LBS | HEART RATE: 80 BPM | HEIGHT: 64 IN | TEMPERATURE: 97 F | BODY MASS INDEX: 29.71 KG/M2 | SYSTOLIC BLOOD PRESSURE: 110 MMHG | DIASTOLIC BLOOD PRESSURE: 74 MMHG

## 2022-03-15 DIAGNOSIS — E11.9 DIABETES MELLITUS WITHOUT COMPLICATION (HCC): ICD-10-CM

## 2022-03-15 DIAGNOSIS — I10 ESSENTIAL HYPERTENSION, BENIGN: Primary | ICD-10-CM

## 2022-03-15 DIAGNOSIS — Z47.89 ORTHOPEDIC AFTERCARE: ICD-10-CM

## 2022-03-15 DIAGNOSIS — E78.00 PURE HYPERCHOLESTEROLEMIA: ICD-10-CM

## 2022-03-15 PROCEDURE — 3008F BODY MASS INDEX DOCD: CPT | Performed by: INTERNAL MEDICINE

## 2022-03-15 PROCEDURE — 99214 OFFICE O/P EST MOD 30 MIN: CPT | Performed by: INTERNAL MEDICINE

## 2022-03-15 PROCEDURE — 3074F SYST BP LT 130 MM HG: CPT | Performed by: INTERNAL MEDICINE

## 2022-03-15 PROCEDURE — 73630 X-RAY EXAM OF FOOT: CPT | Performed by: PODIATRIST

## 2022-03-15 PROCEDURE — 3078F DIAST BP <80 MM HG: CPT | Performed by: INTERNAL MEDICINE

## 2022-03-22 RX ORDER — METHOTREXATE 2.5 MG/1
TABLET ORAL
Qty: 96 TABLET | Refills: 1 | Status: SHIPPED | OUTPATIENT
Start: 2022-03-22

## 2022-03-24 ENCOUNTER — TELEPHONE (OUTPATIENT)
Dept: INTERNAL MEDICINE CLINIC | Facility: CLINIC | Age: 62
End: 2022-03-24

## 2022-03-24 RX ORDER — HYDROCODONE BITARTRATE AND ACETAMINOPHEN 10; 325 MG/1; MG/1
1 TABLET ORAL EVERY 8 HOURS PRN
Qty: 90 TABLET | Refills: 0 | Status: SHIPPED | OUTPATIENT
Start: 2022-03-24

## 2022-03-25 ENCOUNTER — TELEPHONE (OUTPATIENT)
Dept: INTERNAL MEDICINE CLINIC | Facility: CLINIC | Age: 62
End: 2022-03-25

## 2022-03-29 PROBLEM — F32.0 CURRENT MILD EPISODE OF MAJOR DEPRESSIVE DISORDER WITHOUT PRIOR EPISODE: Status: ACTIVE | Noted: 2022-03-29

## 2022-03-29 PROBLEM — F32.0 CURRENT MILD EPISODE OF MAJOR DEPRESSIVE DISORDER WITHOUT PRIOR EPISODE (HCC): Status: ACTIVE | Noted: 2022-03-29

## 2022-03-31 ENCOUNTER — TELEPHONE (OUTPATIENT)
Dept: INTERNAL MEDICINE CLINIC | Facility: CLINIC | Age: 62
End: 2022-03-31

## 2022-03-31 PROBLEM — N60.11 FIBROCYSTIC DISEASE OF RIGHT BREAST: Status: ACTIVE | Noted: 2022-03-31

## 2022-04-13 RX ORDER — ERGOCALCIFEROL 1.25 MG/1
CAPSULE ORAL
Qty: 12 CAPSULE | Refills: 1 | OUTPATIENT
Start: 2022-04-13

## 2022-04-25 ENCOUNTER — TELEPHONE (OUTPATIENT)
Dept: INTERNAL MEDICINE CLINIC | Facility: CLINIC | Age: 62
End: 2022-04-25

## 2022-04-25 NOTE — TELEPHONE ENCOUNTER
Patient asks that we fax a copy of the written report of the xray of her left foot to Attention: Florencejarek Mcelroya [phone] so she can get a bone stimulator.

## 2022-04-26 ENCOUNTER — TELEPHONE (OUTPATIENT)
Dept: INTERNAL MEDICINE CLINIC | Facility: CLINIC | Age: 62
End: 2022-04-26

## 2022-04-26 NOTE — TELEPHONE ENCOUNTER
Valarie Alves [facility] [employer] [facility] is calling to advise PCP the patient is being recommended an ultrasound core biopsy of right breast and also for stereo core biopsy of right breast.     Please call back at [phone]

## 2022-05-24 ENCOUNTER — OFFICE VISIT (OUTPATIENT)
Dept: PODIATRY CLINIC | Facility: CLINIC | Age: 62
End: 2022-05-24
Payer: COMMERCIAL

## 2022-05-24 DIAGNOSIS — M79.672 LEFT FOOT PAIN: ICD-10-CM

## 2022-05-24 DIAGNOSIS — S92.355G NONDISPLACED FRACTURE OF FIFTH METATARSAL BONE, LEFT FOOT, SUBSEQUENT ENCOUNTER FOR FRACTURE WITH DELAYED HEALING: Primary | ICD-10-CM

## 2022-05-24 PROCEDURE — 99212 OFFICE O/P EST SF 10 MIN: CPT | Performed by: PODIATRIST

## 2022-07-06 RX ORDER — ERGOCALCIFEROL 1.25 MG/1
CAPSULE ORAL
Qty: 12 CAPSULE | Refills: 1 | Status: SHIPPED | OUTPATIENT
Start: 2022-07-06

## 2022-07-18 ENCOUNTER — MED REC SCAN ONLY (OUTPATIENT)
Dept: INTERNAL MEDICINE CLINIC | Facility: CLINIC | Age: 62
End: 2022-07-18

## 2022-07-19 ENCOUNTER — HOSPITAL ENCOUNTER (OUTPATIENT)
Dept: GENERAL RADIOLOGY | Age: 62
Discharge: HOME | End: 2022-07-19
Attending: PODIATRIST
Payer: COMMERCIAL

## 2022-07-19 ENCOUNTER — OFFICE VISIT (OUTPATIENT)
Dept: PODIATRY CLINIC | Facility: CLINIC | Age: 62
End: 2022-07-19
Payer: COMMERCIAL

## 2022-07-19 DIAGNOSIS — M79.672 LEFT FOOT PAIN: ICD-10-CM

## 2022-07-19 DIAGNOSIS — S92.355G NONDISPLACED FRACTURE OF FIFTH METATARSAL BONE, LEFT FOOT, SUBSEQUENT ENCOUNTER FOR FRACTURE WITH DELAYED HEALING: Primary | ICD-10-CM

## 2022-07-19 DIAGNOSIS — S92.355G NONDISPLACED FRACTURE OF FIFTH METATARSAL BONE, LEFT FOOT, SUBSEQUENT ENCOUNTER FOR FRACTURE WITH DELAYED HEALING: ICD-10-CM

## 2022-07-19 PROCEDURE — 73630 X-RAY EXAM OF FOOT: CPT | Performed by: PODIATRIST

## 2022-07-19 PROCEDURE — 99212 OFFICE O/P EST SF 10 MIN: CPT | Performed by: PODIATRIST

## 2022-07-26 ENCOUNTER — TELEPHONE (OUTPATIENT)
Dept: INTERNAL MEDICINE CLINIC | Facility: CLINIC | Age: 62
End: 2022-07-26

## 2022-07-26 NOTE — TELEPHONE ENCOUNTER
Future Appointments   Date Time Provider Hermelindo Bonilla   8/2/2022  8:00 AM [facility]

## 2022-07-26 NOTE — TELEPHONE ENCOUNTER
S/w pt and she states she was not calling about her left foot XR done on 7/19 with Dr. Justice Camara. She was calling about other XR results that she did not want to disclose. She states she has an appt with her pcp next wk and will f/u with them regarding her q's at that time.

## 2022-08-02 ENCOUNTER — LAB ENCOUNTER (OUTPATIENT)
Dept: LAB | Age: 62
End: 2022-08-02
Attending: INTERNAL MEDICINE
Payer: COMMERCIAL

## 2022-08-02 ENCOUNTER — OFFICE VISIT (OUTPATIENT)
Dept: INTERNAL MEDICINE CLINIC | Facility: CLINIC | Age: 62
End: 2022-08-02
Payer: COMMERCIAL

## 2022-08-02 VITALS
BODY MASS INDEX: 29.19 KG/M2 | HEIGHT: 64 IN | WEIGHT: 171 LBS | TEMPERATURE: 98 F | HEART RATE: 80 BPM | DIASTOLIC BLOOD PRESSURE: 68 MMHG | SYSTOLIC BLOOD PRESSURE: 110 MMHG

## 2022-08-02 DIAGNOSIS — M05.771 RHEUMATOID ARTHRITIS INVOLVING BOTH FEET WITH POSITIVE RHEUMATOID FACTOR (HCC): ICD-10-CM

## 2022-08-02 DIAGNOSIS — E11.9 DIABETES MELLITUS WITHOUT COMPLICATION (HCC): ICD-10-CM

## 2022-08-02 DIAGNOSIS — I10 ESSENTIAL HYPERTENSION, BENIGN: ICD-10-CM

## 2022-08-02 DIAGNOSIS — E78.00 PURE HYPERCHOLESTEROLEMIA: ICD-10-CM

## 2022-08-02 DIAGNOSIS — M05.772 RHEUMATOID ARTHRITIS INVOLVING BOTH FEET WITH POSITIVE RHEUMATOID FACTOR (HCC): ICD-10-CM

## 2022-08-02 DIAGNOSIS — Z12.11 COLON CANCER SCREENING: ICD-10-CM

## 2022-08-02 DIAGNOSIS — Z00.00 ANNUAL PHYSICAL EXAM: Primary | ICD-10-CM

## 2022-08-02 LAB
ALBUMIN SERPL-MCNC: 3.9 G/DL (ref 3.4–5)
ALBUMIN/GLOB SERPL: 1.3 {RATIO} (ref 1–2)
ALP LIVER SERPL-CCNC: 62 U/L
ALT SERPL-CCNC: 33 U/L
ANION GAP SERPL CALC-SCNC: 4 MMOL/L (ref 0–18)
AST SERPL-CCNC: 22 U/L (ref 15–37)
BASOPHILS # BLD AUTO: 0.03 X10(3) UL (ref 0–0.2)
BASOPHILS NFR BLD AUTO: 0.8 %
BILIRUB SERPL-MCNC: 0.6 MG/DL (ref 0.1–2)
BUN BLD-MCNC: 13 MG/DL (ref 7–18)
BUN/CREAT SERPL: 15.5 (ref 10–20)
CALCIUM BLD-MCNC: 9 MG/DL (ref 8.5–10.1)
CHLORIDE SERPL-SCNC: 107 MMOL/L (ref 98–112)
CHOLEST SERPL-MCNC: 143 MG/DL (ref ?–200)
CO2 SERPL-SCNC: 31 MMOL/L (ref 21–32)
CREAT BLD-MCNC: 0.84 MG/DL
CREAT UR-SCNC: 188 MG/DL
DEPRECATED RDW RBC AUTO: 47.8 FL (ref 35.1–46.3)
EOSINOPHIL # BLD AUTO: 0.09 X10(3) UL (ref 0–0.7)
EOSINOPHIL NFR BLD AUTO: 2.3 %
ERYTHROCYTE [DISTWIDTH] IN BLOOD BY AUTOMATED COUNT: 13.3 % (ref 11–15)
ERYTHROCYTE [SEDIMENTATION RATE] IN BLOOD: 9 MM/HR
EST. AVERAGE GLUCOSE BLD GHB EST-MCNC: 111 MG/DL (ref 68–126)
FASTING PATIENT LIPID ANSWER: YES
FASTING STATUS PATIENT QL REPORTED: YES
GFR SERPLBLD BASED ON 1.73 SQ M-ARVRAT: 79 ML/MIN/1.73M2 (ref 60–?)
GLOBULIN PLAS-MCNC: 3.1 G/DL (ref 2.8–4.4)
GLUCOSE BLD-MCNC: 85 MG/DL (ref 70–99)
HBA1C MFR BLD: 5.5 % (ref ?–5.7)
HCT VFR BLD AUTO: 36.6 %
HDLC SERPL-MCNC: 71 MG/DL (ref 40–59)
HGB BLD-MCNC: 11.5 G/DL
IMM GRANULOCYTES # BLD AUTO: 0.03 X10(3) UL (ref 0–1)
IMM GRANULOCYTES NFR BLD: 0.8 %
LDLC SERPL CALC-MCNC: 53 MG/DL (ref ?–100)
LYMPHOCYTES # BLD AUTO: 1.34 X10(3) UL (ref 1–4)
LYMPHOCYTES NFR BLD AUTO: 34.3 %
MCH RBC QN AUTO: 31 PG (ref 26–34)
MCHC RBC AUTO-ENTMCNC: 31.4 G/DL (ref 31–37)
MCV RBC AUTO: 98.7 FL
MICROALBUMIN UR-MCNC: 1.85 MG/DL
MICROALBUMIN/CREAT 24H UR-RTO: 9.8 UG/MG (ref ?–30)
MONOCYTES # BLD AUTO: 0.47 X10(3) UL (ref 0.1–1)
MONOCYTES NFR BLD AUTO: 12 %
NEUTROPHILS # BLD AUTO: 1.95 X10 (3) UL (ref 1.5–7.7)
NEUTROPHILS # BLD AUTO: 1.95 X10(3) UL (ref 1.5–7.7)
NEUTROPHILS NFR BLD AUTO: 49.8 %
NONHDLC SERPL-MCNC: 72 MG/DL (ref ?–130)
OSMOLALITY SERPL CALC.SUM OF ELEC: 293 MOSM/KG (ref 275–295)
PLATELET # BLD AUTO: 202 10(3)UL (ref 150–450)
POTASSIUM SERPL-SCNC: 3.7 MMOL/L (ref 3.5–5.1)
PROT SERPL-MCNC: 7 G/DL (ref 6.4–8.2)
RBC # BLD AUTO: 3.71 X10(6)UL
SODIUM SERPL-SCNC: 142 MMOL/L (ref 136–145)
T4 FREE SERPL-MCNC: 0.9 NG/DL (ref 0.8–1.7)
TRIGL SERPL-MCNC: 108 MG/DL (ref 30–149)
TSI SER-ACNC: 0.82 MIU/ML (ref 0.36–3.74)
VLDLC SERPL CALC-MCNC: 16 MG/DL (ref 0–30)
WBC # BLD AUTO: 3.9 X10(3) UL (ref 4–11)

## 2022-08-02 PROCEDURE — 80053 COMPREHEN METABOLIC PANEL: CPT

## 2022-08-02 PROCEDURE — 80061 LIPID PANEL: CPT

## 2022-08-02 PROCEDURE — 82570 ASSAY OF URINE CREATININE: CPT

## 2022-08-02 PROCEDURE — 3078F DIAST BP <80 MM HG: CPT | Performed by: INTERNAL MEDICINE

## 2022-08-02 PROCEDURE — 85025 COMPLETE CBC W/AUTO DIFF WBC: CPT

## 2022-08-02 PROCEDURE — 84443 ASSAY THYROID STIM HORMONE: CPT

## 2022-08-02 PROCEDURE — 99214 OFFICE O/P EST MOD 30 MIN: CPT | Performed by: INTERNAL MEDICINE

## 2022-08-02 PROCEDURE — 82043 UR ALBUMIN QUANTITATIVE: CPT

## 2022-08-02 PROCEDURE — 84439 ASSAY OF FREE THYROXINE: CPT

## 2022-08-02 PROCEDURE — 3074F SYST BP LT 130 MM HG: CPT | Performed by: INTERNAL MEDICINE

## 2022-08-02 PROCEDURE — 83036 HEMOGLOBIN GLYCOSYLATED A1C: CPT | Performed by: INTERNAL MEDICINE

## 2022-08-02 PROCEDURE — 85652 RBC SED RATE AUTOMATED: CPT

## 2022-08-02 PROCEDURE — 36415 COLL VENOUS BLD VENIPUNCTURE: CPT

## 2022-08-02 PROCEDURE — 3044F HG A1C LEVEL LT 7.0%: CPT | Performed by: INTERNAL MEDICINE

## 2022-08-02 PROCEDURE — 3008F BODY MASS INDEX DOCD: CPT | Performed by: INTERNAL MEDICINE

## 2022-08-02 RX ORDER — METHOTREXATE 2.5 MG/1
20 TABLET ORAL WEEKLY
Qty: 96 TABLET | Refills: 0 | Status: SHIPPED | OUTPATIENT
Start: 2022-08-02

## 2022-08-02 NOTE — ASSESSMENT & PLAN NOTE
Physical today   Had mammo and biopsy in July   Sees ophtho this month  cologard  Labs today   Will discuss [employer] next visit.

## 2022-09-09 LAB — AMB EXT COLOGUARD RESULT: NEGATIVE

## 2022-09-15 ENCOUNTER — TELEPHONE (OUTPATIENT)
Dept: INTERNAL MEDICINE CLINIC | Facility: CLINIC | Age: 62
End: 2022-09-15

## 2022-10-02 RX ORDER — AMLODIPINE BESYLATE 5 MG/1
TABLET ORAL
Qty: 90 TABLET | Refills: 3 | OUTPATIENT
Start: 2022-10-02

## 2022-10-21 NOTE — TELEPHONE ENCOUNTER
Patient canceled procedure because no BM  She has constipation and blood per rectum with drop in Hgb  She was not able to successfully prep-- please schedule with extended 3 day prep and CLD for a few days before [de-identified] : Chief Complaints\par Patient Complaint - Pain posterior and on right side of her neck and shoulder also pain in her low back\par Now retired reports still difficulty turning her neck and pain in the back is still present she still takes the medication with\par good effectiveness continues to see pain management had RFA in the cervical spine in May really did  help \par did get a recent cervical MRI 06/11/2021 does have follow-up with pain management thinks it is time to get another\par injection

## 2022-12-13 ENCOUNTER — OFFICE VISIT (OUTPATIENT)
Dept: INTERNAL MEDICINE CLINIC | Facility: CLINIC | Age: 62
End: 2022-12-13
Payer: COMMERCIAL

## 2022-12-13 VITALS
DIASTOLIC BLOOD PRESSURE: 80 MMHG | BODY MASS INDEX: 30.05 KG/M2 | WEIGHT: 176 LBS | TEMPERATURE: 97 F | HEIGHT: 64 IN | SYSTOLIC BLOOD PRESSURE: 120 MMHG | HEART RATE: 80 BPM

## 2022-12-13 DIAGNOSIS — M05.772 RHEUMATOID ARTHRITIS INVOLVING BOTH FEET WITH POSITIVE RHEUMATOID FACTOR (HCC): ICD-10-CM

## 2022-12-13 DIAGNOSIS — E78.00 PURE HYPERCHOLESTEROLEMIA: ICD-10-CM

## 2022-12-13 DIAGNOSIS — M05.771 RHEUMATOID ARTHRITIS INVOLVING BOTH FEET WITH POSITIVE RHEUMATOID FACTOR (HCC): ICD-10-CM

## 2022-12-13 DIAGNOSIS — E11.9 DIABETES MELLITUS WITHOUT COMPLICATION (HCC): ICD-10-CM

## 2022-12-13 DIAGNOSIS — I10 ESSENTIAL HYPERTENSION, BENIGN: Primary | ICD-10-CM

## 2022-12-13 PROCEDURE — 99214 OFFICE O/P EST MOD 30 MIN: CPT | Performed by: INTERNAL MEDICINE

## 2022-12-13 PROCEDURE — 3079F DIAST BP 80-89 MM HG: CPT | Performed by: INTERNAL MEDICINE

## 2022-12-13 PROCEDURE — 3008F BODY MASS INDEX DOCD: CPT | Performed by: INTERNAL MEDICINE

## 2022-12-13 PROCEDURE — 3074F SYST BP LT 130 MM HG: CPT | Performed by: INTERNAL MEDICINE

## 2022-12-13 RX ORDER — LISINOPRIL 20 MG/1
20 TABLET ORAL DAILY
Qty: 90 TABLET | Refills: 3 | Status: SHIPPED | OUTPATIENT
Start: 2022-12-13

## 2022-12-13 RX ORDER — AMLODIPINE BESYLATE 5 MG/1
5 TABLET ORAL DAILY
Qty: 90 TABLET | Refills: 3 | Status: SHIPPED | OUTPATIENT
Start: 2022-12-13

## 2022-12-13 RX ORDER — CARISOPRODOL 350 MG/1
350 TABLET ORAL 2 TIMES DAILY PRN
Qty: 60 TABLET | Refills: 1 | Status: SHIPPED | OUTPATIENT
Start: 2022-12-13

## 2022-12-13 RX ORDER — ATORVASTATIN CALCIUM 20 MG/1
20 TABLET, FILM COATED ORAL DAILY
Qty: 90 TABLET | Refills: 3 | Status: SHIPPED | OUTPATIENT
Start: 2022-12-13

## 2022-12-13 RX ORDER — HYDROXYCHLOROQUINE SULFATE 200 MG/1
TABLET, FILM COATED ORAL
Qty: 180 TABLET | Refills: 3 | Status: SHIPPED | OUTPATIENT
Start: 2022-12-13

## 2022-12-13 RX ORDER — CYCLOBENZAPRINE HCL 10 MG
TABLET ORAL
COMMUNITY
End: 2022-12-13 | Stop reason: ALTCHOICE

## 2022-12-13 RX ORDER — FOLIC ACID 1 MG/1
1 TABLET ORAL DAILY
Qty: 90 TABLET | Refills: 3 | Status: SHIPPED | OUTPATIENT
Start: 2022-12-13

## 2022-12-13 RX ORDER — HYDROCHLOROTHIAZIDE 12.5 MG/1
12.5 CAPSULE, GELATIN COATED ORAL DAILY
Qty: 90 CAPSULE | Refills: 3 | Status: SHIPPED | OUTPATIENT
Start: 2022-12-13

## 2022-12-13 RX ORDER — ERGOCALCIFEROL 1.25 MG/1
50000 CAPSULE ORAL WEEKLY
Qty: 12 CAPSULE | Refills: 3 | Status: SHIPPED | OUTPATIENT
Start: 2022-12-13

## 2022-12-13 RX ORDER — METFORMIN HYDROCHLORIDE 500 MG/1
500 TABLET, EXTENDED RELEASE ORAL 2 TIMES DAILY WITH MEALS
Qty: 180 TABLET | Refills: 3 | Status: SHIPPED | OUTPATIENT
Start: 2022-12-13

## 2022-12-13 RX ORDER — AMLODIPINE BESYLATE 5 MG/1
5 TABLET ORAL DAILY
COMMUNITY
End: 2022-12-13

## 2022-12-13 RX ORDER — METHOTREXATE 2.5 MG/1
20 TABLET ORAL WEEKLY
Qty: 96 TABLET | Refills: 3 | Status: SHIPPED | OUTPATIENT
Start: 2022-12-13

## 2023-03-14 ENCOUNTER — LAB ENCOUNTER (OUTPATIENT)
Dept: LAB | Age: 63
End: 2023-03-14
Attending: INTERNAL MEDICINE
Payer: COMMERCIAL

## 2023-03-14 ENCOUNTER — OFFICE VISIT (OUTPATIENT)
Dept: INTERNAL MEDICINE CLINIC | Facility: CLINIC | Age: 63
End: 2023-03-14

## 2023-03-14 VITALS
SYSTOLIC BLOOD PRESSURE: 130 MMHG | HEIGHT: 63 IN | HEART RATE: 72 BPM | TEMPERATURE: 98 F | DIASTOLIC BLOOD PRESSURE: 90 MMHG | BODY MASS INDEX: 32.78 KG/M2 | WEIGHT: 185 LBS

## 2023-03-14 DIAGNOSIS — Z12.31 VISIT FOR SCREENING MAMMOGRAM: ICD-10-CM

## 2023-03-14 DIAGNOSIS — I10 ESSENTIAL HYPERTENSION, BENIGN: Primary | ICD-10-CM

## 2023-03-14 DIAGNOSIS — B36.9 FUNGAL DERMATITIS: ICD-10-CM

## 2023-03-14 DIAGNOSIS — E11.9 DIABETES MELLITUS WITHOUT COMPLICATION (HCC): ICD-10-CM

## 2023-03-14 LAB
EST. AVERAGE GLUCOSE BLD GHB EST-MCNC: 117 MG/DL (ref 68–126)
HBA1C MFR BLD: 5.7 % (ref ?–5.7)

## 2023-03-14 PROCEDURE — 99214 OFFICE O/P EST MOD 30 MIN: CPT | Performed by: INTERNAL MEDICINE

## 2023-03-14 PROCEDURE — 3044F HG A1C LEVEL LT 7.0%: CPT | Performed by: INTERNAL MEDICINE

## 2023-03-14 PROCEDURE — 3008F BODY MASS INDEX DOCD: CPT | Performed by: INTERNAL MEDICINE

## 2023-03-14 PROCEDURE — 3075F SYST BP GE 130 - 139MM HG: CPT | Performed by: INTERNAL MEDICINE

## 2023-03-14 PROCEDURE — 90750 HZV VACC RECOMBINANT IM: CPT | Performed by: INTERNAL MEDICINE

## 2023-03-14 PROCEDURE — 36415 COLL VENOUS BLD VENIPUNCTURE: CPT | Performed by: INTERNAL MEDICINE

## 2023-03-14 PROCEDURE — 83036 HEMOGLOBIN GLYCOSYLATED A1C: CPT | Performed by: INTERNAL MEDICINE

## 2023-03-14 PROCEDURE — 3080F DIAST BP >= 90 MM HG: CPT | Performed by: INTERNAL MEDICINE

## 2023-03-14 PROCEDURE — 90471 IMMUNIZATION ADMIN: CPT | Performed by: INTERNAL MEDICINE

## 2023-03-23 RX ORDER — METFORMIN HYDROCHLORIDE 500 MG/1
500 TABLET, EXTENDED RELEASE ORAL 2 TIMES DAILY WITH MEALS
Qty: 180 TABLET | Refills: 3 | Status: SHIPPED | OUTPATIENT
Start: 2023-03-23

## 2023-03-23 NOTE — TELEPHONE ENCOUNTER
Refill passed per [place] [employer], [facility] protocol.   Requested Prescriptions   Pending Prescriptions Disp Refills    METFORMIN  MG Oral Tablet 24 Hr [Pharmacy Med Name: METFORMIN HCL  MG TABLET] 180 tablet 3     Sig: TAKE 1 TABLET BY MOUTH TWO TIMES A DAY WITH MEALS       Diabetes Medication Protocol Passed - 3/22/2023  8:21 PM        Passed - Last A1C < 7.5 and within past 6 months     Lab Results   Component Value Date    A1C 5.7 (H) 03/14/2023             Passed - In person appointment or virtual visit in the past 6 mos or appointment in next 3 mos     Recent Outpatient Visits              1 week ago [facility], [facility] [place]    Office Visit    3 months ago Essential hypertension, benign    [facility], [facility], [facility] [place]    Office Visit    7 months ago Annual physical exam    [address] [facility], [facility] Avinash Walton MD    Office Visit    8 months ago Nondisplaced fracture of fifth metatarsal bone, left foot, subsequent encounter for fracture with delayed healing    [facility], [address], [place], [place], [place]    Office Visit    10 months ago Nondisplaced fracture of fifth metatarsal bone, left foot, subsequent encounter for fracture with delayed healing    [address],[place] [address], [address], [place], [place], [place]    Office Visit          Future Appointments         Provider Department Appt Notes    In 2 months Avinash Walton MD [address], [address], [facility] 3 month f/u               Passed - EGFRCR or GFRAA > 50     GFR Evaluation  EGFRCR: 79 , resulted on 8/2/2022          Passed - GFR in the past 12 months           Recent Outpatient Visits              1 week ago Essential hypertension, benign    [address], Claudia Gonzalez MD    Office Visit    3 months ago Essential hypertension, benign    [facility], [address], [place] Claudia    Office Visit    7 months ago Annual physical exam    [address] Margarita Anand MD    Office Visit    8 months ago Nondisplaced fracture of fifth metatarsal bone, left foot, subsequent encounter for fracture with delayed healing    [facility], [address], [place], [place], [place]    Office Visit    10 months ago Nondisplaced fracture of fifth metatarsal bone, left foot, subsequent encounter for fracture with delayed healing    [address], [place], [place], [place]    Office Visit          Future Appointments         Provider Department Appt Notes    In 2 months Margarita Anand MD [address], [address], [place] 3 month f/u

## 2023-04-17 ENCOUNTER — TELEPHONE (OUTPATIENT)
Dept: FAMILY MEDICINE CLINIC | Facility: CLINIC | Age: 63
End: 2023-04-17

## 2023-04-17 ENCOUNTER — MED REC SCAN ONLY (OUTPATIENT)
Dept: INTERNAL MEDICINE CLINIC | Facility: CLINIC | Age: 63
End: 2023-04-17

## 2023-06-20 ENCOUNTER — OFFICE VISIT (OUTPATIENT)
Dept: INTERNAL MEDICINE CLINIC | Facility: CLINIC | Age: 63
End: 2023-06-20

## 2023-06-20 VITALS
DIASTOLIC BLOOD PRESSURE: 80 MMHG | TEMPERATURE: 98 F | HEIGHT: 63 IN | BODY MASS INDEX: 32.67 KG/M2 | HEART RATE: 64 BPM | WEIGHT: 184.38 LBS | SYSTOLIC BLOOD PRESSURE: 110 MMHG

## 2023-06-20 DIAGNOSIS — E78.00 PURE HYPERCHOLESTEROLEMIA: ICD-10-CM

## 2023-06-20 DIAGNOSIS — E11.9 DIABETES MELLITUS WITHOUT COMPLICATION (HCC): ICD-10-CM

## 2023-06-20 DIAGNOSIS — I10 ESSENTIAL HYPERTENSION, BENIGN: Primary | ICD-10-CM

## 2023-06-20 PROCEDURE — 90750 HZV VACC RECOMBINANT IM: CPT | Performed by: INTERNAL MEDICINE

## 2023-06-20 PROCEDURE — 90471 IMMUNIZATION ADMIN: CPT | Performed by: INTERNAL MEDICINE

## 2023-06-20 PROCEDURE — 3008F BODY MASS INDEX DOCD: CPT | Performed by: INTERNAL MEDICINE

## 2023-06-20 PROCEDURE — 99214 OFFICE O/P EST MOD 30 MIN: CPT | Performed by: INTERNAL MEDICINE

## 2023-06-20 PROCEDURE — 3074F SYST BP LT 130 MM HG: CPT | Performed by: INTERNAL MEDICINE

## 2023-06-20 PROCEDURE — 3079F DIAST BP 80-89 MM HG: CPT | Performed by: INTERNAL MEDICINE

## 2023-06-20 RX ORDER — HYDROMORPHONE HYDROCHLORIDE 10 MG/ML
INJECTION INTRAMUSCULAR; INTRAVENOUS; SUBCUTANEOUS
COMMUNITY
Start: 2023-03-15

## 2023-06-20 RX ORDER — CARISOPRODOL 350 MG/1
350 TABLET ORAL 2 TIMES DAILY PRN
Qty: 60 TABLET | Refills: 0 | Status: SHIPPED | OUTPATIENT
Start: 2023-06-20

## 2023-08-01 ENCOUNTER — MED REC SCAN ONLY (OUTPATIENT)
Dept: INTERNAL MEDICINE CLINIC | Facility: CLINIC | Age: 63
End: 2023-08-01

## 2023-09-14 RX ORDER — CARISOPRODOL 350 MG/1
350 TABLET ORAL 2 TIMES DAILY PRN
Qty: 60 TABLET | Refills: 3 | Status: SHIPPED | OUTPATIENT
Start: 2023-09-14

## 2023-09-26 ENCOUNTER — OFFICE VISIT (OUTPATIENT)
Dept: INTERNAL MEDICINE CLINIC | Facility: CLINIC | Age: 63
End: 2023-09-26

## 2023-09-26 ENCOUNTER — HOSPITAL ENCOUNTER (OUTPATIENT)
Dept: GENERAL RADIOLOGY | Age: 63
Discharge: HOME | End: 2023-09-26
Attending: INTERNAL MEDICINE
Payer: COMMERCIAL

## 2023-09-26 ENCOUNTER — TELEPHONE (OUTPATIENT)
Dept: INTERNAL MEDICINE CLINIC | Facility: CLINIC | Age: 63
End: 2023-09-26

## 2023-09-26 VITALS
TEMPERATURE: 98 F | HEIGHT: 63 IN | DIASTOLIC BLOOD PRESSURE: 70 MMHG | WEIGHT: 182 LBS | SYSTOLIC BLOOD PRESSURE: 120 MMHG | HEART RATE: 72 BPM | BODY MASS INDEX: 32.25 KG/M2

## 2023-09-26 DIAGNOSIS — I10 ESSENTIAL HYPERTENSION, BENIGN: Primary | ICD-10-CM

## 2023-09-26 DIAGNOSIS — E11.9 DIABETES MELLITUS WITHOUT COMPLICATION (HCC): ICD-10-CM

## 2023-09-26 DIAGNOSIS — M79.672 LEFT FOOT PAIN: ICD-10-CM

## 2023-09-26 DIAGNOSIS — E78.00 PURE HYPERCHOLESTEROLEMIA: ICD-10-CM

## 2023-09-26 DIAGNOSIS — M25.552 LEFT HIP PAIN: ICD-10-CM

## 2023-09-26 DIAGNOSIS — M25.552 LEFT HIP PAIN: Primary | ICD-10-CM

## 2023-09-26 PROCEDURE — 99214 OFFICE O/P EST MOD 30 MIN: CPT | Performed by: INTERNAL MEDICINE

## 2023-09-26 PROCEDURE — 3008F BODY MASS INDEX DOCD: CPT | Performed by: INTERNAL MEDICINE

## 2023-09-26 PROCEDURE — 3074F SYST BP LT 130 MM HG: CPT | Performed by: INTERNAL MEDICINE

## 2023-09-26 PROCEDURE — 3078F DIAST BP <80 MM HG: CPT | Performed by: INTERNAL MEDICINE

## 2023-09-26 PROCEDURE — 73630 X-RAY EXAM OF FOOT: CPT | Performed by: INTERNAL MEDICINE

## 2023-09-26 RX ORDER — GABAPENTIN 300 MG/1
300 CAPSULE ORAL 3 TIMES DAILY
COMMUNITY
Start: 2023-09-18

## 2023-09-26 NOTE — TELEPHONE ENCOUNTER
Pt states that she has an order from a different doctor to do a hip x-ray. Pt would like to know if Dr. Nolan Suggs can do an order for the hip-xray for her instead. Pt states that spoke with Dr. Nolan Suggs about this in the office visit today. . Pt does not have the order from the other doctor with her. Per the patient she would like to know if this can be done today. Please, fax the order to [phone].

## 2023-09-28 ENCOUNTER — HOSPITAL ENCOUNTER (OUTPATIENT)
Dept: GENERAL RADIOLOGY | Age: 63
Discharge: HOME | End: 2023-09-28
Attending: INTERNAL MEDICINE
Payer: COMMERCIAL

## 2023-09-28 DIAGNOSIS — M25.552 LEFT HIP PAIN: ICD-10-CM

## 2023-09-28 PROCEDURE — 73503 X-RAY EXAM HIP UNI 4/> VIEWS: CPT | Performed by: INTERNAL MEDICINE

## 2023-09-29 ENCOUNTER — TELEPHONE (OUTPATIENT)
Dept: INTERNAL MEDICINE CLINIC | Facility: CLINIC | Age: 63
End: 2023-09-29

## 2023-09-29 DIAGNOSIS — N60.12 FIBROCYSTIC DISEASE OF BOTH BREASTS: Primary | ICD-10-CM

## 2023-09-29 DIAGNOSIS — N60.11 FIBROCYSTIC DISEASE OF BOTH BREASTS: Primary | ICD-10-CM

## 2023-09-29 NOTE — TELEPHONE ENCOUNTER
Patient needs a new order for a mammogram that includes an ultrasound. She went to have it done the other day and they would not do it. Stated she needed this new order.

## 2023-09-29 NOTE — TELEPHONE ENCOUNTER
I called the Anival Trujillo with [facility] and left a message for them to call back to give what details are needed for the mammogram order.

## 2023-10-03 NOTE — TELEPHONE ENCOUNTER
Message left for patient to call her breast center to fax us the information that is being requested from Dr Nolan Suggs.

## 2023-10-04 NOTE — TELEPHONE ENCOUNTER
Per Claudette with [facility], patient needs to know if she needs a diagnostic or screening mammogram with ultrasound. If a diagnostic mammogram is needed patient will need an order. If patient needs a screening mammogram no order is needed. Please call patient and provide update.

## 2023-10-05 ENCOUNTER — MED REC SCAN ONLY (OUTPATIENT)
Dept: INTERNAL MEDICINE CLINIC | Facility: CLINIC | Age: 63
End: 2023-10-05

## 2023-10-05 PROBLEM — N60.11 FIBROCYSTIC DISEASE OF BOTH BREASTS: Status: ACTIVE | Noted: 2023-10-05

## 2023-10-05 PROBLEM — N60.12 FIBROCYSTIC DISEASE OF BOTH BREASTS: Status: ACTIVE | Noted: 2023-10-05

## 2023-10-05 NOTE — TELEPHONE ENCOUNTER
I spoke with the patient and it has been determined that she needs an order for a bilateral diagnostic mammogram with ultrasound.   Order should be mailed to the patient per her request.

## 2023-10-16 NOTE — TELEPHONE ENCOUNTER
Xray order placed for pt per Dr. Meraz Ivet Humira Counseling:  I discussed with the patient the risks of adalimumab including but not limited to myelosuppression, immunosuppression, autoimmune hepatitis, demyelinating diseases, lymphoma, and serious infections.  The patient understands that monitoring is required including a PPD at baseline and must alert us or the primary physician if symptoms of infection or other concerning signs are noted.

## 2023-11-10 ENCOUNTER — TELEPHONE (OUTPATIENT)
Dept: INTERNAL MEDICINE CLINIC | Facility: CLINIC | Age: 63
End: 2023-11-10

## 2023-11-10 NOTE — TELEPHONE ENCOUNTER
Per patient she would like a copy of her result  of her left hip Xray send to Dr Allan Munguia Fax# [phone] patient appointment is on Monday 11/13/2023.

## 2023-12-11 ENCOUNTER — TELEPHONE (OUTPATIENT)
Dept: INTERNAL MEDICINE CLINIC | Facility: CLINIC | Age: 63
End: 2023-12-11

## 2023-12-11 NOTE — TELEPHONE ENCOUNTER
Pt requesting XR of left hip taken on 9/28/23 be faxed to # [phone] which is [facility]. Pt is at office now.  Please advise

## 2023-12-19 ENCOUNTER — LAB ENCOUNTER (OUTPATIENT)
Dept: LAB | Age: 63
End: 2023-12-19
Attending: INTERNAL MEDICINE
Payer: COMMERCIAL

## 2023-12-19 ENCOUNTER — OFFICE VISIT (OUTPATIENT)
Dept: INTERNAL MEDICINE CLINIC | Facility: CLINIC | Age: 63
End: 2023-12-19

## 2023-12-19 VITALS
SYSTOLIC BLOOD PRESSURE: 120 MMHG | WEIGHT: 184 LBS | HEART RATE: 76 BPM | HEIGHT: 63 IN | DIASTOLIC BLOOD PRESSURE: 80 MMHG | TEMPERATURE: 98 F | BODY MASS INDEX: 32.6 KG/M2

## 2023-12-19 DIAGNOSIS — E78.00 PURE HYPERCHOLESTEROLEMIA: ICD-10-CM

## 2023-12-19 DIAGNOSIS — E11.9 DIABETES MELLITUS WITHOUT COMPLICATION (HCC): ICD-10-CM

## 2023-12-19 DIAGNOSIS — I10 ESSENTIAL HYPERTENSION, BENIGN: Primary | ICD-10-CM

## 2023-12-19 LAB
ALBUMIN SERPL-MCNC: 4.4 G/DL (ref 3.2–4.8)
ALBUMIN/GLOB SERPL: 1.8 {RATIO} (ref 1–2)
ALP LIVER SERPL-CCNC: 58 U/L
ALT SERPL-CCNC: 21 U/L
ANION GAP SERPL CALC-SCNC: 6 MMOL/L (ref 0–18)
AST SERPL-CCNC: 25 U/L (ref ?–34)
BILIRUB SERPL-MCNC: 0.7 MG/DL (ref 0.2–1.1)
BUN BLD-MCNC: 12 MG/DL (ref 9–23)
BUN/CREAT SERPL: 15.4 (ref 10–20)
CALCIUM BLD-MCNC: 9.3 MG/DL (ref 8.7–10.4)
CHLORIDE SERPL-SCNC: 104 MMOL/L (ref 98–112)
CO2 SERPL-SCNC: 31 MMOL/L (ref 21–32)
CREAT BLD-MCNC: 0.78 MG/DL
CREAT UR-SCNC: 102 MG/DL
EGFRCR SERPLBLD CKD-EPI 2021: 86 ML/MIN/1.73M2 (ref 60–?)
EST. AVERAGE GLUCOSE BLD GHB EST-MCNC: 108 MG/DL (ref 68–126)
FASTING STATUS PATIENT QL REPORTED: NO
GLOBULIN PLAS-MCNC: 2.4 G/DL (ref 2.8–4.4)
GLUCOSE BLD-MCNC: 91 MG/DL (ref 70–99)
HBA1C MFR BLD: 5.4 % (ref ?–5.7)
MICROALBUMIN UR-MCNC: 2.3 MG/DL
MICROALBUMIN/CREAT 24H UR-RTO: 22.5 UG/MG (ref ?–30)
OSMOLALITY SERPL CALC.SUM OF ELEC: 291 MOSM/KG (ref 275–295)
POTASSIUM SERPL-SCNC: 3.9 MMOL/L (ref 3.5–5.1)
PROT SERPL-MCNC: 6.8 G/DL (ref 5.7–8.2)
SODIUM SERPL-SCNC: 141 MMOL/L (ref 136–145)

## 2023-12-19 PROCEDURE — 80053 COMPREHEN METABOLIC PANEL: CPT

## 2023-12-19 PROCEDURE — 3079F DIAST BP 80-89 MM HG: CPT | Performed by: INTERNAL MEDICINE

## 2023-12-19 PROCEDURE — 82570 ASSAY OF URINE CREATININE: CPT

## 2023-12-19 PROCEDURE — 99214 OFFICE O/P EST MOD 30 MIN: CPT | Performed by: INTERNAL MEDICINE

## 2023-12-19 PROCEDURE — 83036 HEMOGLOBIN GLYCOSYLATED A1C: CPT | Performed by: INTERNAL MEDICINE

## 2023-12-19 PROCEDURE — 82043 UR ALBUMIN QUANTITATIVE: CPT

## 2023-12-19 PROCEDURE — 36415 COLL VENOUS BLD VENIPUNCTURE: CPT | Performed by: INTERNAL MEDICINE

## 2023-12-19 PROCEDURE — 3008F BODY MASS INDEX DOCD: CPT | Performed by: INTERNAL MEDICINE

## 2023-12-19 PROCEDURE — 3074F SYST BP LT 130 MM HG: CPT | Performed by: INTERNAL MEDICINE

## 2023-12-19 RX ORDER — PREDNISONE 5 MG/1
TABLET ORAL
COMMUNITY
Start: 2023-12-11

## 2023-12-19 RX ORDER — DUPILUMAB 300 MG/2ML
300 INJECTION, SOLUTION SUBCUTANEOUS
COMMUNITY
Start: 2023-10-11

## 2023-12-30 RX ORDER — AMLODIPINE BESYLATE 5 MG/1
5 TABLET ORAL DAILY
Qty: 90 TABLET | Refills: 3 | Status: SHIPPED | OUTPATIENT
Start: 2023-12-30

## 2023-12-30 NOTE — TELEPHONE ENCOUNTER
Refill passed per [facility] protocol.     Requested Prescriptions   Pending Prescriptions Disp Refills    AMLODIPINE 5 MG Oral Tab [Pharmacy Med Name: AMLODIPINE BESYLATE 5 MG TAB] 90 tablet 3     Sig: TAKE 1 TABLET BY MOUTH DAILY       Hypertensive Medications Protocol Passed - 12/29/2023  3:03 PM        Passed - In person appointment in the past 12 or next 3 months     Recent Outpatient Visits              1 week ago Essential hypertension, [facility] Patrick Anderson MD    Office Visit    3 months ago Essential hypertension, [facility] Patrick Anderson MD    Office Visit    6 months ago Essential hypertension, [facility] Patrick Anderson MD    Office Visit    9 months ago Essential hypertension, [facility] Patrick Anderson MD    Office Visit    1 year ago Essential hypertension, [facility] Patrick Anderson MD    Office Visit          Future Appointments         Provider Department Appt Notes    In 2 months Patrick Anderson MD [facility] 3month follow up               Passed - Last BP reading less than 140/90     BP Readings from Last 1 Encounters:   12/19/23 120/80               Passed - CMP or BMP in past 6 months     Recent Results (from the past 4392 hour(s))   Comp Metabolic Panel (14)    Collection Time: 12/19/23  9:49 AM   Result Value Ref Range    Glucose 91 70 - 99 mg/dL    Sodium 141 136 - 145 mmol/L    Potassium 3.9 3.5 - 5.1 mmol/L    Chloride 104 98 - 112 mmol/L    CO2 31.0 21.0 - 32.0 mmol/L    Anion Gap 6 0 - 18 mmol/L    BUN 12 9 - 23 mg/dL    Creatinine 0.78 0.55 - 1.02 mg/dL    BUN/CREA Ratio 15.4 10.0 - 20.0    Calcium, Total 9.3 8.7 - 10.4 mg/dL    Calculated Osmolality 291 275 - 295 mOsm/kg    eGFR-Cr  86 >=60 mL/min/1.73m2    ALT 21 10 - 49 U/L    AST 25 <=34 U/L    Alkaline Phosphatase 58 50 - 130 U/L    Bilirubin, Total 0.7 0.2 - 1.1 mg/dL    Total Protein 6.8 5.7 - 8.2 g/dL    Albumin 4.4 3.2 - 4.8 g/dL    Globulin  2.4 (L) 2.8 - 4.4 g/dL    A/G Ratio 1.8 1.0 - 2.0    Patient Fasting for CMP? No      *Note: Due to a large number of results and/or encounters for the requested time period, some results have not been displayed. A complete set of results can be found in Results Review.               Passed - In person appointment or virtual visit in the past 6 months     Recent Outpatient Visits              1 week [facility], [facility] Patrick Anderson MD    Office Visit    3 months ago [facility]    Office Visit    6 months ago [facility]ential hypertension, [facility]    Office Visit    9 months ago Essential hypertension, [facility]    Office Visit    1 year ago Essential hypertension, [facility] Patrick Anderson MD    Office Visit          Future Appointments         Provider Department Appt Notes    In 2 months Patrick Anderson MD [facility] 3month follow up               Passed - EGFRCR or GFRAA > 50     GFR Evaluation  EGFRCR: 86 , resulted on 12/19/2023               [unfilled]      [unfilled]   External [place]/External [facility]

## 2024-01-02 RX ORDER — HYDROXYCHLOROQUINE SULFATE 200 MG/1
TABLET, FILM COATED ORAL
Qty: 180 TABLET | Refills: 3 | Status: SHIPPED | OUTPATIENT
Start: 2024-01-02

## 2024-01-02 RX ORDER — METHOTREXATE 2.5 MG/1
20 TABLET ORAL WEEKLY
Qty: 96 TABLET | Refills: 3 | Status: SHIPPED | OUTPATIENT
Start: 2024-01-02

## 2024-01-02 RX ORDER — ATORVASTATIN CALCIUM 20 MG/1
20 TABLET, FILM COATED ORAL DAILY
Qty: 90 TABLET | Refills: 3 | Status: SHIPPED | OUTPATIENT
Start: 2024-01-02

## 2024-01-02 RX ORDER — HYDROCHLOROTHIAZIDE 12.5 MG/1
12.5 CAPSULE, GELATIN COATED ORAL DAILY
Qty: 90 CAPSULE | Refills: 3 | Status: SHIPPED | OUTPATIENT
Start: 2024-01-02

## 2024-01-02 RX ORDER — ERGOCALCIFEROL 1.25 MG/1
50000 CAPSULE ORAL WEEKLY
Qty: 12 CAPSULE | Refills: 3 | Status: SHIPPED | OUTPATIENT
Start: 2024-01-02

## 2024-01-02 NOTE — TELEPHONE ENCOUNTER
Refill passed per [facility] protocol.  Requested Prescriptions   Pending Prescriptions Disp Refills    ERGOCALCIFEROL 1.25 MG (36303 UT) Oral Cap [Pharmacy Med Name: VITAMIN D2 1.25MG(50,000 UNIT)] 12 capsule 3     Sig: TAKE 1 CAPSULE BY MOUTH ONCE A WEEK       There is no refill protocol information for this order       HYDROXYCHLOROQUINE 200 MG Oral Tab [Pharmacy Med Name: HYDROXYCHLOROQUINE 200 MG TAB] 180 tablet 3     Sig: TAKE 1 TABLET BY MOUTH TWICE DAILY       There is no refill protocol information for this order       ATORVASTATIN 20 MG Oral Tab [Pharmacy Med Name: ATORVASTATIN 20 MG TABLET] 90 tablet 3     Sig: TAKE 1 TABLET BY MOUTH DAILY       Cholesterol Medication Protocol Failed - 1/1/2024  5:11 PM        Failed - LDL in past 12 months        Failed - Last LDL < 130     Lab Results   Component Value Date    LDL 53 08/02/2022             Passed - ALT in past 12 months        Passed - Last ALT < 80     Lab Results   Component Value Date    ALT 21 12/19/2023             Passed - In person appointment or virtual visit in the past 12 mos or appointment in next 3 mos     Recent Outpatient Visits              2 weeks ago Essential hypertension, benign    [facility], [facility] Patrick Anderson MD    Office Visit    3 months ago Essential hypertension, [facility], [facility] Patrick Anderson MD    Office Visit    6 months ago Essential hypertension, [facility], [facility] Patrick Anderson MD    Office Visit    9 months ago Essential hypertension, benign    [facility], [facility] Patrick Anderson MD    Office Visit    1 year ago Essential hypertension, [facility], [facility] Patrick Anderson MD    Office Visit          Future Appointments         Provider Department Appt Notes    In 2 months Patrick Anderson MD [facility]  [facility] 3month follow up                 HYDROCHLOROTHIAZIDE 12.5 MG Oral Cap [Pharmacy Med Name: HYDROCHLOROTHIAZIDE 12.5 MG CP] 90 capsule 3     Sig: TAKE 1 TABLET BY MOUTH DAILY       Hypertensive Medications Protocol Passed - 1/1/2024  5:11 PM        Passed - In person appointment in the past 12 or next 3 months     Recent Outpatient Visits              2 weeks ago Essential hypertension, [facility] Patrick Anderson MD    Office Visit    3 months ago Essential hypertension, benign    [facility] Patrick Anderson MD    Office Visit    6 months ago Essential hypertension, benign    [facility] Patrick Anderson MD    Office Visit    9 months ago Essential hypertension, [facility] Patrick Anderson MD    Office Visit    1 year ago Essential hypertension, [facility], [facility] Patrick Anderson MD    Office Visit          Future Appointments         Provider Department Appt Notes    In 2 months Patrick Anderson MD [facility] 3month follow up               Passed - Last BP reading less than 140/90     BP Readings from Last 1 Encounters:   12/19/23 120/80               Passed - CMP or BMP in past 6 months     Recent Results (from the past 4392 hour(s))   Comp Metabolic Panel (14)    Collection Time: 12/19/23  9:49 AM   Result Value Ref Range    Glucose 91 70 - 99 mg/dL    Sodium 141 136 - 145 mmol/L    Potassium 3.9 3.5 - 5.1 mmol/L    Chloride 104 98 - 112 mmol/L    CO2 31.0 21.0 - 32.0 mmol/L    Anion Gap 6 0 - 18 mmol/L    BUN 12 9 - 23 mg/dL    Creatinine 0.78 0.55 - 1.02 mg/dL    BUN/CREA Ratio 15.4 10.0 - 20.0    Calcium, Total 9.3 8.7 - 10.4 mg/dL    Calculated Osmolality 291 275 - 295 mOsm/kg    eGFR-Cr 86 >=60 mL/min/1.73m2    ALT 21 10 -  49 U/L    AST 25 <=34 U/L    Alkaline Phosphatase 58 50 - 130 U/L    Bilirubin, Total 0.7 0.2 - 1.1 mg/dL    Total Protein 6.8 5.7 - 8.2 g/dL    Albumin 4.4 3.2 - 4.8 g/dL    Globulin  2.4 (L) 2.8 - 4.4 g/dL    A/G Ratio 1.8 1.0 - 2.0    Patient Fasting for CMP? No      *Note: Due to a large number of results and/or encounters for the requested time period, some results have not been displayed. A complete set of results can be found in Results Review.               Passed - In person appointment or virtual visit in the past 6 months     Recent Outpatient Visits              2 weeks ago Essential hypertension, [facility] Patrick Anderson MD    Office Visit    3 months ago Essential hypertension, [facility] Patrick Anderson MD    Office Visit    6 months ago Essential hypertension, [facility] Patrick Anderson MD    Office Visit    9 months ago Essential hypertension, [facility] Patrick Anderson MD    Office Visit    1 year ago Essential hypertension, [facility] Patrick Anderson MD    Office Visit          Future Appointments         Provider Department Appt Notes    In 2 months Patrick Anderson MD [facility] 3month follow up               Passed - EGFRCR or GFRAA > 50     GFR Evaluation  EGFRCR: 86 , resulted on 12/19/2023            METHOTREXATE 2.5 MG Oral Tab [Pharmacy Med Name: METHOTREXATE 2.5 MG TABLET] 96 tablet 3     Sig: TAKE 8 TABLETS BY MOUTH ONCE A WEEK       There is no refill protocol information for this order        Recent Outpatient Visits              2 weeks ago Essential hypertension, [facility] Patrick Willingham MD    Office Visit    3 months ago Essential  hypertension, benign    [facility], [facility] Patrick Anderson MD    Office Visit    6 months ago Essential hypertension, benign    [facility] Patrick Anderson MD    Office Visit    9 months ago Essential hypertension, benign    [facility] Patrick Anderson MD    Office Visit    1 year ago Essential hypertension, benign    [facility] Patrick Anderson MD    Office Visit          Future Appointments         Provider Department Appt Notes    In 2 months Patrick Anderson MD [facility], [facility] 3month follow up

## 2024-01-02 NOTE — TELEPHONE ENCOUNTER
Please review; protocol failed/ has no protocol    Requested Prescriptions   Pending Prescriptions Disp Refills    ergocalciferol 1.25 MG (50298 UT) Oral Cap [Pharmacy Med Name: VITAMIN D2 1.25MG(50,000 UNIT)] 12 capsule 3     Sig: Take 1 capsule (50,000 Units total) by mouth once a week.       There is no refill protocol information for this order       hydroxychloroquine 200 MG Oral Tab [Pharmacy Med Name: HYDROXYCHLOROQUINE 200 MG TAB] 180 tablet 3     Sig: TAKE 1 TABLET BY MOUTH TWICE DAILY       There is no refill protocol information for this order       atorvastatin 20 MG Oral Tab [Pharmacy Med Name: ATORVASTATIN 20 MG TABLET] 90 tablet 3     Sig: Take 1 tablet (20 mg total) by mouth daily.       Cholesterol Medication Protocol Failed - 1/1/2024  5:11 PM        Failed - LDL in past 12 months        Failed - Last LDL < 130     Lab Results   Component Value Date    LDL 53 08/02/2022             Passed - ALT in past 12 months        Passed - Last ALT < 80     Lab Results   Component Value Date    ALT 21 12/19/2023             Passed - In person appointment or virtual visit in the past 12 mos or appointment in next 3 mos     Recent Outpatient Visits              2 weeks ago Essential hypertension, benign    [facility], [facility] Patrick Willingham MD    Office Visit    3 months ago Essential hypertension, benign    [facility], [facility] Patrick Robert MD    Office Visit    6 months ago Essential hypertension, benign    [facility]    [facility] Visit    9 months ago Essential hypertension, benign    [facility], [facility] Patrick Robert MD    Office Visit    1 year ago Essential hypertension, benign    [facility]    [facility] Visit          Future Appointments         Provider Department Appt Notes    In 2 months  Patrick Anderson MD [facility] 3month follow up                 methotrexate 2.5 MG Oral Tab [Pharmacy Med Name: METHOTREXATE 2.5 MG TABLET] 96 tablet 3     Sig: Take 8 tablets (20 mg total) by mouth once a week.       There is no refill protocol information for this order      Signed Prescriptions Disp Refills    hydroCHLOROthiazide 12.5 MG Oral Cap 90 capsule 3     Sig: Take 1 capsule (12.5 mg total) by mouth daily.       Hypertensive Medications Protocol Passed - 1/1/2024  5:11 PM        Passed - In person appointment in the past 12 or next 3 months     Recent Outpatient Visits              2 weeks ago Essential hypertension, [facility] Patrick Anderson MD    Office Visit    3 months ago Essential hypertension, [facility] Patrick Anderson MD    Office Visit    6 months ago Essential hypertension, [facility] Patrick Anderson MD    Office Visit    9 months ago Essential hypertension, [facility] Patrick Anderson MD    Office Visit    1 year ago Essential hypertension, [facility], [facility] Patrick Anderson MD    Office Visit          Future Appointments         Provider Department Appt Notes    In 2 months Patrick Anderson MD [facility] 3month follow up               Passed - Last BP reading less than 140/90     BP Readings from Last 1 Encounters:   12/19/23 120/80               Passed - CMP or BMP in past 6 months     Recent Results (from the past 4392 hour(s))   Comp Metabolic Panel (14)    Collection Time: 12/19/23  9:49 AM   Result Value Ref Range    Glucose 91 70 - 99 mg/dL    Sodium 141 136 - 145 mmol/L    Potassium 3.9 3.5 - 5.1 mmol/L    Chloride 104 98 - 112 mmol/L    CO2 31.0 21.0 - 32.0  mmol/L    Anion Gap 6 0 - 18 mmol/L    BUN 12 9 - 23 mg/dL    Creatinine 0.78 0.55 - 1.02 mg/dL    BUN/CREA Ratio 15.4 10.0 - 20.0    Calcium, Total 9.3 8.7 - 10.4 mg/dL    Calculated Osmolality 291 275 - 295 mOsm/kg    eGFR-Cr 86 >=60 mL/min/1.73m2    ALT 21 10 - 49 U/L    AST 25 <=34 U/L    Alkaline Phosphatase 58 50 - 130 U/L    Bilirubin, Total 0.7 0.2 - 1.1 mg/dL    Total Protein 6.8 5.7 - 8.2 g/dL    Albumin 4.4 3.2 - 4.8 g/dL    Globulin  2.4 (L) 2.8 - 4.4 g/dL    A/G Ratio 1.8 1.0 - 2.0    Patient Fasting for CMP? No      *Note: Due to a large number of results and/or encounters for the requested time period, some results have not been displayed. A complete set of results can be found in Results Review.               Passed - In person appointment or virtual visit in the past 6 months     Recent Outpatient Visits              2 weeks ago Essential hypertension, [facility] Patrick Anderson MD    Office Visit    3 months ago Essential hypertension, [facility] Patrick Anderson MD    Office Visit    6 months ago Essential hypertension, [facility] Patrick Anderson MD    Office Visit    9 months ago Essential hypertension, [facility] Patrick Anderson MD    Office Visit    1 year ago Essential hypertension, [facility] Patrick Anderson MD    Office Visit          Future Appointments         Provider Department Appt Notes    In 2 months Patrick Anderson MD [facility] 3month follow up               Passed - EGFRCR or GFRAA > 50     GFR Evaluation  EGFRCR: 86 , resulted on 12/19/2023             Recent Outpatient Visits              2 weeks ago Essential hypertension, [facility], [facility]  Patrick Anderson MD    Office Visit    3 months ago Essential hypertension, benign    [facility] [place] Patrick Anderson MD    Office Visit    6 months ago Essential hypertension, benign    [facility] [place] Patrick Anderson MD    Office Visit    9 months ago Essential hypertension, benign    [facility] [place] Patrick Anderson MD    Office Visit    1 year ago Essential hypertension, [facility], [facility] Patrick Anderson MD    Office Visit          Future Appointments         Provider Department Appt Notes    In 2 months Patrick Anderson MD [facility] [place] 3month follow up

## 2024-01-07 RX ORDER — LISINOPRIL 20 MG/1
20 TABLET ORAL DAILY
Qty: 90 TABLET | Refills: 3 | Status: SHIPPED | OUTPATIENT
Start: 2024-01-07

## 2024-01-07 NOTE — TELEPHONE ENCOUNTER
Refill Passed per Protocol.     Requested Prescriptions   Pending Prescriptions Disp Refills    FOLIC ACID 1 MG Oral Tab [Pharmacy Med Name: FOLIC ACID 1 MG TABLET] 90 tablet 3     Sig: TAKE 1 TABLET BY MOUTH DAILY       There is no refill protocol information for this order       LISINOPRIL 20 MG Oral Tab [Pharmacy Med Name: LISINOPRIL 20 MG TABLET] 90 tablet 3     Sig: TAKE 1 TABLET BY MOUTH DAILY       Hypertensive Medications Protocol Passed - 1/5/2024  5:18 PM        Passed - In person appointment in the past 12 or next 3 months     Recent Outpatient Visits              2 weeks ago Essential hypertension, [facility], [facility] Patrick Anderson MD    Office Visit    3 months ago Essential hypertension, [facility] Patrick Anderson MD    Office Visit    6 months ago Essential hypertension, [facility] Patrick Anderson MD    Office Visit    9 months ago Essential hypertension, [facility] Patrick Anderson MD    Office Visit    1 year ago Essential hypertension, [facility] Patrick Anderson MD    Office Visit          Future Appointments         Provider Department Appt Notes    In 2 months Patrick Anderson MD [facility], [facility] 3month follow up               Passed - Last BP reading less than 140/90     BP Readings from Last 1 Encounters:   12/19/23 120/80               Passed - CMP or BMP in past 6 months     Recent Results (from the past 4392 hour(s))   Comp Metabolic Panel (14)    Collection Time: 12/19/23  9:49 AM   Result Value Ref Range    Glucose 91 70 - 99 mg/dL    Sodium 141 136 - 145 mmol/L    Potassium 3.9 3.5 - 5.1 mmol/L    Chloride 104 98 - 112 mmol/L    CO2 31.0 21.0 - 32.0 mmol/L    Anion Gap 6 0 - 18 mmol/L    BUN 12 9 - 23 mg/dL     Creatinine 0.78 0.55 - 1.02 mg/dL    BUN/CREA Ratio 15.4 10.0 - 20.0    Calcium, Total 9.3 8.7 - 10.4 mg/dL    Calculated Osmolality 291 275 - 295 mOsm/kg    eGFR-Cr 86 >=60 mL/min/1.73m2    ALT 21 10 - 49 U/L    AST 25 <=34 U/L    Alkaline Phosphatase 58 50 - 130 U/L    Bilirubin, Total 0.7 0.2 - 1.1 mg/dL    Total Protein 6.8 5.7 - 8.2 g/dL    Albumin 4.4 3.2 - 4.8 g/dL    Globulin  2.4 (L) 2.8 - 4.4 g/dL    A/G Ratio 1.8 1.0 - 2.0    Patient Fasting for CMP? No      *Note: Due to a large number of results and/or encounters for the requested time period, some results have not been displayed. A complete set of results can be found in Results Review.               Passed - In person appointment or virtual visit in the past 6 months     Recent Outpatient Visits              2 weeks ago Essential hypertension, benign    [facility], [facility] Patrick Anderson MD    Office Visit    3 months ago Essential hypertension, [facility] Patrick Anderson MD    Office Visit    6 months ago Essential hypertension, [facility] Patrick Anderson MD    Office Visit    9 months ago Essential hypertension, [facility] Patrick Anderson MD    Office Visit    1 year ago Essential hypertension, [facility] Patrick Anderson MD    Office Visit          Future Appointments         Provider Department Appt Notes    In 2 months Patrick Anderson MD [facility] 3month follow up               Passed - EGFRCR or GFRAA > 50     GFR Evaluation  EGFRCR: 86 , resulted on 12/19/2023                Future Appointments         Provider Department Appt Notes    In 2 months Patrick Anderson MD [facility] 3month follow up          Recent Outpatient Visits               2 weeks ago Essential hypertension, benign    [facility], [facility] Patrick Willnigham MD    Office Visit    3 months ago Essential hypertension, benign    [facility], [facility] Patrick Robert MD    Office Visit    6 months ago Essential hypertension, benign    [facility]    [facility] Visit    9 months ago Essential hypertension, benign    [facility], [facility] Patrick Robert MD    Office Visit    1 year ago Essential hypertension, benign    [facility]    [facility] Visit

## 2024-01-07 NOTE — TELEPHONE ENCOUNTER
Failed Protocol/No Protocol.    Requested Prescriptions   Pending Prescriptions Disp Refills    folic acid 1 MG Oral Tab [Pharmacy Med Name: FOLIC ACID 1 MG TABLET] 90 tablet 3     Sig: Take 1 tablet (1 mg total) by mouth daily.       There is no refill protocol information for this order      Signed Prescriptions Disp Refills    lisinopril 20 MG Oral Tab 90 tablet 3     Sig: Take 1 tablet (20 mg total) by mouth daily.       Hypertensive Medications Protocol Passed - 1/5/2024  5:18 PM        Passed - In person appointment in the past 12 or next 3 months     Recent Outpatient Visits              2 weeks ago Essential hypertension, [facility], [facility] Patrick Anderson MD    Office Visit    3 months ago Essential hypertension, [facility], [facility] Patrick Anderson MD    Office Visit    6 months ago Essential hypertension, [facility], [facility] Patrick Anderson MD    Office Visit    9 months ago Essential hypertension, [facility], [facility] Patrick Anderson MD    Office Visit    1 year ago Essential hypertension, [facility], [facility] Patrick Anderson MD    Office Visit          Future Appointments         Provider Department Appt Notes    In 2 months Patrick Anderson MD [facility], [facility] 3month follow up               Passed - Last BP reading less than 140/90     BP Readings from Last 1 Encounters:   12/19/23 120/80               Passed - CMP or BMP in past 6 months     Recent Results (from the past 4392 hour(s))   Comp Metabolic Panel (14)    Collection Time: 12/19/23  9:49 AM   Result Value Ref Range    Glucose 91 70 - 99 mg/dL    Sodium 141 136 - 145 mmol/L    Potassium 3.9 3.5 - 5.1 mmol/L    Chloride 104 98 - 112 mmol/L    CO2 31.0 21.0 - 32.0 mmol/L    Anion Gap 6 0 - 18 mmol/L    BUN  12 9 - 23 mg/dL    Creatinine 0.78 0.55 - 1.02 mg/dL    BUN/CREA Ratio 15.4 10.0 - 20.0    Calcium, Total 9.3 8.7 - 10.4 mg/dL    Calculated Osmolality 291 275 - 295 mOsm/kg    eGFR-Cr 86 >=60 mL/min/1.73m2    ALT 21 10 - 49 U/L    AST 25 <=34 U/L    Alkaline Phosphatase 58 50 - 130 U/L    Bilirubin, Total 0.7 0.2 - 1.1 mg/dL    Total Protein 6.8 5.7 - 8.2 g/dL    Albumin 4.4 3.2 - 4.8 g/dL    Globulin  2.4 (L) 2.8 - 4.4 g/dL    A/G Ratio 1.8 1.0 - 2.0    Patient Fasting for CMP? No      *Note: Due to a large number of results and/or encounters for the requested time period, some results have not been displayed. A complete set of results can be found in Results Review.               Passed - In person appointment or virtual visit in the past 6 months     Recent Outpatient Visits              2 weeks ago Essential hypertension, [facility], [facility] Patrick Anderson MD    Office Visit    3 months ago Essential hypertension, [facility] [place] [facility] Patrick Anderson MD    Office Visit    6 months ago Essential hypertension, [facility] [place] [facility] Patrick Anderson MD    Office Visit    9 months ago Essential hypertension, [facility] [place] [facility] Patrick Anderson MD    Office Visit    1 year ago Essential hypertension, [facility] [place] [facility] Patrick Anderson MD    Office Visit          Future Appointments         Provider Department Appt Notes    In 2 months Patrick Anderson MD [facility] 3month follow up               Passed - EGFRCR or GFRAA > 50     GFR Evaluation  EGFRCR: 86 , resulted on 12/19/2023               Future Appointments         Provider Department Appt Notes    In 2 months Patrick Anderson MD [facility] 3month follow up          Recent  Outpatient Visits              2 weeks ago Essential hypertension, benign    [facility], [facility] Patrick Willingham MD    Office Visit    3 months ago Essential hypertension, benign    [facility]    [facility]    6 months ago Essential hypertension, benign    [facility]    Office Visit    9 months ago Essential hypertension, benign    [facility], [facility] Patrick Willingham MD    Office Visit    1 year ago Essential hypertension, benign    [facility], [facility] Patrick Robert MD    Office Visit

## 2024-01-08 RX ORDER — FOLIC ACID 1 MG/1
1 TABLET ORAL DAILY
Qty: 90 TABLET | Refills: 3 | Status: SHIPPED | OUTPATIENT
Start: 2024-01-08

## 2024-01-17 ENCOUNTER — APPOINTMENT (OUTPATIENT)
Dept: ULTRASOUND IMAGING | Age: 64
End: 2024-01-17
Attending: PHYSICIAN ASSISTANT
Payer: COMMERCIAL

## 2024-01-17 ENCOUNTER — APPOINTMENT (OUTPATIENT)
Dept: GENERAL RADIOLOGY | Age: 64
End: 2024-01-17
Attending: PHYSICIAN ASSISTANT
Payer: COMMERCIAL

## 2024-01-17 ENCOUNTER — HOSPITAL ENCOUNTER (OUTPATIENT)
Age: 64
Discharge: HOME | End: 2024-01-17
Payer: COMMERCIAL

## 2024-01-17 VITALS
SYSTOLIC BLOOD PRESSURE: 136 MMHG | OXYGEN SATURATION: 99 % | TEMPERATURE: 100 F | DIASTOLIC BLOOD PRESSURE: 80 MMHG | HEART RATE: 89 BPM | RESPIRATION RATE: 20 BRPM

## 2024-01-17 DIAGNOSIS — Z87.39 HISTORY OF RHEUMATOID ARTHRITIS: ICD-10-CM

## 2024-01-17 DIAGNOSIS — M25.561 ACUTE PAIN OF RIGHT KNEE: Primary | ICD-10-CM

## 2024-01-17 DIAGNOSIS — E66.9 OBESITY (BMI 30-39.9): ICD-10-CM

## 2024-01-17 DIAGNOSIS — M25.561 POSTERIOR RIGHT KNEE PAIN: ICD-10-CM

## 2024-01-17 PROCEDURE — 99204 OFFICE O/P NEW MOD 45 MIN: CPT | Performed by: PHYSICIAN ASSISTANT

## 2024-01-17 PROCEDURE — 73560 X-RAY EXAM OF KNEE 1 OR 2: CPT | Performed by: PHYSICIAN ASSISTANT

## 2024-01-17 PROCEDURE — A6449 LT COMPRES BAND >=3" <5"/YD: HCPCS | Performed by: PHYSICIAN ASSISTANT

## 2024-01-17 PROCEDURE — 93971 EXTREMITY STUDY: CPT | Performed by: PHYSICIAN ASSISTANT

## 2024-01-17 NOTE — ED INITIAL ASSESSMENT (HPI)
Pt states has been having knee pain since December pt denies any trauma to the area. Pt states pain behind knee and unable to bend it. Pt states hx of RA. Pt states reached out too pcp but not in office until next week. Pt was told to come in for imaging.
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
## 2024-01-25 ENCOUNTER — TELEPHONE (OUTPATIENT)
Dept: FAMILY MEDICINE CLINIC | Facility: CLINIC | Age: 64
End: 2024-01-25

## 2024-01-25 NOTE — TELEPHONE ENCOUNTER
methotrexate 2.5 MG Oral Tab, Take 8 tablets (20 mg total) by mouth once a week., Disp: 96 tablet, Rfl: 3

## 2024-02-01 ENCOUNTER — MED REC SCAN ONLY (OUTPATIENT)
Dept: INTERNAL MEDICINE CLINIC | Facility: CLINIC | Age: 64
End: 2024-02-01

## 2024-02-19 ENCOUNTER — TELEPHONE (OUTPATIENT)
Dept: INTERNAL MEDICINE CLINIC | Facility: CLINIC | Age: 64
End: 2024-02-19

## 2024-02-19 NOTE — TELEPHONE ENCOUNTER
[facility] [place], [phone]   Fax: [phone]    Please fax them a bilateral diagnostic mammogram order with ultrasound to followup if needed from Dr Anderson    Pt has appt 2-27-56

## 2024-03-19 PROBLEM — J06.9 URI, ACUTE: Status: RESOLVED | Noted: 2019-12-10 | Resolved: 2024-03-19

## 2024-03-26 ENCOUNTER — OFFICE VISIT (OUTPATIENT)
Dept: INTERNAL MEDICINE CLINIC | Facility: CLINIC | Age: 64
End: 2024-03-26

## 2024-03-26 VITALS
WEIGHT: 190 LBS | HEIGHT: 63 IN | HEART RATE: 70 BPM | TEMPERATURE: 98 F | SYSTOLIC BLOOD PRESSURE: 136 MMHG | DIASTOLIC BLOOD PRESSURE: 80 MMHG | BODY MASS INDEX: 33.66 KG/M2 | OXYGEN SATURATION: 98 %

## 2024-03-26 DIAGNOSIS — E11.9 DIABETES MELLITUS WITHOUT COMPLICATION (HCC): ICD-10-CM

## 2024-03-26 DIAGNOSIS — I10 ESSENTIAL HYPERTENSION, BENIGN: Primary | ICD-10-CM

## 2024-03-26 DIAGNOSIS — B36.9 FUNGAL DERMATITIS: ICD-10-CM

## 2024-03-26 PROCEDURE — 3079F DIAST BP 80-89 MM HG: CPT | Performed by: INTERNAL MEDICINE

## 2024-03-26 PROCEDURE — 99214 OFFICE O/P EST MOD 30 MIN: CPT | Performed by: INTERNAL MEDICINE

## 2024-03-26 PROCEDURE — 3008F BODY MASS INDEX DOCD: CPT | Performed by: INTERNAL MEDICINE

## 2024-03-26 PROCEDURE — 3075F SYST BP GE 130 - 139MM HG: CPT | Performed by: INTERNAL MEDICINE

## 2024-03-26 RX ORDER — CLOTRIMAZOLE AND BETAMETHASONE DIPROPIONATE 10; .64 MG/G; MG/G
CREAM TOPICAL
Qty: 15 G | Refills: 3 | Status: SHIPPED | OUTPATIENT
Start: 2024-03-26

## 2024-03-26 RX ORDER — TRIAMCINOLONE ACETONIDE 1 MG/G
OINTMENT TOPICAL
Qty: 450 G | Refills: 3 | Status: SHIPPED | OUTPATIENT
Start: 2024-03-26

## 2024-03-26 RX ORDER — OMALIZUMAB 150 MG/ML
INJECTION, SOLUTION SUBCUTANEOUS
COMMUNITY
Start: 2024-03-11

## 2024-03-26 RX ORDER — FLUOCINOLONE ACETONIDE 0.25 MG/G
OINTMENT TOPICAL 2 TIMES DAILY PRN
COMMUNITY
Start: 2024-03-20

## 2024-03-26 NOTE — PROGRESS NOTES
HPI:    Patient ID: Yadira Basilio is a 63 year old female.    HPIpatient has a crack in the skin under the left 5th toe, same happens on the right .   Spectazole didn't help .   Will see podiatry soon .   Has had changes in biologic meds.     Review of Systems   Constitutional:  Negative for activity change, appetite change, chills, fatigue and fever.   HENT:  Negative for ear pain, hearing loss, nosebleeds, postnasal drip, rhinorrhea, sneezing and tinnitus.    Eyes:  Negative for pain, discharge, redness, itching and visual disturbance.   Respiratory:  Negative for apnea, cough, chest tightness, shortness of breath and wheezing.    Cardiovascular:  Negative for chest pain, palpitations and leg swelling.   Gastrointestinal:  Negative for abdominal distention, blood in stool, constipation, diarrhea and nausea.   Endocrine: Negative for cold intolerance and heat intolerance.   Genitourinary:  Negative for difficulty urinating, dysuria, flank pain, frequency, genital sores, hematuria and urgency.   Musculoskeletal:  Negative for neck pain and neck stiffness.   Skin: Negative.    Allergic/Immunologic: Negative for immunocompromised state.   Neurological:  Negative for dizziness, syncope, facial asymmetry, weakness, light-headedness, numbness and headaches.   Psychiatric/Behavioral: Negative.              Current Outpatient Medications   Medication Sig Dispense Refill    Fluocinolone Acetonide 0.025 % External Ointment Apply topically 2 (two) times daily as needed.      XOLAIR 150 MG/ML Subcutaneous Solution Prefilled Syringe       clotrimazole-betamethasone 1-0.05 % External Cream Apply bid 15 g 3    triamcinolone 0.1 % External Ointment Apply bid 450 g 3    folic acid 1 MG Oral Tab Take 1 tablet (1 mg total) by mouth daily. 90 tablet 3    lisinopril 20 MG Oral Tab Take 1 tablet (20 mg total) by mouth daily. 90 tablet 3    ergocalciferol 1.25 MG (89507 UT) Oral Cap Take 1 capsule (50,000 Units total) by mouth once a  week. 12 capsule 3    hydroxychloroquine 200 MG Oral Tab TAKE 1 TABLET BY MOUTH TWICE DAILY 180 tablet 3    atorvastatin 20 MG Oral Tab Take 1 tablet (20 mg total) by mouth daily. 90 tablet 3    hydroCHLOROthiazide 12.5 MG Oral Cap Take 1 capsule (12.5 mg total) by mouth daily. 90 capsule 3    methotrexate 2.5 MG Oral Tab Take 8 tablets (20 mg total) by mouth once a week. 96 tablet 3    amLODIPine 5 MG Oral Tab Take 1 tablet (5 mg total) by mouth daily. 90 tablet 3    gabapentin 300 MG Oral Cap Take 1 capsule (300 mg total) by mouth 3 (three) times daily.      carisoprodol 350 MG Oral Tab Take 1 tablet (350 mg total) by mouth 2 (two) times daily as needed for Muscle spasms. 60 tablet 3    metFORMIN  MG Oral Tablet 24 Hr Take 1 tablet (500 mg total) by mouth 2 (two) times daily with meals. 180 tablet 3    Econazole Nitrate 1 % External Cream Apply daily 15 g 3    triamcinolone 0.1 % External Ointment APPLY TOPICALLY TWO TIMES DAILY. USE AS DIRECTED. APPLICATION SITE: TO AFFECTED AREAS      METOPROLOL SUCCINATE 25 MG Oral Tablet 24 Hr TAKE 1 TABLET BY MOUTH EVERY DAY 90 tablet 1    fexofenadine 180 MG Oral Tab Take 1 tablet (180 mg total) by mouth daily as needed. 90 tablet 3    hydrocortisone 2.5 % External Cream APPLY BID 28 g 2    Cetirizine HCl (ZYRTEC ALLERGY) 10 MG Oral Cap Take 10 mg by mouth daily as needed.        Betamethasone Dipropionate Aug (DIPROLENE) 0.05 % External Ointment Apply bid 15 g 1    DiphenhydrAMINE HCl 25 MG Oral Cap Take 1 capsule (25 mg total) by mouth every 3 (three) hours as needed.      predniSONE 5 MG Oral Tab TAKE 1 TABLET BY MOUTH EVERY MORNING AND TAKE 2 TABS EVERY EVENING AT 4      HYDROmorphone PF 50 mg/5mL Injection Solution       Glucose Blood (ONETOUCH VERIO) In Vitro Strip Check blood sugar 3 times a week 50 strip 3    Lancets Does not apply Misc Check blood sugar 3 times a week (Patient not taking: Reported on 1/18/2022) 50 each 3    meclizine 25 MG Oral Tab Take 1  tablet (25 mg total) by mouth 3 (three) times daily as needed for Dizziness. (Patient not taking: Reported on 12/13/2022) 30 tablet 0    EPINEPHrine (EPIPEN 2-ROSANNE) 0.3 MG/0.3ML Injection Solution Auto-injector Inject 0.3 mL (1 each total) into the skin daily as needed. (Patient not taking: Reported on 1/18/2022) 2 each 2    sodium chloride 0.9% SOLN with tocilizumab 200 MG/10ML SOLN 4 mg/kg Inject 8 mg/kg into the vein once.      Glucose Blood In Vitro Strip take by Intradermal route check  glucose 3 times a day (Patient not taking: Reported on 1/18/2022)       Allergies:  Allergies   Allergen Reactions    Acetaminophen HIVES     Other reaction(s): ACETAMINOPHEN    Adhesive Tape (Rosins) HIVES     Other reaction(s): TAPE, OCCLUSIVE ADHESIVE  Other reaction(s): BANDAGES, LIGHT-WEIGHT    Aspirin HIVES and UNKNOWN     Other reaction(s): ASPIRIN  Other reaction(s): ASPIRIN    Clindamycin HIVES and UNKNOWN     Other reaction(s): CLINDAMYCIN    Codeine HIVES     Other reaction(s): CODEINE  Other reaction(s): CODEINE PHOSPHATE    Erythromycin HIVES     Other reaction(s): ERYTHROMYCIN BASE  Other reaction(s): ERYTHROMYCIN BASE    Lansoprazole HIVES     Other reaction(s): LANSOPRAZOLE  Other reaction(s): LANSOPRAZOLE    Latex HIVES     Other reaction(s): LATEX  Other reaction(s): Unknown    Levofloxacin HIVES     Other reaction(s): LEVOFLOXACIN  Other reaction(s): LEVOFLOXACIN    Morphine HIVES     Other reaction(s): MORPHINE    Penicillins HIVES     Other reaction(s): PENICILLINS  Other reaction(s): PENICILLINS    Tramadol Hcl HIVES     Other reaction(s): TRAMADOL HCL    Tuna Oil [Fish Oil] ANAPHYLAXIS    Caffeine      Other reaction(s): CAFFEINE    Fish ITCHING     Other reaction(s): FISH CONTAINING PRODUCTS    Nickel RASH      PHYSICAL EXAM:   /80   Pulse 70   Temp 98 °F (36.7 °C)   Ht 5' 3\" (1.6 m)   Wt 190 lb (86.2 kg)   SpO2 98%   BMI 33.66 kg/m²      Physical Exam  Constitutional:       Appearance: She is  well-developed. She is obese.   Eyes:      Pupils: Pupils are equal, round, and reactive to light.   Neck:      Thyroid: No thyromegaly.   Cardiovascular:      Rate and Rhythm: Normal rate and regular rhythm.      Heart sounds: Normal heart sounds. No murmur heard.     No gallop.   Pulmonary:      Effort: Pulmonary effort is normal. No respiratory distress.      Breath sounds: Normal breath sounds. No wheezing or rales.   Chest:      Chest wall: No tenderness.   Abdominal:      General: There is no distension.      Palpations: There is no mass.      Tenderness: There is no abdominal tenderness. There is no guarding or rebound.   Musculoskeletal:         General: No tenderness.      Cervical back: Normal range of motion.   Skin:     General: Skin is warm.      Coloration: Skin is not pale.      Findings: No erythema or rash.      Comments: Fungal changes fifth toe proximal plantar base.  Eczema on other toes.    Neurological:      Mental Status: She is alert and oriented to person, place, and time.      Motor: No abnormal muscle tone.      Coordination: Coordination normal.   Psychiatric:         Behavior: Behavior normal.         Thought Content: Thought content normal.         Judgment: Judgment normal.                ASSESSMENT/PLAN:   Fungal dermatitis  Under 5 th toe.     Essential hypertension, benign  Bp is controlled.     Diabetes mellitus without complication (HCC)  Controlled.     No orders of the defined types were placed in this encounter.      Meds This Visit:  Requested Prescriptions     Signed Prescriptions Disp Refills    clotrimazole-betamethasone 1-0.05 % External Cream 15 g 3     Sig: Apply bid    triamcinolone 0.1 % External Ointment 450 g 3     Sig: Apply bid       Imaging & Referrals:  None       ID#[id number]

## 2024-03-27 ENCOUNTER — NURSE TRIAGE (OUTPATIENT)
Dept: INTERNAL MEDICINE CLINIC | Facility: CLINIC | Age: 64
End: 2024-03-27

## 2024-03-27 NOTE — TELEPHONE ENCOUNTER
Action Requested: Summary for Provider     []  Critical Lab, Recommendations Needed  [x] Need Additional Advice  []   FYI    []   Need Orders  [x] Need Medications Sent to Pharmacy  []  Other     SUMMARY: pt requesting medication - stated was seen yesterday but forgot to mention she has leg cramps nightly, tried pain meds otc, no relief, will not use topicals because she has sensitive skin    Reason for call: Leg Pain  Onset: nightly                   Reason for Disposition   Leg pain    Protocols used: Leg Pain-A-[place]

## 2024-03-28 NOTE — TELEPHONE ENCOUNTER
Dr. Anderson, please see below and advise. Thank you.    Spoke to patient, she is returning a phone call (name and  of patient verified). Reports leg pain does feel cramp-like. She has not tried magnesium

## 2024-04-22 ENCOUNTER — TELEPHONE (OUTPATIENT)
Dept: INTERNAL MEDICINE CLINIC | Facility: CLINIC | Age: 64
End: 2024-04-22

## 2024-04-23 RX ORDER — METHOTREXATE 2.5 MG/1
20 TABLET ORAL WEEKLY
Qty: 96 TABLET | Refills: 3 | Status: CANCELLED | OUTPATIENT
Start: 2024-04-23

## 2024-04-23 NOTE — TELEPHONE ENCOUNTER
Please call patient and verify where patient wants her methotrexate. A one year supply was sent to [facility] in [place] on 01/02/2024. Getting request from [facility] mail order.    Requested Prescriptions     Pending Prescriptions Disp Refills    methotrexate 2.5 MG Oral Tab 96 tablet 3     Sig: Take 8 tablets (20 mg total) by mouth once a week.

## 2024-04-25 ENCOUNTER — TELEPHONE (OUTPATIENT)
Dept: INTERNAL MEDICINE CLINIC | Facility: CLINIC | Age: 64
End: 2024-04-25

## 2024-04-25 ENCOUNTER — MED REC SCAN ONLY (OUTPATIENT)
Dept: INTERNAL MEDICINE CLINIC | Facility: CLINIC | Age: 64
End: 2024-04-25

## 2024-06-04 ENCOUNTER — OFFICE VISIT (OUTPATIENT)
Dept: INTERNAL MEDICINE CLINIC | Facility: CLINIC | Age: 64
End: 2024-06-04
Payer: COMMERCIAL

## 2024-06-04 VITALS
OXYGEN SATURATION: 99 % | BODY MASS INDEX: 33.31 KG/M2 | SYSTOLIC BLOOD PRESSURE: 130 MMHG | HEART RATE: 60 BPM | WEIGHT: 188 LBS | HEIGHT: 63 IN | DIASTOLIC BLOOD PRESSURE: 80 MMHG | TEMPERATURE: 98 F

## 2024-06-04 DIAGNOSIS — E78.00 PURE HYPERCHOLESTEROLEMIA: ICD-10-CM

## 2024-06-04 DIAGNOSIS — I10 ESSENTIAL HYPERTENSION, BENIGN: Primary | ICD-10-CM

## 2024-06-04 DIAGNOSIS — E11.9 DIABETES MELLITUS WITHOUT COMPLICATION (HCC): ICD-10-CM

## 2024-06-04 PROCEDURE — 99214 OFFICE O/P EST MOD 30 MIN: CPT | Performed by: INTERNAL MEDICINE

## 2024-06-04 PROCEDURE — 3079F DIAST BP 80-89 MM HG: CPT | Performed by: INTERNAL MEDICINE

## 2024-06-04 PROCEDURE — 3008F BODY MASS INDEX DOCD: CPT | Performed by: INTERNAL MEDICINE

## 2024-06-04 PROCEDURE — 3075F SYST BP GE 130 - 139MM HG: CPT | Performed by: INTERNAL MEDICINE

## 2024-06-04 RX ORDER — METFORMIN HYDROCHLORIDE 500 MG/1
500 TABLET, EXTENDED RELEASE ORAL 2 TIMES DAILY WITH MEALS
Qty: 180 TABLET | Refills: 3 | Status: SHIPPED | OUTPATIENT
Start: 2024-06-04

## 2024-06-04 NOTE — PROGRESS NOTES
Hypertension  This is a chronic problem. The current episode started more than 1 year ago. The problem has been gradually worsening since onset. The problem is uncontrolled. Pertinent negatives include no anxiety, blurred vision, chest pain, headaches, malaise/fatigue, neck pain, orthopnea, palpitations, peripheral edema, PND or shortness of breath. There are no associated agents to hypertension. Risk factors for coronary artery disease include diabetes mellitus and dyslipidemia. Past treatments include ACE inhibitors and calcium channel blockers. The current treatment provides moderate improvement. Compliance problems include exercise.  There is no history of angina, kidney disease, CAD/MI, CVA, heart failure, left ventricular hypertrophy, PVD or retinopathy. HPI:HPI:HPI:   HPI:    Patient ID: Yadira Basilio is a 63 year old female.    HPI    Review of Systems   Constitutional:  Negative for activity change, appetite change, chills, fatigue and fever.   HENT:  Negative for ear pain, hearing loss, nosebleeds, postnasal drip, rhinorrhea, sneezing and tinnitus.    Eyes:  Negative for pain, discharge, redness, itching and visual disturbance.   Respiratory:  Negative for apnea, cough, chest tightness, shortness of breath and wheezing.    Cardiovascular:  Negative for chest pain, palpitations and leg swelling.   Gastrointestinal:  Negative for abdominal distention, blood in stool, constipation, diarrhea and nausea.   Endocrine: Negative for cold intolerance and heat intolerance.   Genitourinary:  Negative for difficulty urinating, dysuria, flank pain, frequency, genital sores, hematuria and urgency.   Musculoskeletal:  Negative for neck pain and neck stiffness.   Skin: Negative.    Allergic/Immunologic: Negative for immunocompromised state.   Neurological:  Negative for dizziness, syncope, facial asymmetry, weakness, light-headedness, numbness and headaches.   Psychiatric/Behavioral: Negative.              Current  Outpatient Medications   Medication Sig Dispense Refill    metFORMIN  MG Oral Tablet 24 Hr Take 1 tablet (500 mg total) by mouth 2 (two) times daily with meals. 180 tablet 3    Fluocinolone Acetonide 0.025 % External Ointment Apply topically 2 (two) times daily as needed.      XOLAIR 150 MG/ML Subcutaneous Solution Prefilled Syringe       clotrimazole-betamethasone 1-0.05 % External Cream Apply bid 15 g 3    triamcinolone 0.1 % External Ointment Apply bid 450 g 3    folic acid 1 MG Oral Tab Take 1 tablet (1 mg total) by mouth daily. 90 tablet 3    lisinopril 20 MG Oral Tab Take 1 tablet (20 mg total) by mouth daily. 90 tablet 3    ergocalciferol 1.25 MG (20174 UT) Oral Cap Take 1 capsule (50,000 Units total) by mouth once a week. 12 capsule 3    hydroxychloroquine 200 MG Oral Tab TAKE 1 TABLET BY MOUTH TWICE DAILY 180 tablet 3    atorvastatin 20 MG Oral Tab Take 1 tablet (20 mg total) by mouth daily. 90 tablet 3    hydroCHLOROthiazide 12.5 MG Oral Cap Take 1 capsule (12.5 mg total) by mouth daily. 90 capsule 3    methotrexate 2.5 MG Oral Tab Take 8 tablets (20 mg total) by mouth once a week. 96 tablet 3    amLODIPine 5 MG Oral Tab Take 1 tablet (5 mg total) by mouth daily. 90 tablet 3    gabapentin 300 MG Oral Cap Take 1 capsule (300 mg total) by mouth 3 (three) times daily.      carisoprodol 350 MG Oral Tab Take 1 tablet (350 mg total) by mouth 2 (two) times daily as needed for Muscle spasms. 60 tablet 3    HYDROmorphone PF 50 mg/5mL Injection Solution       Econazole Nitrate 1 % External Cream Apply daily 15 g 3    triamcinolone 0.1 % External Ointment APPLY TOPICALLY TWO TIMES DAILY. USE AS DIRECTED. APPLICATION SITE: TO AFFECTED AREAS      METOPROLOL SUCCINATE 25 MG Oral Tablet 24 Hr TAKE 1 TABLET BY MOUTH EVERY DAY 90 tablet 1    fexofenadine 180 MG Oral Tab Take 1 tablet (180 mg total) by mouth daily as needed. 90 tablet 3    hydrocortisone 2.5 % External Cream APPLY BID 28 g 2    Cetirizine HCl (ZYRTEC  ALLERGY) 10 MG Oral Cap Take 10 mg by mouth daily as needed.        Betamethasone Dipropionate Aug (DIPROLENE) 0.05 % External Ointment Apply bid 15 g 1    DiphenhydrAMINE HCl 25 MG Oral Cap Take 1 capsule (25 mg total) by mouth every 3 (three) hours as needed.      Glucose Blood (ONETOUCH VERIO) In Vitro Strip Check blood sugar 3 times a week 50 strip 3    Lancets Does not apply Misc Check blood sugar 3 times a week (Patient not taking: Reported on 1/18/2022) 50 each 3    meclizine 25 MG Oral Tab Take 1 tablet (25 mg total) by mouth 3 (three) times daily as needed for Dizziness. (Patient not taking: Reported on 12/13/2022) 30 tablet 0    EPINEPHrine (EPIPEN 2-ROSANNE) 0.3 MG/0.3ML Injection Solution Auto-injector Inject 0.3 mL (1 each total) into the skin daily as needed. (Patient not taking: Reported on 1/18/2022) 2 each 2    sodium chloride 0.9% SOLN with tocilizumab 200 MG/10ML SOLN 4 mg/kg Inject 8 mg/kg into the vein once.      Glucose Blood In Vitro Strip take by Intradermal route check  glucose 3 times a day (Patient not taking: Reported on 1/18/2022)       Allergies:  Allergies   Allergen Reactions    Acetaminophen HIVES     Other reaction(s): ACETAMINOPHEN    Adhesive Tape (Rosins) HIVES     Other reaction(s): TAPE, OCCLUSIVE ADHESIVE  Other reaction(s): BANDAGES, LIGHT-WEIGHT    Aspirin HIVES and UNKNOWN     Other reaction(s): ASPIRIN  Other reaction(s): ASPIRIN    Clindamycin HIVES and UNKNOWN     Other reaction(s): CLINDAMYCIN    Codeine HIVES     Other reaction(s): CODEINE  Other reaction(s): CODEINE PHOSPHATE    Erythromycin HIVES     Other reaction(s): ERYTHROMYCIN BASE  Other reaction(s): ERYTHROMYCIN BASE    Lansoprazole HIVES     Other reaction(s): LANSOPRAZOLE  Other reaction(s): LANSOPRAZOLE    Latex HIVES     Other reaction(s): LATEX  Other reaction(s): Unknown    Levofloxacin HIVES     Other reaction(s): LEVOFLOXACIN  Other reaction(s): LEVOFLOXACIN    Morphine HIVES     Other reaction(s):  MORPHINE    Penicillins HIVES     Other reaction(s): PENICILLINS  Other reaction(s): PENICILLINS    Tramadol Hcl HIVES     Other reaction(s): TRAMADOL HCL    Tuna Oil [Fish Oil] ANAPHYLAXIS    Caffeine      Other reaction(s): CAFFEINE    Fish ITCHING     Other reaction(s): FISH CONTAINING PRODUCTS    Nickel RASH      PHYSICAL EXAM:   /80   Pulse 60   Temp 97.6 °F (36.4 °C)   Ht 5' 3\" (1.6 m)   Wt 188 lb (85.3 kg)   SpO2 99%   BMI 33.30 kg/m²      Physical Exam  Constitutional:       Appearance: She is well-developed.   HENT:      Head: Normocephalic and atraumatic.      Right Ear: Tympanic membrane normal.      Left Ear: Tympanic membrane normal.      Mouth/Throat:      Mouth: Mucous membranes are dry.      Pharynx: Oropharynx is clear.   Eyes:      Pupils: Pupils are equal, round, and reactive to light.   Neck:      Thyroid: No thyromegaly.   Cardiovascular:      Rate and Rhythm: Normal rate and regular rhythm.      Heart sounds: Normal heart sounds. No murmur heard.     No gallop.   Pulmonary:      Effort: Pulmonary effort is normal. No respiratory distress.      Breath sounds: Normal breath sounds. No wheezing or rales.   Chest:      Chest wall: No tenderness.   Abdominal:      General: There is no distension.      Palpations: There is no mass.      Tenderness: There is no abdominal tenderness. There is no guarding or rebound.      Hernia: No hernia is present.   Musculoskeletal:         General: No tenderness.      Cervical back: Normal range of motion.   Skin:     General: Skin is warm.      Coloration: Skin is not pale.      Findings: No erythema or rash.   Neurological:      Mental Status: She is alert and oriented to person, place, and time.      Motor: No abnormal muscle tone.      Coordination: Coordination normal.   Psychiatric:         Mood and Affect: Mood normal.         Behavior: Behavior normal.         Thought Content: Thought content normal.         Judgment: Judgment normal.                 ASSESSMENT/PLAN:   Pure hypercholesterolemia  Lipids are stable.     Essential hypertension, benign  Bp is controlled.     Diabetes mellitus without complication (HCC)  Glucose is controlled.     No orders of the defined types were placed in this encounter.      Meds This Visit:  Requested Prescriptions     Signed Prescriptions Disp Refills    metFORMIN  MG Oral Tablet 24 Hr 180 tablet 3     Sig: Take 1 tablet (500 mg total) by mouth 2 (two) times daily with meals.       Imaging & Referrals:  None       ID#[id number]

## 2024-07-25 DIAGNOSIS — M05.772 RHEUMATOID ARTHRITIS INVOLVING BOTH FEET WITH POSITIVE RHEUMATOID FACTOR (HCC): Primary | ICD-10-CM

## 2024-07-25 DIAGNOSIS — M05.771 RHEUMATOID ARTHRITIS INVOLVING BOTH FEET WITH POSITIVE RHEUMATOID FACTOR (HCC): Primary | ICD-10-CM

## 2024-07-29 RX ORDER — CARISOPRODOL 350 MG/1
TABLET ORAL
Qty: 60 TABLET | Refills: 0 | Status: SHIPPED | OUTPATIENT
Start: 2024-07-29

## 2024-08-03 RX ORDER — METHOTREXATE 2.5 MG/1
20 TABLET ORAL WEEKLY
Qty: 96 TABLET | Refills: 3 | OUTPATIENT
Start: 2024-08-03

## 2024-08-03 NOTE — TELEPHONE ENCOUNTER
Duplicate request, previously addressed.      Too soon     Disp Refills Start End    methotrexate 2.5 MG Oral Tab 96 tablet 3 1/2/2024 --    Sig - Route: Take 8 tablets (20 mg total) by mouth once a week. - Oral    Sent to pharmacy as: Methotrexate Sodium 2.5 MG Oral Tablet (Rheumatrex)    E-Prescribing Status: Receipt confirmed by pharmacy (1/2/2024  3:35 PM CST)      Pharmacy    [id number] - [place], [place] - [address] [place] [phone], [phone]

## 2024-09-10 ENCOUNTER — OFFICE VISIT (OUTPATIENT)
Dept: INTERNAL MEDICINE CLINIC | Facility: CLINIC | Age: 64
End: 2024-09-10

## 2024-09-10 VITALS
TEMPERATURE: 98 F | SYSTOLIC BLOOD PRESSURE: 126 MMHG | BODY MASS INDEX: 31.71 KG/M2 | DIASTOLIC BLOOD PRESSURE: 72 MMHG | HEART RATE: 76 BPM | HEIGHT: 63 IN | WEIGHT: 179 LBS | OXYGEN SATURATION: 98 %

## 2024-09-10 DIAGNOSIS — E11.9 DIABETES MELLITUS WITHOUT COMPLICATION (HCC): ICD-10-CM

## 2024-09-10 DIAGNOSIS — I10 ESSENTIAL HYPERTENSION, BENIGN: Primary | ICD-10-CM

## 2024-09-10 PROCEDURE — 3008F BODY MASS INDEX DOCD: CPT | Performed by: INTERNAL MEDICINE

## 2024-09-10 PROCEDURE — 99214 OFFICE O/P EST MOD 30 MIN: CPT | Performed by: INTERNAL MEDICINE

## 2024-09-10 PROCEDURE — 3074F SYST BP LT 130 MM HG: CPT | Performed by: INTERNAL MEDICINE

## 2024-09-10 PROCEDURE — 3078F DIAST BP <80 MM HG: CPT | Performed by: INTERNAL MEDICINE

## 2024-09-10 NOTE — PROGRESS NOTES
HPI:    Patient ID: Yadira Basilio is a 64 year old female.    HPIpatient is doing well, she had been caring for her mother who passed away the end of August , age 85.   No chest pain , no shortness of breath , no swelling.   She had fallen and hurt her back it is gradually improving .  Will do hgba1c in one month . She has lost 9 lbs.     Review of Systems   Constitutional:  Negative for activity change, appetite change, chills, fatigue and fever.   HENT:  Negative for ear pain, hearing loss, nosebleeds, postnasal drip, rhinorrhea, sneezing and tinnitus.    Eyes:  Negative for pain, discharge, redness, itching and visual disturbance.   Respiratory:  Negative for apnea, cough, chest tightness, shortness of breath and wheezing.    Cardiovascular:  Negative for chest pain, palpitations and leg swelling.   Gastrointestinal:  Negative for abdominal distention, blood in stool, constipation, diarrhea and nausea.   Endocrine: Negative for cold intolerance and heat intolerance.   Genitourinary:  Negative for difficulty urinating, dysuria, flank pain, frequency, genital sores, hematuria and urgency.   Musculoskeletal:  Negative for neck pain and neck stiffness.   Skin: Negative.    Allergic/Immunologic: Negative for immunocompromised state.   Neurological:  Negative for dizziness, syncope, facial asymmetry, weakness, light-headedness, numbness and headaches.   Psychiatric/Behavioral: Negative.              Current Outpatient Medications   Medication Sig Dispense Refill    carisoprodol 350 MG Oral Tab TAKE 1 TABLET (350 MG TOTAL) BY MOUTH TWICE A DAY AS NEEDED FOR MUSCLE SPASM 60 tablet 0    metFORMIN  MG Oral Tablet 24 Hr Take 1 tablet (500 mg total) by mouth 2 (two) times daily with meals. 180 tablet 3    folic acid 1 MG Oral Tab Take 1 tablet (1 mg total) by mouth daily. 90 tablet 3    lisinopril 20 MG Oral Tab Take 1 tablet (20 mg total) by mouth daily. 90 tablet 3    ergocalciferol 1.25 MG (67704 UT) Oral Cap Take 1  capsule (50,000 Units total) by mouth once a week. 12 capsule 3    hydroxychloroquine 200 MG Oral Tab TAKE 1 TABLET BY MOUTH TWICE DAILY 180 tablet 3    atorvastatin 20 MG Oral Tab Take 1 tablet (20 mg total) by mouth daily. 90 tablet 3    hydroCHLOROthiazide 12.5 MG Oral Cap Take 1 capsule (12.5 mg total) by mouth daily. 90 capsule 3    methotrexate 2.5 MG Oral Tab Take 8 tablets (20 mg total) by mouth once a week. 96 tablet 3    amLODIPine 5 MG Oral Tab Take 1 tablet (5 mg total) by mouth daily. 90 tablet 3    gabapentin 300 MG Oral Cap Take 1 capsule (300 mg total) by mouth 3 (three) times daily.      METOPROLOL SUCCINATE 25 MG Oral Tablet 24 Hr TAKE 1 TABLET BY MOUTH EVERY DAY 90 tablet 1    fexofenadine 180 MG Oral Tab Take 1 tablet (180 mg total) by mouth daily as needed. 90 tablet 3    Cetirizine HCl (ZYRTEC ALLERGY) 10 MG Oral Cap Take 10 mg by mouth daily as needed.        DiphenhydrAMINE HCl 25 MG Oral Cap Take 1 capsule (25 mg total) by mouth every 3 (three) hours as needed.      Fluocinolone Acetonide 0.025 % External Ointment Apply topically 2 (two) times daily as needed.      XOLAIR 150 MG/ML Subcutaneous Solution Prefilled Syringe       clotrimazole-betamethasone 1-0.05 % External Cream Apply bid 15 g 3    triamcinolone 0.1 % External Ointment Apply bid 450 g 3    HYDROmorphone PF 50 mg/5mL Injection Solution       Econazole Nitrate 1 % External Cream Apply daily 15 g 3    triamcinolone 0.1 % External Ointment APPLY TOPICALLY TWO TIMES DAILY. USE AS DIRECTED. APPLICATION SITE: TO AFFECTED AREAS      Glucose Blood (ONETOUCH VERIO) In Vitro Strip Check blood sugar 3 times a week 50 strip 3    Lancets Does not apply Misc Check blood sugar 3 times a week (Patient not taking: Reported on 1/18/2022) 50 each 3    meclizine 25 MG Oral Tab Take 1 tablet (25 mg total) by mouth 3 (three) times daily as needed for Dizziness. (Patient not taking: Reported on 12/13/2022) 30 tablet 0    EPINEPHrine (EPIPEN 2-ROSANNE)  0.3 MG/0.3ML Injection Solution Auto-injector Inject 0.3 mL (1 each total) into the skin daily as needed. (Patient not taking: Reported on 1/18/2022) 2 each 2    hydrocortisone 2.5 % External Cream APPLY BID 28 g 2    sodium chloride 0.9% SOLN with tocilizumab 200 MG/10ML SOLN 4 mg/kg Inject 8 mg/kg into the vein once.      Betamethasone Dipropionate Aug (DIPROLENE) 0.05 % External Ointment Apply bid 15 g 1    Glucose Blood In Vitro Strip take by Intradermal route check  glucose 3 times a day (Patient not taking: Reported on 1/18/2022)       Allergies:  Allergies   Allergen Reactions    Acetaminophen HIVES     Other reaction(s): ACETAMINOPHEN    Adhesive Tape (Rosins) HIVES     Other reaction(s): TAPE, OCCLUSIVE ADHESIVE  Other reaction(s): BANDAGES, LIGHT-WEIGHT    Aspirin HIVES and UNKNOWN     Other reaction(s): ASPIRIN  Other reaction(s): ASPIRIN    Clindamycin HIVES and UNKNOWN     Other reaction(s): CLINDAMYCIN    Codeine HIVES     Other reaction(s): CODEINE  Other reaction(s): CODEINE PHOSPHATE    Erythromycin HIVES     Other reaction(s): ERYTHROMYCIN BASE  Other reaction(s): ERYTHROMYCIN BASE    Lansoprazole HIVES     Other reaction(s): LANSOPRAZOLE  Other reaction(s): LANSOPRAZOLE    Latex HIVES     Other reaction(s): LATEX  Other reaction(s): Unknown    Levofloxacin HIVES     Other reaction(s): LEVOFLOXACIN  Other reaction(s): LEVOFLOXACIN    Morphine HIVES     Other reaction(s): MORPHINE    Penicillins HIVES     Other reaction(s): PENICILLINS  Other reaction(s): PENICILLINS    Tramadol Hcl HIVES     Other reaction(s): TRAMADOL HCL    Tuna Oil [Fish Oil] ANAPHYLAXIS    Caffeine      Other reaction(s): CAFFEINE    Fish ITCHING     Other reaction(s): FISH CONTAINING PRODUCTS    Nickel RASH      PHYSICAL EXAM:   /72   Pulse 76   Temp 97.5 °F (36.4 °C)   Ht 5' 3\" (1.6 m)   Wt 179 lb (81.2 kg)   SpO2 98%   BMI 31.71 kg/m²      Physical Exam  Constitutional:       Appearance: Normal appearance. She is  well-developed.   HENT:      Head: Normocephalic and atraumatic.   Eyes:      Pupils: Pupils are equal, round, and reactive to light.   Neck:      Thyroid: No thyromegaly.   Cardiovascular:      Rate and Rhythm: Normal rate and regular rhythm.      Heart sounds: Normal heart sounds. No murmur heard.     No gallop.   Pulmonary:      Effort: Pulmonary effort is normal. No respiratory distress.      Breath sounds: Normal breath sounds. No wheezing or rales.   Chest:      Chest wall: No tenderness.   Abdominal:      General: There is no distension.      Palpations: There is no mass.      Tenderness: There is no abdominal tenderness. There is no guarding or rebound.   Musculoskeletal:         General: No tenderness.      Cervical back: Normal range of motion.   Skin:     General: Skin is warm.      Coloration: Skin is not pale.      Findings: No erythema or rash.   Neurological:      Mental Status: She is alert and oriented to person, place, and time.      Motor: No abnormal muscle tone.      Coordination: Coordination normal.   Psychiatric:         Behavior: Behavior normal.         Thought Content: Thought content normal.         Judgment: Judgment normal.                ASSESSMENT/PLAN:   Essential hypertension, benign  Bp is controlled. No changes.     Diabetes mellitus without complication (HCC)  Hgba1c soon .     Orders Placed This Encounter   Procedures    Hemoglobin A1C       Meds This Visit:  Requested Prescriptions      No prescriptions requested or ordered in this encounter       Imaging & Referrals:  None       ID#[id number]

## 2024-09-13 RX ORDER — METHOTREXATE 2.5 MG/1
20 TABLET ORAL WEEKLY
Qty: 96 TABLET | Refills: 3 | Status: SHIPPED | OUTPATIENT
Start: 2024-09-13

## 2024-09-13 NOTE — TELEPHONE ENCOUNTER
Please Review. Protocol Failed; No Protocol     Requested Prescriptions   Pending Prescriptions Disp Refills    methotrexate 2.5 MG Oral Tab 96 tablet 3     Sig: Take 8 tablets (20 mg total) by mouth once a week.       There is no refill protocol information for this order            Future Appointments         Provider Department Appt Notes    In 3 months Patrick Anderson MD [facility], [facility] 3 month f/u   Policy advised          Recent Outpatient Visits              3 days ago Essential hypertension, benign    [facility], [facility] Patrick Anderson MD    Office Visit    3 months ago Essential hypertension, [facility], [facility] Patrick Anderson MD    Office Visit    5 months ago Essential hypertension, [facility], [facility] Patrick Anderson MD    Office Visit    8 months ago Essential hypertension, [facility], [facility] Patrick Anderson MD    Office Visit    11 months ago Essential hypertension, [facility], [facility] Patrick Anderson MD    Office Visit

## 2024-10-05 DIAGNOSIS — M05.771 RHEUMATOID ARTHRITIS INVOLVING BOTH FEET WITH POSITIVE RHEUMATOID FACTOR (HCC): ICD-10-CM

## 2024-10-05 DIAGNOSIS — M05.772 RHEUMATOID ARTHRITIS INVOLVING BOTH FEET WITH POSITIVE RHEUMATOID FACTOR (HCC): ICD-10-CM

## 2024-10-07 RX ORDER — CARISOPRODOL 350 MG/1
TABLET ORAL
Qty: 60 TABLET | Refills: 3 | Status: SHIPPED | OUTPATIENT
Start: 2024-10-07

## 2024-10-07 NOTE — TELEPHONE ENCOUNTER
Please review; protocol failed/ has no protocol      Recent fills: 07/29/2024  Last Rx written: 07/29/2024  Last Office Visit: 09/10/2024    Recent Visits  Date Type Provider Dept   09/10/24 Office Visit Patrick Anderson MD [facility]     Future Appointments  Date Type Provider Dept   12/17/24 Appointment Patrick Anderson MD [facility]   Showing future appointments within next 150 days with a meds authorizing provider and meeting all other requirements      Requested Prescriptions   Pending Prescriptions Disp Refills    CARISOPRODOL 350 MG Oral Tab [Pharmacy Med Name: CARISOPRODOL 350 MG TABLET] 60 tablet 0     Sig: TAKE 1 TABLET (350 MG TOTAL) BY MOUTH TWICE A DAY AS NEEDED FOR MUSCLE SPASM       Controlled Substance Medication Failed - 10/5/2024  4:18 PM        Failed - This medication is a controlled substance - forward to provider to refill           Recent Outpatient Visits              3 weeks ago Essential hypertension, benign    [facility], [facility] Patrick Anderson MD    Office Visit    4 months ago Essential hypertension, [facility], [facility] Patrick Anderson MD    Office Visit    6 months ago Essential hypertension, benign    [facility] Patrick Anderson MD    Office Visit    9 months ago Essential hypertension, [facility] Patrick Anderson MD    Office Visit    1 year ago Essential hypertension, [facility] Patrick Anderson MD    Office Visit          Future Appointments         Provider Department Appt Notes    In 2 months Patrick Anderson MD [facility] 3 month f/u   Policy advised

## 2024-10-28 ENCOUNTER — TELEPHONE (OUTPATIENT)
Dept: INTERNAL MEDICINE CLINIC | Facility: CLINIC | Age: 64
End: 2024-10-28

## 2024-10-28 ENCOUNTER — MED REC SCAN ONLY (OUTPATIENT)
Dept: INTERNAL MEDICINE CLINIC | Facility: CLINIC | Age: 64
End: 2024-10-28

## 2024-12-17 ENCOUNTER — OFFICE VISIT (OUTPATIENT)
Dept: INTERNAL MEDICINE CLINIC | Facility: CLINIC | Age: 64
End: 2024-12-17

## 2024-12-17 ENCOUNTER — LAB ENCOUNTER (OUTPATIENT)
Dept: LAB | Age: 64
End: 2024-12-17
Attending: INTERNAL MEDICINE
Payer: COMMERCIAL

## 2024-12-17 VITALS
DIASTOLIC BLOOD PRESSURE: 70 MMHG | BODY MASS INDEX: 31.36 KG/M2 | OXYGEN SATURATION: 100 % | HEART RATE: 76 BPM | WEIGHT: 177 LBS | HEIGHT: 63 IN | SYSTOLIC BLOOD PRESSURE: 112 MMHG | TEMPERATURE: 98 F

## 2024-12-17 DIAGNOSIS — M05.771 RHEUMATOID ARTHRITIS INVOLVING BOTH FEET WITH POSITIVE RHEUMATOID FACTOR (HCC): ICD-10-CM

## 2024-12-17 DIAGNOSIS — Z12.31 VISIT FOR SCREENING MAMMOGRAM: ICD-10-CM

## 2024-12-17 DIAGNOSIS — E11.9 DIABETES MELLITUS WITHOUT COMPLICATION (HCC): ICD-10-CM

## 2024-12-17 DIAGNOSIS — I10 ESSENTIAL HYPERTENSION, BENIGN: Primary | ICD-10-CM

## 2024-12-17 DIAGNOSIS — M05.772 RHEUMATOID ARTHRITIS INVOLVING BOTH FEET WITH POSITIVE RHEUMATOID FACTOR (HCC): ICD-10-CM

## 2024-12-17 LAB
CREAT UR-SCNC: 107.8 MG/DL
MICROALBUMIN UR-MCNC: 0.9 MG/DL
MICROALBUMIN/CREAT 24H UR-RTO: 8.3 UG/MG (ref ?–30)

## 2024-12-17 PROCEDURE — 82570 ASSAY OF URINE CREATININE: CPT

## 2024-12-17 PROCEDURE — 3078F DIAST BP <80 MM HG: CPT | Performed by: INTERNAL MEDICINE

## 2024-12-17 PROCEDURE — 82043 UR ALBUMIN QUANTITATIVE: CPT

## 2024-12-17 PROCEDURE — 3008F BODY MASS INDEX DOCD: CPT | Performed by: INTERNAL MEDICINE

## 2024-12-17 PROCEDURE — 3074F SYST BP LT 130 MM HG: CPT | Performed by: INTERNAL MEDICINE

## 2024-12-17 PROCEDURE — 99214 OFFICE O/P EST MOD 30 MIN: CPT | Performed by: INTERNAL MEDICINE

## 2024-12-17 RX ORDER — CYCLOBENZAPRINE HCL 10 MG
TABLET ORAL NIGHTLY
COMMUNITY
Start: 2024-10-07 | End: 2024-12-17

## 2024-12-17 NOTE — PROGRESS NOTES
HPI:    Patient ID: Yadira Basilio is a 64 year old female.    HPIpatient is doing well, no chest pain , no shortness of breath , no swelling.   Her knee arthritis affects her going up and down stairs.   She gets her pain pump fillled at home but will switch back to having it done at the pain clinic. She plans to retire in a year.     Review of Systems   Constitutional:  Negative for activity change, appetite change, chills, fatigue and fever.   HENT:  Negative for ear pain, hearing loss, nosebleeds, postnasal drip, rhinorrhea, sneezing and tinnitus.    Eyes:  Negative for pain, discharge, redness, itching and visual disturbance.   Respiratory:  Negative for apnea, cough, chest tightness, shortness of breath and wheezing.    Cardiovascular:  Negative for chest pain, palpitations and leg swelling.   Gastrointestinal:  Negative for abdominal distention, blood in stool, constipation, diarrhea and nausea.   Endocrine: Negative for cold intolerance and heat intolerance.   Genitourinary:  Negative for difficulty urinating, dysuria, flank pain, frequency, genital sores, hematuria and urgency.   Musculoskeletal:  Positive for arthralgias and back pain. Negative for neck pain and neck stiffness.   Skin: Negative.    Allergic/Immunologic: Negative for immunocompromised state.   Neurological:  Negative for dizziness, syncope, facial asymmetry, weakness, light-headedness, numbness and headaches.   Psychiatric/Behavioral: Negative.              Current Outpatient Medications   Medication Sig Dispense Refill    carisoprodol 350 MG Oral Tab TAKE 1 TABLET (350 MG TOTAL) BY MOUTH TWICE A DAY AS NEEDED FOR MUSCLE SPASM 60 tablet 3    methotrexate 2.5 MG Oral Tab Take 8 tablets (20 mg total) by mouth once a week. 96 tablet 3    metFORMIN  MG Oral Tablet 24 Hr Take 1 tablet (500 mg total) by mouth 2 (two) times daily with meals. 180 tablet 3    XOLAIR 150 MG/ML Subcutaneous Solution Prefilled Syringe       folic acid 1 MG Oral Tab  Take 1 tablet (1 mg total) by mouth daily. 90 tablet 3    lisinopril 20 MG Oral Tab Take 1 tablet (20 mg total) by mouth daily. 90 tablet 3    ergocalciferol 1.25 MG (45567 UT) Oral Cap Take 1 capsule (50,000 Units total) by mouth once a week. 12 capsule 3    hydroxychloroquine 200 MG Oral Tab TAKE 1 TABLET BY MOUTH TWICE DAILY 180 tablet 3    atorvastatin 20 MG Oral Tab Take 1 tablet (20 mg total) by mouth daily. 90 tablet 3    amLODIPine 5 MG Oral Tab Take 1 tablet (5 mg total) by mouth daily. 90 tablet 3    gabapentin 300 MG Oral Cap Take 1 capsule (300 mg total) by mouth 3 (three) times daily.      METOPROLOL SUCCINATE 25 MG Oral Tablet 24 Hr TAKE 1 TABLET BY MOUTH EVERY DAY 90 tablet 1    fexofenadine 180 MG Oral Tab Take 1 tablet (180 mg total) by mouth daily as needed. 90 tablet 3    Cetirizine HCl (ZYRTEC ALLERGY) 10 MG Oral Cap Take 10 mg by mouth daily as needed.        DiphenhydrAMINE HCl 25 MG Oral Cap Take 1 capsule (25 mg total) by mouth every 3 (three) hours as needed.      Fluocinolone Acetonide 0.025 % External Ointment Apply topically 2 (two) times daily as needed.      clotrimazole-betamethasone 1-0.05 % External Cream Apply bid 15 g 3    triamcinolone 0.1 % External Ointment Apply bid 450 g 3    hydroCHLOROthiazide 12.5 MG Oral Cap Take 1 capsule (12.5 mg total) by mouth daily. 90 capsule 3    HYDROmorphone PF 50 mg/5mL Injection Solution       Econazole Nitrate 1 % External Cream Apply daily 15 g 3    triamcinolone 0.1 % External Ointment APPLY TOPICALLY TWO TIMES DAILY. USE AS DIRECTED. APPLICATION SITE: TO AFFECTED AREAS      Glucose Blood (ONETOUCH VERIO) In Vitro Strip Check blood sugar 3 times a week 50 strip 3    Lancets Does not apply Misc Check blood sugar 3 times a week (Patient not taking: Reported on 1/18/2022) 50 each 3    meclizine 25 MG Oral Tab Take 1 tablet (25 mg total) by mouth 3 (three) times daily as needed for Dizziness. (Patient not taking: Reported on 12/13/2022) 30 tablet  0    EPINEPHrine (EPIPEN 2-ROSANNE) 0.3 MG/0.3ML Injection Solution Auto-injector Inject 0.3 mL (1 each total) into the skin daily as needed. (Patient not taking: Reported on 1/18/2022) 2 each 2    hydrocortisone 2.5 % External Cream APPLY BID 28 g 2    sodium chloride 0.9% SOLN with tocilizumab 200 MG/10ML SOLN 4 mg/kg Inject 8 mg/kg into the vein once.      Betamethasone Dipropionate Aug (DIPROLENE) 0.05 % External Ointment Apply bid 15 g 1    Glucose Blood In Vitro Strip take by Intradermal route check  glucose 3 times a day (Patient not taking: Reported on 1/18/2022)       Allergies:Allergies[1]   PHYSICAL EXAM:   /70   Pulse 76   Temp 97.8 °F (36.6 °C)   Ht 5' 3\" (1.6 m)   Wt 177 lb (80.3 kg)   SpO2 100%   BMI 31.35 kg/m²      Physical Exam  Constitutional:       Appearance: Normal appearance. She is well-developed.   HENT:      Head: Normocephalic and atraumatic.   Eyes:      Pupils: Pupils are equal, round, and reactive to light.   Neck:      Thyroid: No thyromegaly.   Cardiovascular:      Rate and Rhythm: Normal rate and regular rhythm.      Heart sounds: Normal heart sounds. No murmur heard.     No gallop.   Pulmonary:      Effort: Pulmonary effort is normal. No respiratory distress.      Breath sounds: Normal breath sounds. No wheezing or rales.   Chest:      Chest wall: No tenderness.   Abdominal:      General: There is no distension.      Palpations: There is no mass.      Tenderness: There is no abdominal tenderness. There is no guarding or rebound.   Musculoskeletal:         General: No tenderness.      Cervical back: Normal range of motion.   Skin:     General: Skin is warm.      Coloration: Skin is not pale.      Findings: No erythema or rash.   Neurological:      Mental Status: She is alert and oriented to person, place, and time.      Motor: No abnormal muscle tone.      Coordination: Coordination normal.   Psychiatric:         Mood and Affect: Mood normal.         Behavior: Behavior  normal.         Thought Content: Thought content normal.         Judgment: Judgment normal.                ASSESSMENT/PLAN:   Visit for screening mammogram  Mammo in Feb.     Rheumatoid arthritis involving both feet with positive rheumatoid factor (HCC)  Has pain pump, sees rheumatology .     Diabetes mellitus without complication (HCC)  Glucose is controlled.     Orders Placed This Encounter   Procedures    Microalb/Creat Ratio, Random Urine       Meds This Visit:  Requested Prescriptions      No prescriptions requested or ordered in this encounter       Imaging & Referrals:  [facility] 2D+3D SCREENING BILAT (CPT=77067/73864)       ID#[id number]       [1]   Allergies  Allergen Reactions    Acetaminophen HIVES     Other reaction(s): ACETAMINOPHEN    Adhesive Tape (Rosins) HIVES     Other reaction(s): TAPE, OCCLUSIVE ADHESIVE  Other reaction(s): BANDAGES, LIGHT-WEIGHT    Aspirin HIVES and UNKNOWN     Other reaction(s): ASPIRIN  Other reaction(s): ASPIRIN    Clindamycin HIVES and UNKNOWN     Other reaction(s): CLINDAMYCIN    Codeine HIVES     Other reaction(s): CODEINE  Other reaction(s): CODEINE PHOSPHATE    Erythromycin HIVES     Other reaction(s): ERYTHROMYCIN BASE  Other reaction(s): ERYTHROMYCIN BASE    Lansoprazole HIVES     Other reaction(s): LANSOPRAZOLE  Other reaction(s): LANSOPRAZOLE    Latex HIVES     Other reaction(s): LATEX  Other reaction(s): Unknown    Levofloxacin HIVES     Other reaction(s): LEVOFLOXACIN  Other reaction(s): LEVOFLOXACIN    Morphine HIVES     Other reaction(s): MORPHINE    Penicillins HIVES     Other reaction(s): PENICILLINS  Other reaction(s): PENICILLINS    Tramadol Hcl HIVES     Other reaction(s): TRAMADOL HCL    Tuna Oil [Fish Oil] ANAPHYLAXIS    Caffeine      Other reaction(s): CAFFEINE    Fish ITCHING     Other reaction(s): FISH CONTAINING PRODUCTS    Nickel RASH

## 2025-01-13 NOTE — TELEPHONE ENCOUNTER
Please review; protocol failed/ has no protocol    Requested Prescriptions   Pending Prescriptions Disp Refills    LISINOPRIL 20 MG Oral Tab [Pharmacy Med Name: LISINOPRIL 20 MG TABLET] 90 tablet 3     Sig: TAKE 1 TABLET BY MOUTH EVERY DAY       Hypertension Medications Protocol Failed - 1/13/2025 11:32 AM        Failed - CMP or BMP in past 12 months        Failed - EGFRCR or GFRAA > 50     GFR Evaluation            Passed - Last BP reading less than 140/90     BP Readings from Last 1 Encounters:   12/17/24 112/70               Passed - In person appointment or virtual visit in the past 12 mos or appointment in next 3 mos     Recent Outpatient Visits              3 weeks ago Essential hypertension, benign    [facility], [facility] Patrick Anderson MD    Office Visit    4 months ago Essential hypertension, [facility], [facility] Patrick Anderson MD    Office Visit    7 months ago Essential hypertension, [facility], [facility] Patrick Anderson MD    Office Visit    9 months ago Essential hypertension, [facility], [facility] Patrick Anderson MD    Office Visit    1 year ago Essential hypertension, [facility], [facility] Patrick Anderson MD    Office Visit          Future Appointments         Provider Department Appt Notes    In 2 months Patrick Anderson MD [facility] 3 month f/u    In 5 months Patrick Anderson MD [facility] 3 month f/u                    Passed - Medication is active on med list          HYDROXYCHLOROQUINE 200 MG Oral Tab [Pharmacy Med Name: HYDROXYCHLOROQUINE 200 MG TAB] 180 tablet 3     Sig: TAKE 1 TABLET BY MOUTH TWICE A DAY       There is no refill protocol information for this order       ATORVASTATIN 20 MG Oral Tab [Pharmacy Med Name:  ATORVASTATIN 20 MG TABLET] 90 tablet 3     Sig: TAKE 1 TABLET BY MOUTH EVERY DAY       Cholesterol Medication Protocol Failed - 1/13/2025 11:32 AM        Failed - ALT < 80     Lab Results   Component Value Date    ALT 21 12/19/2023             Failed - ALT resulted within past year        Failed - Lipid panel within past 12 months     Lab Results   Component Value Date    CHOLEST 143 08/02/2022    TRIG 108 08/02/2022    HDL 71 (H) 08/02/2022    LDL 53 08/02/2022    VLDL 16 08/02/2022    NONHDLC 72 08/02/2022             Passed - In person appointment or virtual visit in the past 12 mos or appointment in next 3 mos     Recent Outpatient Visits              3 weeks ago Essential hypertension, benign    [facility], [facility] Patrick Anderson MD    Office Visit    4 months ago Essential hypertension, benign    [facility] Patrick Anderson MD    Office Visit    7 months ago Essential hypertension, [facility] Patrick Anderson MD    Office Visit    9 months ago Essential hypertension, [facility] Patrick Anderson MD    Office Visit    1 year ago Essential hypertension, [facility] Patrick Anderson MD    Office Visit          Future Appointments         Provider Department Appt Notes    In 2 months Patrick Anderson MD [facility] 3 month f/u    In 5 months Patrick Anderson MD [facility] 3 month f/u                    Passed - Medication is active on med list          HYDROCHLOROTHIAZIDE 12.5 MG Oral Cap [Pharmacy Med Name: HYDROCHLOROTHIAZIDE 12.5 MG CP] 90 capsule 3     Sig: TAKE 1 CAPSULE BY MOUTH EVERY DAY       Hypertension Medications Protocol Failed - 1/13/2025 11:32 AM        Failed - CMP or BMP in past 12 months        Failed  - EGFRCR or GFRAA > 50     GFR Evaluation            Passed - Last BP reading less than 140/90     BP Readings from Last 1 Encounters:   12/17/24 112/70               Passed - In person appointment or virtual visit in the past 12 mos or appointment in next 3 mos     Recent Outpatient Visits              3 weeks ago Essential hypertension, benign    [facility], [facility] Patrick Anderson MD    Office Visit    4 months ago Essential hypertension, benign    [facility], [facility] Patrick Anderson MD    Office Visit    7 months ago Essential hypertension, benign    [facility], [facility] Patrick Anderson MD    Office Visit    9 months ago Essential hypertension, benign    [facility], [facility] Patrick Anderson MD    Office Visit    1 year ago Essential hypertension, [facility], [facility] Patrick Anderson MD    Office Visit          Future Appointments         Provider Department Appt Notes    In 2 months Patrick Anderson MD [facility] 3 month f/u    In 5 months Patrick Anderson MD [facility] 3 month f/u                    Passed - Medication is active on med list          AMLODIPINE 5 MG Oral Tab [Pharmacy Med Name: AMLODIPINE BESYLATE 5 MG TAB] 90 tablet 3     Sig: Take 1 tablet (5 mg total) by mouth daily.       Hypertension Medications Protocol Failed - 1/13/2025 11:32 AM        Failed - CMP or BMP in past 12 months        Failed - EGFRCR or GFRAA > 50     GFR Evaluation            Passed - Last BP reading less than 140/90     BP Readings from Last 1 Encounters:   12/17/24 112/70               Passed - In person appointment or virtual visit in the past 12 mos or appointment in next 3 mos     Recent Outpatient Visits              3 weeks ago Essential hypertension, benign    [facility], [facility] Patrick Anderson MD    Office Visit    4 months ago Essential hypertension, benign    [facility], [facility] Patrick Anderson MD    Office Visit    7 months ago Essential hypertension, benign    [facility] Patrick Anderson MD    Office Visit    9 months ago Essential hypertension, benign    [facility] Patrick Anderson MD    Office Visit    1 year ago Essential hypertension, benign    [facility], [facility] Patrick Anderson MD    Office Visit          Future Appointments         Provider Department Appt Notes    In 2 months Patrick Anderson MD [facility] 3 month f/u    In 5 months Patrick Anderson MD [facility] 3 month f/u                    Passed - Medication is active on med list          ERGOCALCIFEROL 1.25 MG (66179 UT) Oral Cap [Pharmacy Med Name: VITAMIN D2 1.25MG(50,000 UNIT)] 12 capsule 3     Sig: TAKE 1 CAPSULE BY MOUTH ONE TIME PER WEEK       There is no refill protocol information for this order       FOLIC ACID 1 MG Oral Tab [Pharmacy Med Name: FOLIC ACID 1 MG TABLET] 90 tablet 3     Sig: TAKE 1 TABLET BY MOUTH EVERY DAY       There is no refill protocol information for this order        Recent Outpatient Visits              3 weeks ago Essential hypertension, benign    [facility], [facility] Patrick Anderson MD    Office Visit    4 months ago Essential hypertension, benign    [facility], [facility] Patrick Anderson MD    Office Visit    7 months ago Essential hypertension, benign    [facility] Patrick Anderson MD    Office Visit    9 months ago Essential hypertension, [facility] Patrick Anderson MD     Office Visit    1 year ago Essential hypertension, benign    [facility], [facility] Patrick Anderson MD    Office Visit          Future Appointments         Provider Department Appt Notes    In 2 months Patrick Anderson MD [facility], [facility] 3 month f/u    In 5 months Patrick Anderson MD [facility], [facility] 3 month f/u

## 2025-01-14 NOTE — TELEPHONE ENCOUNTER
Dr Gabriel (podmate for Dr Anderson) --- patient is OUT of these medications.       Please advise       Patient called office. Date of birth and full name both confirmed.   She is OUT of these medications, and has been waiting for them since requesting them on 1/7/25.     Dr Anderson is out of the office.

## 2025-01-16 RX ORDER — ATORVASTATIN CALCIUM 20 MG/1
20 TABLET, FILM COATED ORAL DAILY
Qty: 90 TABLET | Refills: 3 | Status: SHIPPED | OUTPATIENT
Start: 2025-01-16

## 2025-01-16 RX ORDER — HYDROXYCHLOROQUINE SULFATE 200 MG/1
TABLET, FILM COATED ORAL
Qty: 180 TABLET | Refills: 3 | Status: SHIPPED | OUTPATIENT
Start: 2025-01-16

## 2025-01-16 RX ORDER — HYDROCHLOROTHIAZIDE 12.5 MG/1
12.5 CAPSULE ORAL DAILY
Qty: 90 CAPSULE | Refills: 3 | Status: SHIPPED | OUTPATIENT
Start: 2025-01-16

## 2025-01-16 RX ORDER — ERGOCALCIFEROL 1.25 MG/1
50000 CAPSULE, LIQUID FILLED ORAL WEEKLY
Qty: 12 CAPSULE | Refills: 3 | Status: SHIPPED | OUTPATIENT
Start: 2025-01-16

## 2025-01-16 RX ORDER — FOLIC ACID 1 MG/1
1 TABLET ORAL DAILY
Qty: 90 TABLET | Refills: 3 | Status: SHIPPED | OUTPATIENT
Start: 2025-01-16

## 2025-01-16 RX ORDER — LISINOPRIL 20 MG/1
20 TABLET ORAL DAILY
Qty: 90 TABLET | Refills: 3 | Status: SHIPPED | OUTPATIENT
Start: 2025-01-16

## 2025-01-16 RX ORDER — AMLODIPINE BESYLATE 5 MG/1
5 TABLET ORAL DAILY
Qty: 90 TABLET | Refills: 3 | Status: SHIPPED | OUTPATIENT
Start: 2025-01-16

## 2025-01-16 NOTE — TELEPHONE ENCOUNTER
Patient calling back for refills, has been out of meds for 4 days.  Dr Anderson, please see pended list.

## 2025-01-27 ENCOUNTER — TELEPHONE (OUTPATIENT)
Dept: INTERNAL MEDICINE CLINIC | Facility: CLINIC | Age: 65
End: 2025-01-27

## 2025-01-27 NOTE — TELEPHONE ENCOUNTER
Spoke to patient, full name and date of birth verified.  Patient asking for her mammogram order to be mailed to her.   RN confirmed home address is correct in chart.     Patient does not have her mammogram at [facility].     RN mailed mammogram order to patient via 1st class mail today.

## 2025-01-28 ENCOUNTER — MED REC SCAN ONLY (OUTPATIENT)
Dept: INTERNAL MEDICINE CLINIC | Facility: CLINIC | Age: 65
End: 2025-01-28

## 2025-04-14 ENCOUNTER — TELEPHONE (OUTPATIENT)
Dept: INTERNAL MEDICINE CLINIC | Facility: CLINIC | Age: 65
End: 2025-04-14

## 2025-04-14 NOTE — TELEPHONE ENCOUNTER
Patient would like a letter to return to work for 4/15/25. Patient said she was admitted to [facility] on 4/6/25 and discharged on 4/8/825. Patient would like return to work letter in my mychart. Please advise    few yrs ago (2008)

## 2025-04-14 NOTE — TELEPHONE ENCOUNTER
That note would have to come from the treating physicians who actually attended to her during that admission , or she would need to be seen here . I cannot write a note without having seen her .

## 2025-04-15 ENCOUNTER — OFFICE VISIT (OUTPATIENT)
Dept: INTERNAL MEDICINE CLINIC | Facility: CLINIC | Age: 65
End: 2025-04-15

## 2025-04-15 VITALS
HEART RATE: 93 BPM | HEIGHT: 63 IN | SYSTOLIC BLOOD PRESSURE: 98 MMHG | TEMPERATURE: 99 F | OXYGEN SATURATION: 99 % | DIASTOLIC BLOOD PRESSURE: 60 MMHG | BODY MASS INDEX: 29.23 KG/M2 | WEIGHT: 165 LBS

## 2025-04-15 DIAGNOSIS — R53.83 OTHER FATIGUE: ICD-10-CM

## 2025-04-15 DIAGNOSIS — I10 ESSENTIAL HYPERTENSION, BENIGN: Primary | ICD-10-CM

## 2025-04-15 PROCEDURE — 3074F SYST BP LT 130 MM HG: CPT | Performed by: INTERNAL MEDICINE

## 2025-04-15 PROCEDURE — 3078F DIAST BP <80 MM HG: CPT | Performed by: INTERNAL MEDICINE

## 2025-04-15 PROCEDURE — 99214 OFFICE O/P EST MOD 30 MIN: CPT | Performed by: INTERNAL MEDICINE

## 2025-04-15 PROCEDURE — 3008F BODY MASS INDEX DOCD: CPT | Performed by: INTERNAL MEDICINE

## 2025-04-15 NOTE — PROGRESS NOTES
HPI:    Patient ID: Yadira Basilio is a 64 year old female.    HPIpatient was in the hospital for pump failure of the morphine pump she was lethargic weak confused.   It has been regulated and she will need to replace it soon .   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   Fatigue has improved.   She needs a note to return to work .   Review of Systems   Constitutional:  Positive for fatigue. Negative for activity change, appetite change, chills and fever.   HENT:  Negative for ear pain, hearing loss, nosebleeds, postnasal drip, rhinorrhea, sneezing and tinnitus.    Eyes:  Negative for pain, discharge, redness, itching and visual disturbance.   Respiratory:  Negative for apnea, cough, chest tightness, shortness of breath and wheezing.    Cardiovascular:  Negative for chest pain, palpitations and leg swelling.   Gastrointestinal:  Negative for abdominal distention, blood in stool, constipation, diarrhea and nausea.   Endocrine: Negative for cold intolerance and heat intolerance.   Genitourinary:  Negative for difficulty urinating, dysuria, flank pain, frequency, genital sores, hematuria and urgency.   Musculoskeletal:  Negative for neck pain and neck stiffness.   Allergic/Immunologic: Negative for immunocompromised state.   Neurological:  Negative for dizziness, syncope, facial asymmetry, weakness, light-headedness, numbness and headaches.   Psychiatric/Behavioral: Negative.            Current Medications[1]  Allergies:Allergies[2]   PHYSICAL EXAM:   BP 98/60   Pulse 93   Temp 98.8 °F (37.1 °C)   Ht 5' 3\" (1.6 m)   Wt 165 lb (74.8 kg)   SpO2 99%   BMI 29.23 kg/m²      Physical Exam  Constitutional:       Appearance: Normal appearance. She is well-developed.   HENT:      Head: Normocephalic and atraumatic.   Eyes:      Pupils: Pupils are equal, round, and reactive to light.   Neck:      Thyroid: No thyromegaly.   Cardiovascular:      Rate and  Rhythm: Normal rate and regular rhythm.      Heart sounds: Normal heart sounds. No murmur heard.     No gallop.   Pulmonary:      Effort: Pulmonary effort is normal. No respiratory distress.      Breath sounds: Normal breath sounds. No wheezing or rales.   Chest:      Chest wall: No tenderness.   Abdominal:      General: There is no distension.      Palpations: There is no mass.      Tenderness: There is no abdominal tenderness. There is no guarding or rebound.   Musculoskeletal:         General: No tenderness.      Cervical back: Normal range of motion.   Skin:     General: Skin is warm.      Coloration: Skin is not pale.      Findings: No erythema or rash.   Neurological:      Mental Status: She is alert and oriented to person, place, and time.      Motor: No abnormal muscle tone.      Coordination: Coordination normal.   Psychiatric:         Mood and Affect: Mood normal.         Behavior: Behavior normal.         Thought Content: Thought content normal.         Judgment: Judgment normal.                ASSESSMENT/PLAN:   Other fatigue  Resolving , fatigue post hospitalization for morphine pump failure.      Essential hypertension, benign  Bp is controlled, it was in the hosp    No orders of the defined types were placed in this encounter.      Meds This Visit:  Requested Prescriptions      No prescriptions requested or ordered in this encounter       Imaging & Referrals:  None       ID#[id number]       [1]   Current Outpatient Medications   Medication Sig Dispense Refill    Ziconotide Acetate (PRIALT IT) by Intrathecal route.      lisinopril 20 MG Oral Tab Take 1 tablet (20 mg total) by mouth daily. 90 tablet 3    hydroxychloroquine 200 MG Oral Tab TAKE 1 TABLET BY MOUTH TWICE A  tablet 3    atorvastatin 20 MG Oral Tab Take 1 tablet (20 mg total) by mouth daily. 90 tablet 3    hydroCHLOROthiazide 12.5 MG Oral Cap Take 1 capsule (12.5 mg total) by mouth daily. 90 capsule 3    amLODIPine 5 MG Oral Tab Take 1  tablet (5 mg total) by mouth daily. 90 tablet 3    ergocalciferol 1.25 MG (00243 UT) Oral Cap Take 1 capsule (50,000 Units total) by mouth once a week. 12 capsule 3    folic acid 1 MG Oral Tab Take 1 tablet (1 mg total) by mouth daily. 90 tablet 3    carisoprodol 350 MG Oral Tab TAKE 1 TABLET (350 MG TOTAL) BY MOUTH TWICE A DAY AS NEEDED FOR MUSCLE SPASM 60 tablet 3    methotrexate 2.5 MG Oral Tab Take 8 tablets (20 mg total) by mouth once a week. 96 tablet 3    metFORMIN  MG Oral Tablet 24 Hr Take 1 tablet (500 mg total) by mouth 2 (two) times daily with meals. 180 tablet 3    Fluocinolone Acetonide 0.025 % External Ointment Apply topically 2 (two) times daily as needed.      XOLAIR 150 MG/ML Subcutaneous Solution Prefilled Syringe       clotrimazole-betamethasone 1-0.05 % External Cream Apply bid 15 g 3    gabapentin 300 MG Oral Cap Take 1 capsule (300 mg total) by mouth 3 (three) times daily.      Econazole Nitrate 1 % External Cream Apply daily 15 g 3    METOPROLOL SUCCINATE 25 MG Oral Tablet 24 Hr TAKE 1 TABLET BY MOUTH EVERY DAY 90 tablet 1    meclizine 25 MG Oral Tab Take 1 tablet (25 mg total) by mouth 3 (three) times daily as needed for Dizziness. 30 tablet 0    fexofenadine 180 MG Oral Tab Take 1 tablet (180 mg total) by mouth daily as needed. 90 tablet 3    Cetirizine HCl (ZYRTEC ALLERGY) 10 MG Oral Cap Take 10 mg by mouth daily as needed.        Betamethasone Dipropionate Aug (DIPROLENE) 0.05 % External Ointment Apply bid 15 g 1    DiphenhydrAMINE HCl 25 MG Oral Cap Take 1 capsule (25 mg total) by mouth every 3 (three) hours as needed.      triamcinolone 0.1 % External Ointment Apply bid 450 g 3    triamcinolone 0.1 % External Ointment APPLY TOPICALLY TWO TIMES DAILY. USE AS DIRECTED. APPLICATION SITE: TO AFFECTED AREAS      Glucose Blood (ONETOUCH VERIO) In Vitro Strip Check blood sugar 3 times a week 50 strip 3    Lancets Does not apply Misc Check blood sugar 3 times a week (Patient not taking:  Reported on 1/18/2022) 50 each 3    EPINEPHrine (EPIPEN 2-ROSANNE) 0.3 MG/0.3ML Injection Solution Auto-injector Inject 0.3 mL (1 each total) into the skin daily as needed. (Patient not taking: Reported on 1/18/2022) 2 each 2    hydrocortisone 2.5 % External Cream APPLY BID 28 g 2    sodium chloride 0.9% SOLN with tocilizumab 200 MG/10ML SOLN 4 mg/kg Inject 8 mg/kg into the vein once.      Glucose Blood In Vitro Strip take by Intradermal route check  glucose 3 times a day (Patient not taking: Reported on 1/18/2022)     [2]   Allergies  Allergen Reactions    Acetaminophen HIVES     Other reaction(s): ACETAMINOPHEN    Adhesive Tape (Rosins) HIVES     Other reaction(s): TAPE, OCCLUSIVE ADHESIVE  Other reaction(s): BANDAGES, LIGHT-WEIGHT    Aspirin HIVES and UNKNOWN     Other reaction(s): ASPIRIN  Other reaction(s): ASPIRIN    Clindamycin HIVES and UNKNOWN     Other reaction(s): CLINDAMYCIN    Codeine HIVES     Other reaction(s): CODEINE  Other reaction(s): CODEINE PHOSPHATE    Erythromycin HIVES     Other reaction(s): ERYTHROMYCIN BASE  Other reaction(s): ERYTHROMYCIN BASE    Lansoprazole HIVES     Other reaction(s): LANSOPRAZOLE  Other reaction(s): LANSOPRAZOLE    Latex HIVES     Other reaction(s): LATEX  Other reaction(s): Unknown    Levofloxacin HIVES     Other reaction(s): LEVOFLOXACIN  Other reaction(s): LEVOFLOXACIN    Morphine HIVES     Other reaction(s): MORPHINE    Penicillins HIVES     Other reaction(s): PENICILLINS  Other reaction(s): PENICILLINS    Tramadol Hcl HIVES     Other reaction(s): TRAMADOL HCL    Tuna Oil [Fish Oil] ANAPHYLAXIS    Caffeine      Other reaction(s): CAFFEINE    Fish ITCHING     Other reaction(s): FISH CONTAINING PRODUCTS    Nickel RASH      Private car

## 2025-05-01 ENCOUNTER — MED REC SCAN ONLY (OUTPATIENT)
Dept: INTERNAL MEDICINE CLINIC | Facility: CLINIC | Age: 65
End: 2025-05-01

## 2025-05-05 DIAGNOSIS — M05.771 RHEUMATOID ARTHRITIS INVOLVING BOTH FEET WITH POSITIVE RHEUMATOID FACTOR (HCC): ICD-10-CM

## 2025-05-05 DIAGNOSIS — M05.772 RHEUMATOID ARTHRITIS INVOLVING BOTH FEET WITH POSITIVE RHEUMATOID FACTOR (HCC): ICD-10-CM

## 2025-05-06 RX ORDER — CARISOPRODOL 350 MG/1
TABLET ORAL
Qty: 60 TABLET | Refills: 1 | Status: SHIPPED | OUTPATIENT
Start: 2025-05-06

## 2025-05-06 NOTE — TELEPHONE ENCOUNTER
Please review; protocol failed/ has no protocol     Recent fills: 02/21/2025,12/17/2024  Last Rx written: 10/07/2024  Last Office Visit: 04/15/2025    Recent Visits  Date Type Provider Dept   04/15/25 Office Visit Patrick Anderson MD [facility]     Future Appointments  Date Type Provider Dept   06/27/25 Appointment Patrick Anderson MD [facility]   Showing future appointments within next 150 days with a meds authorizing provider and meeting all other requirements

## 2025-06-27 ENCOUNTER — OFFICE VISIT (OUTPATIENT)
Dept: INTERNAL MEDICINE CLINIC | Facility: CLINIC | Age: 65
End: 2025-06-27

## 2025-06-27 VITALS
HEIGHT: 63 IN | TEMPERATURE: 98 F | BODY MASS INDEX: 33.66 KG/M2 | SYSTOLIC BLOOD PRESSURE: 126 MMHG | WEIGHT: 190 LBS | OXYGEN SATURATION: 99 % | HEART RATE: 100 BPM | DIASTOLIC BLOOD PRESSURE: 82 MMHG

## 2025-06-27 DIAGNOSIS — E11.9 DIABETES MELLITUS WITHOUT COMPLICATION (HCC): ICD-10-CM

## 2025-06-27 DIAGNOSIS — Z12.11 COLON CANCER SCREENING: ICD-10-CM

## 2025-06-27 DIAGNOSIS — I10 ESSENTIAL HYPERTENSION, BENIGN: Primary | ICD-10-CM

## 2025-06-27 PROCEDURE — 99214 OFFICE O/P EST MOD 30 MIN: CPT | Performed by: INTERNAL MEDICINE

## 2025-06-27 PROCEDURE — 3079F DIAST BP 80-89 MM HG: CPT | Performed by: INTERNAL MEDICINE

## 2025-06-27 PROCEDURE — 3074F SYST BP LT 130 MM HG: CPT | Performed by: INTERNAL MEDICINE

## 2025-06-27 PROCEDURE — 3008F BODY MASS INDEX DOCD: CPT | Performed by: INTERNAL MEDICINE

## 2025-06-27 NOTE — PROGRESS NOTES
HPI:    Patient ID: Yadira Basilio is a 65 year old female.    HPIdoing well, no chest pain , no shortness of breath , no swelling. No changes in bowel movements.  No visual changes. No headaches.     Review of Systems   Constitutional:  Negative for activity change, appetite change, chills, fatigue and fever.   HENT:  Negative for ear pain, hearing loss, nosebleeds, postnasal drip, rhinorrhea, sneezing and tinnitus.    Eyes:  Negative for pain, discharge, redness, itching and visual disturbance.   Respiratory:  Negative for apnea, cough, chest tightness, shortness of breath and wheezing.    Cardiovascular:  Negative for chest pain, palpitations and leg swelling.   Gastrointestinal:  Negative for abdominal distention, blood in stool, constipation, diarrhea and nausea.   Endocrine: Negative for cold intolerance and heat intolerance.   Genitourinary:  Negative for difficulty urinating, dysuria, flank pain, frequency, genital sores, hematuria and urgency.   Musculoskeletal:  Negative for neck pain and neck stiffness.   Allergic/Immunologic: Negative for immunocompromised state.   Neurological:  Negative for dizziness, syncope, facial asymmetry, weakness, light-headedness, numbness and headaches.          Current Medications[1]  Allergies:Allergies[2]   PHYSICAL EXAM:   /82   Pulse 100   Temp 98.2 °F (36.8 °C)   Ht 5' 3\" (1.6 m)   Wt 190 lb (86.2 kg)   SpO2 99%   BMI 33.66 kg/m²      Physical Exam  Constitutional:       Appearance: She is well-developed.   Eyes:      Pupils: Pupils are equal, round, and reactive to light.   Neck:      Thyroid: No thyromegaly.   Cardiovascular:      Rate and Rhythm: Normal rate and regular rhythm.      Heart sounds: Normal heart sounds. No murmur heard.     No gallop.   Pulmonary:      Effort: Pulmonary effort is normal. No respiratory distress.      Breath sounds: Normal breath sounds. No wheezing or rales.   Chest:      Chest wall: No tenderness.   Abdominal:      General:  There is no distension.      Palpations: There is no mass.      Tenderness: There is no abdominal tenderness. There is no guarding or rebound.   Musculoskeletal:         General: No tenderness.      Cervical back: Normal range of motion.   Skin:     General: Skin is warm.      Coloration: Skin is not pale.      Findings: No erythema or rash.   Neurological:      Mental Status: She is alert and oriented to person, place, and time.      Motor: No abnormal muscle tone.      Coordination: Coordination normal.   Psychiatric:         Behavior: Behavior normal.         Thought Content: Thought content normal.         Judgment: Judgment normal.                ASSESSMENT/PLAN:   Essential hypertension, benign  Bp is controlled.     Diabetes mellitus without complication (HCC)  Controlled on current meds.     No orders of the defined types were placed in this encounter.      Meds This Visit:  Requested Prescriptions      No prescriptions requested or ordered in this encounter       Imaging & Referrals:  None       ID#[id number]       [1]   Current Outpatient Medications   Medication Sig Dispense Refill    carisoprodol 350 MG Oral Tab TAKE 1 TABLET (350 MG TOTAL) BY MOUTH TWICE A DAY AS NEEDED FOR MUSCLE SPASM 60 tablet 1    Ziconotide Acetate (PRIALT IT) by Intrathecal route.      lisinopril 20 MG Oral Tab Take 1 tablet (20 mg total) by mouth daily. 90 tablet 3    hydroxychloroquine 200 MG Oral Tab TAKE 1 TABLET BY MOUTH TWICE A  tablet 3    atorvastatin 20 MG Oral Tab Take 1 tablet (20 mg total) by mouth daily. 90 tablet 3    hydroCHLOROthiazide 12.5 MG Oral Cap Take 1 capsule (12.5 mg total) by mouth daily. 90 capsule 3    amLODIPine 5 MG Oral Tab Take 1 tablet (5 mg total) by mouth daily. 90 tablet 3    ergocalciferol 1.25 MG (97924 UT) Oral Cap Take 1 capsule (50,000 Units total) by mouth once a week. 12 capsule 3    folic acid 1 MG Oral Tab Take 1 tablet (1 mg total) by mouth daily. 90 tablet 3    methotrexate 2.5 MG  Oral Tab Take 8 tablets (20 mg total) by mouth once a week. 96 tablet 3    metFORMIN  MG Oral Tablet 24 Hr Take 1 tablet (500 mg total) by mouth 2 (two) times daily with meals. 180 tablet 3    Fluocinolone Acetonide 0.025 % External Ointment Apply topically 2 (two) times daily as needed.      XOLAIR 150 MG/ML Subcutaneous Solution Prefilled Syringe       clotrimazole-betamethasone 1-0.05 % External Cream Apply bid 15 g 3    triamcinolone 0.1 % External Ointment Apply bid 450 g 3    gabapentin 300 MG Oral Cap Take 1 capsule (300 mg total) by mouth 3 (three) times daily.      Econazole Nitrate 1 % External Cream Apply daily 15 g 3    triamcinolone 0.1 % External Ointment APPLY TOPICALLY TWO TIMES DAILY. USE AS DIRECTED. APPLICATION SITE: TO AFFECTED AREAS      METOPROLOL SUCCINATE 25 MG Oral Tablet 24 Hr TAKE 1 TABLET BY MOUTH EVERY DAY 90 tablet 1    Glucose Blood (ONETOUCH VERIO) In Vitro Strip Check blood sugar 3 times a week 50 strip 3    Lancets Does not apply Misc Check blood sugar 3 times a week (Patient not taking: Reported on 1/18/2022) 50 each 3    meclizine 25 MG Oral Tab Take 1 tablet (25 mg total) by mouth 3 (three) times daily as needed for Dizziness. 30 tablet 0    fexofenadine 180 MG Oral Tab Take 1 tablet (180 mg total) by mouth daily as needed. 90 tablet 3    EPINEPHrine (EPIPEN 2-ROSANNE) 0.3 MG/0.3ML Injection Solution Auto-injector Inject 0.3 mL (1 each total) into the skin daily as needed. (Patient not taking: Reported on 1/18/2022) 2 each 2    hydrocortisone 2.5 % External Cream APPLY BID 28 g 2    Cetirizine HCl (ZYRTEC ALLERGY) 10 MG Oral Cap Take 10 mg by mouth daily as needed.        sodium chloride 0.9% SOLN with tocilizumab 200 MG/10ML SOLN 4 mg/kg Inject 8 mg/kg into the vein once.      Betamethasone Dipropionate Aug (DIPROLENE) 0.05 % External Ointment Apply bid 15 g 1    DiphenhydrAMINE HCl 25 MG Oral Cap Take 1 capsule (25 mg total) by mouth every 3 (three) hours as needed.       Glucose Blood In Vitro Strip take by Intradermal route check  glucose 3 times a day (Patient not taking: Reported on 1/18/2022)     [2]   Allergies  Allergen Reactions    Acetaminophen HIVES     Other reaction(s): ACETAMINOPHEN    Adhesive Tape (Rosins) HIVES     Other reaction(s): TAPE, OCCLUSIVE ADHESIVE  Other reaction(s): BANDAGES, LIGHT-WEIGHT    Aspirin HIVES and UNKNOWN     Other reaction(s): ASPIRIN  Other reaction(s): ASPIRIN    Clindamycin HIVES and UNKNOWN     Other reaction(s): CLINDAMYCIN    Codeine HIVES     Other reaction(s): CODEINE  Other reaction(s): CODEINE PHOSPHATE    Erythromycin HIVES     Other reaction(s): ERYTHROMYCIN BASE  Other reaction(s): ERYTHROMYCIN BASE    Lansoprazole HIVES     Other reaction(s): LANSOPRAZOLE  Other reaction(s): LANSOPRAZOLE    Latex HIVES     Other reaction(s): LATEX  Other reaction(s): Unknown    Levofloxacin HIVES     Other reaction(s): LEVOFLOXACIN  Other reaction(s): LEVOFLOXACIN    Morphine HIVES     Other reaction(s): MORPHINE    Penicillins HIVES     Other reaction(s): PENICILLINS  Other reaction(s): PENICILLINS    Tramadol Hcl HIVES     Other reaction(s): TRAMADOL HCL    Tuna Oil [Fish Oil] ANAPHYLAXIS    Caffeine      Other reaction(s): CAFFEINE    Fish ITCHING     Other reaction(s): FISH CONTAINING PRODUCTS    Nickel RASH

## 2025-07-14 NOTE — TELEPHONE ENCOUNTER
Medications - Current[1]  metFORMIN  MG Oral Tablet 24 Hr, Take 1 tablet (500 mg total) by mouth 2 (two) times daily with meals., Disp: 180 tablet, Rfl: 3        [1]   Current Outpatient Medications:     carisoprodol 350 MG Oral Tab, TAKE 1 TABLET (350 MG TOTAL) BY MOUTH TWICE A DAY AS NEEDED FOR MUSCLE SPASM, Disp: 60 tablet, Rfl: 1    Ziconotide Acetate (PRIALT IT), by Intrathecal route., Disp: , Rfl:     lisinopril 20 MG Oral Tab, Take 1 tablet (20 mg total) by mouth daily., Disp: 90 tablet, Rfl: 3    hydroxychloroquine 200 MG Oral Tab, TAKE 1 TABLET BY MOUTH TWICE A DAY, Disp: 180 tablet, Rfl: 3    atorvastatin 20 MG Oral Tab, Take 1 tablet (20 mg total) by mouth daily., Disp: 90 tablet, Rfl: 3    hydroCHLOROthiazide 12.5 MG Oral Cap, Take 1 capsule (12.5 mg total) by mouth daily., Disp: 90 capsule, Rfl: 3    amLODIPine 5 MG Oral Tab, Take 1 tablet (5 mg total) by mouth daily., Disp: 90 tablet, Rfl: 3    ergocalciferol 1.25 MG (75787 UT) Oral Cap, Take 1 capsule (50,000 Units total) by mouth once a week., Disp: 12 capsule, Rfl: 3    folic acid 1 MG Oral Tab, Take 1 tablet (1 mg total) by mouth daily., Disp: 90 tablet, Rfl: 3    methotrexate 2.5 MG Oral Tab, Take 8 tablets (20 mg total) by mouth once a week., Disp: 96 tablet, Rfl: 3    metFORMIN  MG Oral Tablet 24 Hr, Take 1 tablet (500 mg total) by mouth 2 (two) times daily with meals., Disp: 180 tablet, Rfl: 3    Fluocinolone Acetonide 0.025 % External Ointment, Apply topically 2 (two) times daily as needed., Disp: , Rfl:     XOLAIR 150 MG/ML Subcutaneous Solution Prefilled Syringe, , Disp: , Rfl:     clotrimazole-betamethasone 1-0.05 % External Cream, Apply bid, Disp: 15 g, Rfl: 3    triamcinolone 0.1 % External Ointment, Apply bid, Disp: 450 g, Rfl: 3    gabapentin 300 MG Oral Cap, Take 1 capsule (300 mg total) by mouth 3 (three) times daily., Disp: , Rfl:     Econazole Nitrate 1 % External Cream, Apply daily, Disp: 15 g, Rfl: 3    triamcinolone  0.1 % External Ointment, APPLY TOPICALLY TWO TIMES DAILY. USE AS DIRECTED. APPLICATION SITE: TO AFFECTED AREAS, Disp: , Rfl:     METOPROLOL SUCCINATE 25 MG Oral Tablet 24 Hr, TAKE 1 TABLET BY MOUTH EVERY DAY, Disp: 90 tablet, Rfl: 1    Glucose Blood (ONETOUCH VERIO) In Vitro Strip, Check blood sugar 3 times a week, Disp: 50 strip, Rfl: 3    Lancets Does not apply Misc, Check blood sugar 3 times a week (Patient not taking: Reported on 1/18/2022), Disp: 50 each, Rfl: 3    meclizine 25 MG Oral Tab, Take 1 tablet (25 mg total) by mouth 3 (three) times daily as needed for Dizziness., Disp: 30 tablet, Rfl: 0    fexofenadine 180 MG Oral Tab, Take 1 tablet (180 mg total) by mouth daily as needed., Disp: 90 tablet, Rfl: 3    EPINEPHrine (EPIPEN 2-ROSNANE) 0.3 MG/0.3ML Injection Solution Auto-injector, Inject 0.3 mL (1 each total) into the skin daily as needed. (Patient not taking: Reported on 1/18/2022), Disp: 2 each, Rfl: 2    hydrocortisone 2.5 % External Cream, APPLY BID, Disp: 28 g, Rfl: 2    Cetirizine HCl (ZYRTEC ALLERGY) 10 MG Oral Cap, Take 10 mg by mouth daily as needed.  , Disp: , Rfl:     sodium chloride 0.9% SOLN with tocilizumab 200 MG/10ML SOLN 4 mg/kg, Inject 8 mg/kg into the vein once., Disp: , Rfl:     Betamethasone Dipropionate Aug (DIPROLENE) 0.05 % External Ointment, Apply bid, Disp: 15 g, Rfl: 1    DiphenhydrAMINE HCl 25 MG Oral Cap, Take 1 capsule (25 mg total) by mouth every 3 (three) hours as needed., Disp: , Rfl:     Glucose Blood In Vitro Strip, take by Intradermal route check  glucose 3 times a day (Patient not taking: Reported on 1/18/2022), Disp: , Rfl:

## 2025-07-19 RX ORDER — METFORMIN HYDROCHLORIDE 500 MG/1
500 TABLET, EXTENDED RELEASE ORAL 2 TIMES DAILY WITH MEALS
Qty: 180 TABLET | Refills: 0 | Status: SHIPPED | OUTPATIENT
Start: 2025-07-19

## 2025-07-24 ENCOUNTER — TELEPHONE (OUTPATIENT)
Dept: FAMILY MEDICINE CLINIC | Facility: CLINIC | Age: 65
End: 2025-07-24

## 2025-07-24 NOTE — TELEPHONE ENCOUNTER
Medications - Current[1]  triamcinolone 0.1 % External Ointment, Apply bid, Disp: 450 g, Rfl: 3        [1]   Current Outpatient Medications:     metFORMIN  MG Oral Tablet 24 Hr, Take 1 tablet (500 mg total) by mouth 2 (two) times daily with meals., Disp: 180 tablet, Rfl: 0    carisoprodol 350 MG Oral Tab, TAKE 1 TABLET (350 MG TOTAL) BY MOUTH TWICE A DAY AS NEEDED FOR MUSCLE SPASM, Disp: 60 tablet, Rfl: 1    Ziconotide Acetate (PRIALT IT), by Intrathecal route., Disp: , Rfl:     lisinopril 20 MG Oral Tab, Take 1 tablet (20 mg total) by mouth daily., Disp: 90 tablet, Rfl: 3    hydroxychloroquine 200 MG Oral Tab, TAKE 1 TABLET BY MOUTH TWICE A DAY, Disp: 180 tablet, Rfl: 3    atorvastatin 20 MG Oral Tab, Take 1 tablet (20 mg total) by mouth daily., Disp: 90 tablet, Rfl: 3    hydroCHLOROthiazide 12.5 MG Oral Cap, Take 1 capsule (12.5 mg total) by mouth daily., Disp: 90 capsule, Rfl: 3    amLODIPine 5 MG Oral Tab, Take 1 tablet (5 mg total) by mouth daily., Disp: 90 tablet, Rfl: 3    ergocalciferol 1.25 MG (31963 UT) Oral Cap, Take 1 capsule (50,000 Units total) by mouth once a week., Disp: 12 capsule, Rfl: 3    folic acid 1 MG Oral Tab, Take 1 tablet (1 mg total) by mouth daily., Disp: 90 tablet, Rfl: 3    methotrexate 2.5 MG Oral Tab, Take 8 tablets (20 mg total) by mouth once a week., Disp: 96 tablet, Rfl: 3    Fluocinolone Acetonide 0.025 % External Ointment, Apply topically 2 (two) times daily as needed., Disp: , Rfl:     XOLAIR 150 MG/ML Subcutaneous Solution Prefilled Syringe, , Disp: , Rfl:     clotrimazole-betamethasone 1-0.05 % External Cream, Apply bid, Disp: 15 g, Rfl: 3    triamcinolone 0.1 % External Ointment, Apply bid, Disp: 450 g, Rfl: 3    gabapentin 300 MG Oral Cap, Take 1 capsule (300 mg total) by mouth 3 (three) times daily., Disp: , Rfl:     Econazole Nitrate 1 % External Cream, Apply daily, Disp: 15 g, Rfl: 3    triamcinolone 0.1 % External Ointment, APPLY TOPICALLY TWO TIMES DAILY. USE AS  DIRECTED. APPLICATION SITE: TO AFFECTED AREAS, Disp: , Rfl:     METOPROLOL SUCCINATE 25 MG Oral Tablet 24 Hr, TAKE 1 TABLET BY MOUTH EVERY DAY, Disp: 90 tablet, Rfl: 1    Glucose Blood (ONETOUCH VERIO) In Vitro Strip, Check blood sugar 3 times a week, Disp: 50 strip, Rfl: 3    Lancets Does not apply Misc, Check blood sugar 3 times a week (Patient not taking: Reported on 1/18/2022), Disp: 50 each, Rfl: 3    meclizine 25 MG Oral Tab, Take 1 tablet (25 mg total) by mouth 3 (three) times daily as needed for Dizziness., Disp: 30 tablet, Rfl: 0    fexofenadine 180 MG Oral Tab, Take 1 tablet (180 mg total) by mouth daily as needed., Disp: 90 tablet, Rfl: 3    EPINEPHrine (EPIPEN 2-ROSANNE) 0.3 MG/0.3ML Injection Solution Auto-injector, Inject 0.3 mL (1 each total) into the skin daily as needed. (Patient not taking: Reported on 1/18/2022), Disp: 2 each, Rfl: 2    hydrocortisone 2.5 % External Cream, APPLY BID, Disp: 28 g, Rfl: 2    Cetirizine HCl (ZYRTEC ALLERGY) 10 MG Oral Cap, Take 10 mg by mouth daily as needed.  , Disp: , Rfl:     sodium chloride 0.9% SOLN with tocilizumab 200 MG/10ML SOLN 4 mg/kg, Inject 8 mg/kg into the vein once., Disp: , Rfl:     Betamethasone Dipropionate Aug (DIPROLENE) 0.05 % External Ointment, Apply bid, Disp: 15 g, Rfl: 1    DiphenhydrAMINE HCl 25 MG Oral Cap, Take 1 capsule (25 mg total) by mouth every 3 (three) hours as needed., Disp: , Rfl:     Glucose Blood In Vitro Strip, take by Intradermal route check  glucose 3 times a day (Patient not taking: Reported on 1/18/2022), Disp: , Rfl:

## 2025-07-25 RX ORDER — TRIAMCINOLONE ACETONIDE 1 MG/G
OINTMENT TOPICAL
Qty: 450 G | Refills: 0 | Status: SHIPPED | OUTPATIENT
Start: 2025-07-25

## 2025-07-25 NOTE — TELEPHONE ENCOUNTER
Please Review. Protocol Failed; No Protocol     Requested Prescriptions   Pending Prescriptions Disp Refills    triamcinolone 0.1 % External Ointment 450 g 3     Sig: Apply bid       There is no refill protocol information for this order

## (undated) DIAGNOSIS — N60.11 FIBROCYSTIC DISEASE OF RIGHT BREAST: Primary | ICD-10-CM

## (undated) DIAGNOSIS — Z47.89 ORTHOPEDIC AFTERCARE: Primary | ICD-10-CM
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
## (undated) NOTE — LETTER
3/26/9231              [address] [facility]         Dear Amie Montoya,    This letter is to inform you that our office has made several attempts to reach you by phone without success.   We were attempting to contact

## (undated) NOTE — MR AVS SNAPSHOT
[facility] [place] [address] [address]  [phone]               Thank you for choosing us for your health care visit with Noelle Montoya MD.  We are glad to serve you and happy to provide you with this summary of y PHYSICAL THERAPY EXTERNAL [887086 CPT(R)]  Order #:  [id number]         **REFERRAL REQUEST**    Your physician has referred you to a specialist.  Your physician or the clinic staff will provide you with the phone number you should call to schedule your marjorie Other reaction(s):  FISH CONTAINING PRODUCTS    Lansoprazole     Other reaction(s): LANSOPRAZOLE  Other reaction(s): LANSOPRAZOLE    Latex     Other reaction(s): LATEX    Levofloxacin     Other reaction(s): LEVOFLOXACIN  Other reaction(s): LEVOFLOXACIN Take 1 capsule (12.5 mg total) by mouth once daily. Commonly known as:  MICROZIDE           HYDROcodone-acetaminophen  MG Tabs   Take 1 tablet by mouth every 6 (six) hours as needed for Pain.    Commonly known as:  NORCO           Hydrocortisone Hannah information, go to [email or web address] and click on the Sign Up Now link in the [employer] box. Enter your How[employer] Activation Code exactly as it appears below along with your Zip Code and Date of Birth to complete the sign-up process.  If you do

## (undated) NOTE — LETTER
75/62/1132              [address] [id number]       To Whom It May Concern; My patient Fer Santana is not able to do floor activities due to mobility issues.          Sincerely,    SHAYLA Szymanski

## (undated) NOTE — LETTER
4/15/2025              Yadira Basilio        [address] [place] [facility] [address]         To Whom It May Concern;          My patient Ms. Yadira Basilio has been off of work April 6 , 2025 Through April 14, 2025 .   She may return to work on April 15, 2025 .     Sincerely,    Patrick Anderson MD          Document electronically generated by:  Patrick Anderson MD

## (undated) NOTE — LETTER
8/29/9167              [address] [id number]         To Whom It May Concern; My patient has been off of work Nov 26, 2020 through Jan. 24, 2021 .   She may return to work on Jan. 25, 2021 without r

## (undated) NOTE — MR AVS SNAPSHOT
[facility] [place] [address] [address]  [phone]               Thank you for choosing us for your health care visit with Juliet Renee MD.  We are glad to serve you and happy to provide you with this summary of y Morphine     Other reaction(s): MORPHINE    Penicillins     Other reaction(s): PENICILLINS  Other reaction(s): PENICILLINS    Tramadol Hcl     Other reaction(s): TRAMADOL HCL                Today's Vital Signs     BP Pulse Temp Height Weight BMI    110/70 Hydrocortisone Valerate 0.2 % Crea   Commonly known as:  WESTCORT           Hydroxychloroquine Sulfate 200 MG Tabs   Take 1 tablet (200 mg total) by mouth 2 (two) times daily.    Commonly known as:  PLAQUENIL           HydrOXYzine HCl 10 MG Tabs   TAKE 1 T If you have questions, you can call [phone] to talk to our [facility] Staff. Remember, Sl[employer]Payhart is NOT to be used for urgent needs. For medical emergencies, dial 911.            Visit [facility] online at  [email or web address]

## (undated) NOTE — MR AVS SNAPSHOT
[facility] [place] [address] [address]  [phone]               Thank you for choosing us for your health care visit with Fritz Flores MD.  We are glad to serve you and happy to provide you with this summary of y Other reaction(s): LANSOPRAZOLE  Other reaction(s): LANSOPRAZOLE    Latex     Other reaction(s): LATEX    Levofloxacin     Other reaction(s): LEVOFLOXACIN  Other reaction(s): LEVOFLOXACIN    Morphine     Other reaction(s): MORPHINE    Penicillins     Othe Commonly known as:  MICROZIDE           HYDROcodone-acetaminophen  MG Tabs   Take 1 tablet by mouth every 6 (six) hours as needed for Pain.    Commonly known as:  [address]   Start taking on:  6/30/2017           Hydrocortisone Valerate 0.2 % Crea   Commo information, go to [email or web address] and click on the Sign Up Now link in the [employer] box. Enter your [employer] Activation Code exactly as it appears below along with your Zip Code and Date of Birth to complete the sign-up process.  If you do

## (undated) NOTE — Clinical Note
Coronavirus Disease 2019 (COVID-19)     [facility] is committed to the safety and well-being of our patients, members, employees, and communities.  As concerns arise about the new strain of coronavirus that causes COVID-19, [facility] 4. If you have a medical appointment, call the healthcare provider ahead of time and tell them that you have or may have COVID-19.  5. For medical emergencies, call 911 and notify the dispatch personnel that you have or may have COVID-19.   6. Cover your c · At least 10 days have passed since symptoms first appeared OR if asymptomatic patient or date of symptom onset is unclear then use 10 days post COVID test date.    · At least 20 days have passed for severe illness (requiring hospitalization) OR if you are *Some people will be required to have a repeat COVID-19 test in order to be eligible to donate. If you’re instructed by Bruce Romero that a repeat test is required, please contact the [facility] COVID-19 Nurse Triage Line at [phone].     Additional Inf

## (undated) NOTE — LETTER
Date & Time: 10/9/2020, 2:22 AM  Patient: Kanika Adam  Encounter Provider(s):    MD Yossi Jimenez MD         This certifies that Belkys Olvera, a patient at an [facility] facility, am leaving the facility vo
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
## (undated) NOTE — MR AVS SNAPSHOT
[facility] [place] [address] [address]  [phone]               Thank you for choosing us for your health care visit with Norberto Lopez MD.  We are glad to serve you and happy to provide you with this summary of y Other reaction(s): LEVOFLOXACIN  Other reaction(s): LEVOFLOXACIN    Morphine     Other reaction(s): MORPHINE    Penicillins     Other reaction(s): PENICILLINS  Other reaction(s): PENICILLINS    Tramadol Hcl     Other reaction(s): TRAMADOL HCL Hydroxychloroquine Sulfate 200 MG Tabs   Take 1 tablet (200 mg total) by mouth 2 (two) times daily.    Commonly known as:  PLAQUENIL           HydrOXYzine HCl 10 MG Tabs   TAKE 1 TABLET BY MOUTH THREE TIMES DAILY AS NEEDED FOR ITCHING   Commonly known as: Monday January 30, 2017     Imaging:  RHIANNON SCREENING BILAT (LAR=[id number])    Instructions:   To schedule a test at any [facility], call Central Scheduling at [phone], Monday through Friday between 7:30am to 6pm and on Saturday Educational Information     Healthy Diet and Regular Exercise  [employer] [facility] [address] for making healthy food choices  -   Enjoy your food, but eat less. Fully enjoy your food when eating. Don’t eat while distracted and slow down.    Avoid

## (undated) NOTE — ED AVS SNAPSHOT
[facility]    [address]     [address] [address]    Phone:  [phone]    Fax:  [phone]           Odette Philip   MRN: [id number]    Department:  [facility]   Date of Visit:  3/17/2 and Class Registration line at [phone] or find a doctor online by visiting [email or web address].    IF THERE IS ANY CHANGE OR WORSENING OF YOUR CONDITION, CALL YOUR PRIMARY CARE PHYSICIAN AT ONCE OR RETURN IMMEDIATELY TO [address] [place] [address].     If

## (undated) NOTE — ED AVS SNAPSHOT
[facility]    [address]     [address] [place] [address]    Phone:  [phone]    Fax:  [phone]           Jack Frazier   MRN: [id number]    Department:  [facility] Department   Date of Visit:  3/17/2 coverage and benefits available for follow-up care and referrals. If you have difficulty scheduling your follow-up appointment as directed, please call our  at [phone].      Si tiene problemas para programar salazar migel de seguimiento s different from what your Primary Care doctor has instructed you - please continue to take your medications as instructed by your Primary Care doctor until you can check with your doctor. Please bring the medication list to your next doctor's appointment. can help with your Affordable Care Act coverage, as well as all types of Medicaid plans. To get signed up and covered, please call [phone] and ask to get set up for an insurance coverage that is in-network with Jj Fernandez

## (undated) NOTE — MR AVS SNAPSHOT
[facility] SPECIALTY [facility] [place] [address] [id number] [phone]               Thank you for choosing us for your health care visit with Yury Gardiner MD.  We are glad to serve you and happy to provide you with this summary of y Today's Vital Signs     BP Pulse Temp Height Weight BMI    104/64 mmHg 109 98.7 °F (37.1 °C) 5' 4\" (1.626 m) 191 lb (86.637 kg) 32.77 kg/m2         Current Medications          This list is accurate as of: 3/17/17  9:18 AM.  Always use your most recent me Commonly known as:  PLAQUENIL           HydrOXYzine HCl 10 MG Tabs   TAKE 1 TABLET BY MOUTH THREE TIMES DAILY AS NEEDED FOR ITCHING   Commonly known as:  ATARAX           lisinopril 40 MG Tabs   Take 1 tablet (40 mg total) by mouth once daily.    Commonly k

## (undated) NOTE — LETTER
58/6/6233              Fred Landeros        [facility] [address]       To Whom It May Concern:         My patient Fred Landeros is off of work starting Nov . 26 , 2020  .    She will remain off of work and will be re evaluate

## (undated) NOTE — LETTER
92/01/86        Raiza Olvera  [address] [address]      Dear Angela Britt,    [facility] records indicate that you have outstanding lab work and or testing that was ordered for you and has not yet been completed:     CBC WITH DIFFERENTIAL WITH RASHEEDA
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
## (undated) NOTE — LETTER
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